# Patient Record
Sex: FEMALE | Race: WHITE | Employment: UNEMPLOYED | URBAN - METROPOLITAN AREA
[De-identification: names, ages, dates, MRNs, and addresses within clinical notes are randomized per-mention and may not be internally consistent; named-entity substitution may affect disease eponyms.]

---

## 2017-02-02 ENCOUNTER — OFFICE VISIT (OUTPATIENT)
Dept: INTERNAL MEDICINE CLINIC | Facility: CLINIC | Age: 52
End: 2017-02-02

## 2017-02-02 VITALS
BODY MASS INDEX: 27.32 KG/M2 | WEIGHT: 154.19 LBS | RESPIRATION RATE: 16 BRPM | HEART RATE: 84 BPM | DIASTOLIC BLOOD PRESSURE: 77 MMHG | SYSTOLIC BLOOD PRESSURE: 117 MMHG | HEIGHT: 63 IN

## 2017-02-02 DIAGNOSIS — R73.03 PREDIABETES: ICD-10-CM

## 2017-02-02 DIAGNOSIS — Z00.00 ROUTINE HEALTH MAINTENANCE: Primary | ICD-10-CM

## 2017-02-02 DIAGNOSIS — F41.9 ANXIETY: ICD-10-CM

## 2017-02-02 DIAGNOSIS — K21.9 GASTROESOPHAGEAL REFLUX DISEASE, ESOPHAGITIS PRESENCE NOT SPECIFIED: ICD-10-CM

## 2017-02-02 PROCEDURE — 99213 OFFICE O/P EST LOW 20 MIN: CPT | Performed by: INTERNAL MEDICINE

## 2017-02-02 PROCEDURE — 99396 PREV VISIT EST AGE 40-64: CPT | Performed by: INTERNAL MEDICINE

## 2017-02-02 RX ORDER — RANITIDINE 150 MG/1
150 CAPSULE ORAL 2 TIMES DAILY
Qty: 60 CAPSULE | Refills: 11 | Status: SHIPPED | OUTPATIENT
Start: 2017-02-02 | End: 2017-03-14

## 2017-02-02 RX ORDER — METFORMIN HYDROCHLORIDE 500 MG/1
TABLET, EXTENDED RELEASE ORAL
Qty: 90 TABLET | Refills: 1 | Status: SHIPPED | OUTPATIENT
Start: 2017-02-02 | End: 2017-08-10

## 2017-02-02 RX ORDER — PANTOPRAZOLE SODIUM 40 MG/1
40 TABLET, DELAYED RELEASE ORAL
Qty: 30 TABLET | Refills: 5 | Status: SHIPPED | OUTPATIENT
Start: 2017-02-02 | End: 2017-03-14

## 2017-02-02 RX ORDER — METFORMIN HYDROCHLORIDE 500 MG/1
TABLET, EXTENDED RELEASE ORAL
Qty: 270 TABLET | Refills: 1 | Status: SHIPPED | OUTPATIENT
Start: 2017-02-02 | End: 2017-03-14

## 2017-02-02 NOTE — PATIENT INSTRUCTIONS
Alternate pantoprazole 40 mg once a day with Ranitidine 150 mg twice a day. Non-fasting labs in April.

## 2017-02-02 NOTE — PROGRESS NOTES
HPI:    Patient ID: Marlen Acevedo is a 45year old female. HPI Comments: Pt is here for a physical and pap. Pantoprazole is cheaper than omeprazole.   Gastro-esophageal Reflux  She reports no abdominal pain, no chest pain, no coughing, no nausea, Known Allergies   PHYSICAL EXAM:   Physical Exam   Nursing note and vitals reviewed. Constitutional: She is oriented to person, place, and time. She appears well-developed and well-nourished. HENT:   Head: Normocephalic and atraumatic.    Right Ear: Tym management. - Hemoglobin A1C [E]; Future    3. Gastroesophageal reflux disease, esophagitis presence not specified  Will try weaning to ranitidine  - Pantoprazole Sodium 40 MG Oral Tab EC;  Take 1 tablet (40 mg total) by mouth every morning before breakfas

## 2017-02-04 LAB — HPV I/H RISK 1 DNA SPEC QL NAA+PROBE: NEGATIVE

## 2017-03-11 DIAGNOSIS — R73.03 PREDIABETES: ICD-10-CM

## 2017-03-11 DIAGNOSIS — K21.9 GASTROESOPHAGEAL REFLUX DISEASE, ESOPHAGITIS PRESENCE NOT SPECIFIED: ICD-10-CM

## 2017-03-11 DIAGNOSIS — F41.9 ANXIETY: ICD-10-CM

## 2017-03-14 ENCOUNTER — TELEPHONE (OUTPATIENT)
Dept: INTERNAL MEDICINE CLINIC | Facility: CLINIC | Age: 52
End: 2017-03-14

## 2017-03-14 DIAGNOSIS — K21.9 GASTROESOPHAGEAL REFLUX DISEASE, ESOPHAGITIS PRESENCE NOT SPECIFIED: Primary | ICD-10-CM

## 2017-03-14 RX ORDER — PANTOPRAZOLE SODIUM 40 MG/1
40 TABLET, DELAYED RELEASE ORAL
Qty: 90 TABLET | Refills: 1 | Status: SHIPPED | OUTPATIENT
Start: 2017-03-14 | End: 2017-03-25

## 2017-03-14 RX ORDER — RANITIDINE 150 MG/1
150 TABLET ORAL 2 TIMES DAILY
Qty: 180 TABLET | Refills: 1 | Status: SHIPPED | OUTPATIENT
Start: 2017-03-14 | End: 2017-03-17

## 2017-03-14 RX ORDER — METFORMIN HYDROCHLORIDE 500 MG/1
TABLET, EXTENDED RELEASE ORAL
Qty: 90 TABLET | Refills: 1 | Status: CANCELLED | OUTPATIENT
Start: 2017-03-14

## 2017-03-14 RX ORDER — METFORMIN HYDROCHLORIDE 500 MG/1
TABLET, EXTENDED RELEASE ORAL
Qty: 270 TABLET | Refills: 1 | Status: SHIPPED | OUTPATIENT
Start: 2017-03-14 | End: 2017-03-25

## 2017-03-14 RX ORDER — RANITIDINE 150 MG/1
150 CAPSULE ORAL 2 TIMES DAILY
Qty: 180 CAPSULE | Refills: 1 | Status: SHIPPED | OUTPATIENT
Start: 2017-03-14 | End: 2017-03-25

## 2017-03-14 NOTE — TELEPHONE ENCOUNTER
Diabetes Medications  Protocol Criteria:  · Appointment scheduled in the past 6 months or the next 3 months  · A1C < 7.5 in the past 6 months  · Creatinine in the past 12 months  · Creatinine result < 1.5   Recent Visits       Provider Department Primary D Future Appointments       Provider Department Appt Notes    In 4 months MD Christine Zimmer Visit   ordered follow up visit

## 2017-03-14 NOTE — TELEPHONE ENCOUNTER
[facility] pts [facility] time using this pharmacy. Current Outpatient Prescriptions:  Sertraline HCl 50 MG Oral Tab Take 1 tablet (50 mg total) by mouth daily. Take one half tab daily for 1 week, then one daily.  Disp:

## 2017-03-15 NOTE — TELEPHONE ENCOUNTER
Pharmacy left .  stts capsules are on back order, requesting to change to tablets.     RaNITidine HCl 150 MG Oral Cap

## 2017-03-15 NOTE — TELEPHONE ENCOUNTER
Sent to incorrect pharmacy.  Should be sent to [facility] instead of [place] [id number]

## 2017-03-17 RX ORDER — RANITIDINE 150 MG/1
150 TABLET ORAL 2 TIMES DAILY
Qty: 180 TABLET | Refills: 1 | Status: SHIPPED | OUTPATIENT
Start: 2017-03-17 | End: 2017-08-03

## 2017-03-21 DIAGNOSIS — K21.9 GASTROESOPHAGEAL REFLUX DISEASE, ESOPHAGITIS PRESENCE NOT SPECIFIED: ICD-10-CM

## 2017-03-21 DIAGNOSIS — R73.03 PREDIABETES: Primary | ICD-10-CM

## 2017-03-21 DIAGNOSIS — F41.9 ANXIETY: ICD-10-CM

## 2017-03-21 NOTE — TELEPHONE ENCOUNTER
Pt would like a refill on her metformin, pantoprazole, ranitidine, sertraline medication. Pt would like these to go to the [facility] [employer] Mail order at Phone: [phone].        Current Outpatient Prescriptions:  RaNITidine HCl 150 MG Oral Tab Take 1 tablet

## 2017-03-27 RX ORDER — PANTOPRAZOLE SODIUM 40 MG/1
40 TABLET, DELAYED RELEASE ORAL
Qty: 90 TABLET | Refills: 1 | Status: SHIPPED | OUTPATIENT
Start: 2017-03-27 | End: 2017-11-08

## 2017-03-27 RX ORDER — RANITIDINE 150 MG/1
150 CAPSULE ORAL 2 TIMES DAILY
Qty: 180 CAPSULE | Refills: 1 | Status: SHIPPED | OUTPATIENT
Start: 2017-03-27 | End: 2017-09-13

## 2017-03-27 RX ORDER — METFORMIN HYDROCHLORIDE 500 MG/1
TABLET, EXTENDED RELEASE ORAL
Qty: 270 TABLET | Refills: 1 | Status: SHIPPED | OUTPATIENT
Start: 2017-03-27 | End: 2017-08-03

## 2017-05-02 ENCOUNTER — APPOINTMENT (OUTPATIENT)
Dept: LAB | Facility: HOSPITAL | Age: 52
End: 2017-05-02
Attending: INTERNAL MEDICINE
Payer: COMMERCIAL

## 2017-05-02 DIAGNOSIS — R73.03 PREDIABETES: ICD-10-CM

## 2017-05-02 PROCEDURE — 36415 COLL VENOUS BLD VENIPUNCTURE: CPT

## 2017-05-02 PROCEDURE — 83036 HEMOGLOBIN GLYCOSYLATED A1C: CPT

## 2017-07-07 ENCOUNTER — TELEPHONE (OUTPATIENT)
Dept: GASTROENTEROLOGY | Facility: CLINIC | Age: 52
End: 2017-07-07

## 2017-08-03 ENCOUNTER — TELEPHONE (OUTPATIENT)
Dept: GASTROENTEROLOGY | Facility: CLINIC | Age: 52
End: 2017-08-03

## 2017-08-03 RX ORDER — DOXEPIN HYDROCHLORIDE 50 MG/1
1 CAPSULE ORAL DAILY
COMMUNITY

## 2017-08-08 ENCOUNTER — LAB ENCOUNTER (OUTPATIENT)
Dept: LAB | Facility: HOSPITAL | Age: 52
End: 2017-08-08
Attending: INTERNAL MEDICINE
Payer: COMMERCIAL

## 2017-08-08 DIAGNOSIS — R53.83 FATIGUE, UNSPECIFIED TYPE: ICD-10-CM

## 2017-08-08 LAB
BASOPHILS # BLD: 0 K/UL (ref 0–0.2)
BASOPHILS NFR BLD: 0 %
EOSINOPHIL # BLD: 0.2 K/UL (ref 0–0.7)
EOSINOPHIL NFR BLD: 3 %
ERYTHROCYTE [DISTWIDTH] IN BLOOD BY AUTOMATED COUNT: 13.1 % (ref 11–15)
HBA1C MFR BLD: 6.5 % (ref 4–6)
HCT VFR BLD AUTO: 36.8 % (ref 35–48)
HGB BLD-MCNC: 12 G/DL (ref 12–16)
LYMPHOCYTES # BLD: 3 K/UL (ref 1–4)
LYMPHOCYTES NFR BLD: 37 %
MCH RBC QN AUTO: 29.1 PG (ref 27–32)
MCHC RBC AUTO-ENTMCNC: 32.6 G/DL (ref 32–37)
MCV RBC AUTO: 89.3 FL (ref 80–100)
MONOCYTES # BLD: 0.6 K/UL (ref 0–1)
MONOCYTES NFR BLD: 7 %
NEUTROPHILS # BLD AUTO: 4.2 K/UL (ref 1.8–7.7)
NEUTROPHILS NFR BLD: 53 %
PLATELET # BLD AUTO: 231 K/UL (ref 140–400)
PMV BLD AUTO: 9.6 FL (ref 7.4–10.3)
RBC # BLD AUTO: 4.12 M/UL (ref 3.7–5.4)
TSH SERPL-ACNC: 5.19 UIU/ML (ref 0.45–5.33)
VIT B12 SERPL-MCNC: 437 PG/ML (ref 181–914)
WBC # BLD AUTO: 8 K/UL (ref 4–11)

## 2017-08-08 PROCEDURE — 84443 ASSAY THYROID STIM HORMONE: CPT

## 2017-08-08 PROCEDURE — 83036 HEMOGLOBIN GLYCOSYLATED A1C: CPT

## 2017-08-08 PROCEDURE — 36415 COLL VENOUS BLD VENIPUNCTURE: CPT

## 2017-08-08 PROCEDURE — 82607 VITAMIN B-12: CPT

## 2017-08-08 PROCEDURE — 85025 COMPLETE CBC W/AUTO DIFF WBC: CPT

## 2017-08-10 ENCOUNTER — OFFICE VISIT (OUTPATIENT)
Dept: INTERNAL MEDICINE CLINIC | Facility: CLINIC | Age: 52
End: 2017-08-10

## 2017-08-10 ENCOUNTER — TELEPHONE (OUTPATIENT)
Dept: INTERNAL MEDICINE CLINIC | Facility: CLINIC | Age: 52
End: 2017-08-10

## 2017-08-10 VITALS
DIASTOLIC BLOOD PRESSURE: 85 MMHG | WEIGHT: 179.19 LBS | RESPIRATION RATE: 18 BRPM | BODY MASS INDEX: 31.75 KG/M2 | HEIGHT: 63 IN | SYSTOLIC BLOOD PRESSURE: 135 MMHG | HEART RATE: 80 BPM

## 2017-08-10 DIAGNOSIS — R73.03 PREDIABETES: ICD-10-CM

## 2017-08-10 DIAGNOSIS — M54.2 NECK PAIN: ICD-10-CM

## 2017-08-10 DIAGNOSIS — E66.9 OBESITY (BMI 30-39.9): Primary | ICD-10-CM

## 2017-08-10 PROCEDURE — 99212 OFFICE O/P EST SF 10 MIN: CPT | Performed by: INTERNAL MEDICINE

## 2017-08-10 PROCEDURE — 99214 OFFICE O/P EST MOD 30 MIN: CPT | Performed by: INTERNAL MEDICINE

## 2017-08-10 RX ORDER — METFORMIN HYDROCHLORIDE 500 MG/1
TABLET, EXTENDED RELEASE ORAL
Qty: 90 TABLET | Refills: 1 | Status: SHIPPED | OUTPATIENT
Start: 2017-08-10 | End: 2017-08-10

## 2017-08-10 RX ORDER — METFORMIN HYDROCHLORIDE 500 MG/1
TABLET, EXTENDED RELEASE ORAL
Qty: 360 TABLET | Refills: 1 | Status: SHIPPED | OUTPATIENT
Start: 2017-08-10 | End: 2018-02-14

## 2017-08-10 NOTE — TELEPHONE ENCOUNTER
Pt requesting to have Metformin 500 mg four tablets daily sent to mail order pharmacy, noted prescription sent today with [employer]. Med pended for review.

## 2017-08-10 NOTE — PATIENT INSTRUCTIONS
Shoulder Shrug Exercise  To start, sit in a chair with your feet flat on the floor. Shift your weight slightly forward to avoid rounding your back. Relax.  Keep your ears, shoulders, and hips aligned:  · Raise both of your shoulders as high as you can, as

## 2017-08-10 NOTE — TELEPHONE ENCOUNTER
METFORMIN 500 MG ---- DISPENSED TODAY - TAKE 4 TIMES DAILY   NOT FULL SCRIPT ( SHE IS NOT GETTING THAT ONE AT [facility] )     PLEASE DENT TO MAIL ORDER FOR FULL 90 DAY SUPPLY  # [phone]      [facility] [address], [address] [facility]

## 2017-08-10 NOTE — PROGRESS NOTES
HPI:    Patient ID: Delon Collet is a 45year old female. She gained 28 pounds since she went off Qsymia. She has an increase in her appetite. She is eating healthy. She has pain in the back of her neck and lower head.   She has a lot of stress a Allergies:No Known Allergies   PHYSICAL EXAM:   Physical Exam   Nursing note and vitals reviewed. Constitutional: She is oriented to person, place, and time and obese. She appears well-developed. HENT:   Head: Normocephalic and atraumatic.    Eyes:

## 2017-08-11 ENCOUNTER — TELEPHONE (OUTPATIENT)
Dept: GASTROENTEROLOGY | Facility: CLINIC | Age: 52
End: 2017-08-11

## 2017-08-11 NOTE — TELEPHONE ENCOUNTER
Dr. Brandon High--    This patient called back after scheduling her colonoscopy and requested to add an EGD since she is having a burning sensation in the back of her throat and is not sure if this has to do with reflux she was diagnosis or not but this do

## 2017-08-14 NOTE — TELEPHONE ENCOUNTER
I called this patient back and L/M to inform this patient that the EGD has been added to her procedure. There is no need for her to return my call, it was just an FYI since we did discuss all prep and any question she had at the time of scheduling.     I no

## 2017-09-13 ENCOUNTER — PATIENT MESSAGE (OUTPATIENT)
Dept: INTERNAL MEDICINE CLINIC | Facility: CLINIC | Age: 52
End: 2017-09-13

## 2017-09-13 DIAGNOSIS — E66.9 OBESITY (BMI 30-39.9): ICD-10-CM

## 2017-09-13 DIAGNOSIS — K21.9 GASTROESOPHAGEAL REFLUX DISEASE, ESOPHAGITIS PRESENCE NOT SPECIFIED: ICD-10-CM

## 2017-09-13 DIAGNOSIS — F41.9 ANXIETY: ICD-10-CM

## 2017-09-13 NOTE — TELEPHONE ENCOUNTER
Has appt. .Chart reviewed. Refills sent per Triage Dept protocol. Refill Protocol Appointment Criteria  · Appointment scheduled in the past 6 months or in the next 3 months  Recent Outpatient Visits            1 month ago Obesity (BMI 30-39. 9)    Maryjane

## 2017-09-13 NOTE — TELEPHONE ENCOUNTER
From: Sanjuana Gutiérrez  Sent: 9/13/2017 11:06 AM CDT  Subject: Medication Renewal Request    Fanny L. Babetta Paget would like a refill of the following medications:     Sertraline HCl 50 MG Oral Tab Milton Greenfield MD]   Patient Comment: Pls phone in 90-day

## 2017-09-13 NOTE — TELEPHONE ENCOUNTER
From: Sonal Fisher  To: Fredy Cruz MD  Sent: 9/13/2017 11:15 AM CDT  Subject: Prescription Question    Hello - fyi, I just submitted two Rx refill requests . .. one to refill my Sertraline which I ran out of and would like to request a refill.  The

## 2017-09-15 RX ORDER — RANITIDINE 150 MG/1
CAPSULE ORAL
Qty: 180 CAPSULE | Refills: 0 | Status: SHIPPED | OUTPATIENT
Start: 2017-09-15 | End: 2017-11-08

## 2017-09-15 NOTE — TELEPHONE ENCOUNTER
Duplicate therapy warning, please advise that you want pt on rantidine and pantoprazole  Refill Protocol Appointment Criteria  · Appointment scheduled in the past 12 months or in the next 3 months  Recent Outpatient Visits            1 month ago Obesity (B

## 2017-09-19 ENCOUNTER — TELEPHONE (OUTPATIENT)
Dept: OTHER | Age: 52
End: 2017-09-19

## 2017-09-19 NOTE — TELEPHONE ENCOUNTER
Heidi   Patient Medication Renewal Request Pool 6 days ago         Annabelle.  Tadeo Petey would like a refill of the following medications:         Phentermine-Topiramate (QSYMIA) 7.5-46 MG Oral Capsule SR 24 Hr Katia Eddy MD]       Patient Comment: R

## 2017-09-20 ENCOUNTER — PATIENT MESSAGE (OUTPATIENT)
Dept: OTHER | Age: 52
End: 2017-09-20

## 2017-09-20 NOTE — TELEPHONE ENCOUNTER
From: Glenys Villegas LPN  To: Ita Persaud  Sent: 9/20/2017 9:33 AM CDT  Subject: Medication approval    Marlys Preciado 7.5-46 mg capsule has been approved by your insurance company effective 8/21/2017-12/19/2017.  You may now have the

## 2017-09-20 NOTE — TELEPHONE ENCOUNTER
PA for Qsymia 7.5-46 mg cap completed with [facility] via [address] [facility] [address]. Medication approved effective 8/21/2017-12/19/2017. Patient notified via [address]

## 2017-10-19 DIAGNOSIS — E66.9 OBESITY (BMI 30-39.9): ICD-10-CM

## 2017-10-20 NOTE — TELEPHONE ENCOUNTER
Please advise on refill request.     Recent Outpatient Visits            2 months ago Obesity (BMI 30-39. 9)    Ayesha Mcmahon MD    Office Visit    8 months ago Routine health maintenance    [address], [address],

## 2017-10-20 NOTE — TELEPHONE ENCOUNTER
Jose Suero was called into the [facility] pharmacy in [place] as ordered on 10/20/17

## 2017-10-20 NOTE — TELEPHONE ENCOUNTER
From: Fazal Ricketts  Sent: 10/19/2017 7:53 PM CDT  Subject: Medication Renewal Request    Paula LHiram Tanrolan would like a refill of the following medications:     Phentermine-Topiramate (QSYMIA) 7.5-46 MG Oral Capsule SR 24 Hr Yessi Amezquita MD]   Dnih Muñoz

## 2017-10-31 ENCOUNTER — HOSPITAL ENCOUNTER (OUTPATIENT)
Facility: HOSPITAL | Age: 52
Setting detail: HOSPITAL OUTPATIENT SURGERY
Discharge: HOME | End: 2017-10-31
Attending: INTERNAL MEDICINE | Admitting: INTERNAL MEDICINE
Payer: COMMERCIAL

## 2017-10-31 ENCOUNTER — SURGERY (OUTPATIENT)
Age: 52
End: 2017-10-31

## 2017-10-31 DIAGNOSIS — Z86.010 HX OF COLONIC POLYPS: ICD-10-CM

## 2017-10-31 DIAGNOSIS — Z12.11 SPECIAL SCREENING FOR MALIGNANT NEOPLASM OF COLON: ICD-10-CM

## 2017-10-31 DIAGNOSIS — K21.9 ACID REFLUX DISEASE: ICD-10-CM

## 2017-10-31 PROCEDURE — 45385 COLONOSCOPY W/LESION REMOVAL: CPT | Performed by: INTERNAL MEDICINE

## 2017-10-31 PROCEDURE — 0DBL8ZX EXCISION OF TRANSVERSE COLON, VIA NATURAL OR ARTIFICIAL OPENING ENDOSCOPIC, DIAGNOSTIC: ICD-10-PCS | Performed by: INTERNAL MEDICINE

## 2017-10-31 PROCEDURE — 0DB68ZX EXCISION OF STOMACH, VIA NATURAL OR ARTIFICIAL OPENING ENDOSCOPIC, DIAGNOSTIC: ICD-10-PCS | Performed by: INTERNAL MEDICINE

## 2017-10-31 PROCEDURE — 0DB48ZX EXCISION OF ESOPHAGOGASTRIC JUNCTION, VIA NATURAL OR ARTIFICIAL OPENING ENDOSCOPIC, DIAGNOSTIC: ICD-10-PCS | Performed by: INTERNAL MEDICINE

## 2017-10-31 PROCEDURE — 99153 MOD SED SAME PHYS/QHP EA: CPT | Performed by: INTERNAL MEDICINE

## 2017-10-31 PROCEDURE — 43239 EGD BIOPSY SINGLE/MULTIPLE: CPT | Performed by: INTERNAL MEDICINE

## 2017-10-31 PROCEDURE — 99152 MOD SED SAME PHYS/QHP 5/>YRS: CPT | Performed by: INTERNAL MEDICINE

## 2017-10-31 RX ORDER — SODIUM CHLORIDE 0.9 % (FLUSH) 0.9 %
10 SYRINGE (ML) INJECTION AS NEEDED
Status: DISCONTINUED | OUTPATIENT
Start: 2017-10-31 | End: 2017-10-31

## 2017-10-31 RX ORDER — MIDAZOLAM HYDROCHLORIDE 1 MG/ML
INJECTION INTRAMUSCULAR; INTRAVENOUS
Status: DISCONTINUED | OUTPATIENT
Start: 2017-10-31 | End: 2017-10-31

## 2017-10-31 RX ORDER — SODIUM CHLORIDE, SODIUM LACTATE, POTASSIUM CHLORIDE, CALCIUM CHLORIDE 600; 310; 30; 20 MG/100ML; MG/100ML; MG/100ML; MG/100ML
INJECTION, SOLUTION INTRAVENOUS CONTINUOUS
Status: DISCONTINUED | OUTPATIENT
Start: 2017-10-31 | End: 2017-10-31

## 2017-10-31 RX ORDER — MIDAZOLAM HYDROCHLORIDE 1 MG/ML
1 INJECTION INTRAMUSCULAR; INTRAVENOUS EVERY 5 MIN PRN
Status: DISCONTINUED | OUTPATIENT
Start: 2017-10-31 | End: 2017-10-31

## 2017-10-31 NOTE — OPERATIVE REPORT
[facility] Endoscopy Report      Date of Procedure:  10/31/17      Preoperative Diagnosis:  1. Personal history of adenomatous colon polyps  2. Colorectal cancer screening  3. Refractory pyrosis      Postoperative Diagnosis:  1.   Roman Garcia The endoscope was withdrawn to the stomach where retroflexion of the angulus, body, cardia and fundus was performed. The instrument was straightened, insufflated air and fluid were suctioned and the endoscope was withdrawn.   The procedures were well agusto Hiatal hernia  6. Rule out short segment Carrasco's esophagus      Recommendations:  1. Follow-up biopsy results. 2.  Probable follow-up colonoscopy in 5 years. 3.  High-fiber diet. 4.  Further recommendations pending biopsy and clinical course.

## 2017-10-31 NOTE — H&P
History & Physical Examination    Patient Name: Lottie Roman  MRN: [id number]  [facility]: [id number]  YOB: 1965    Diagnosis: History of adenomatous colon polyps, colorectal cancer screening and refractory pyrosis        Prescriptions Prior to Comment: (2drinks) weekly       SYSTEM Check if Review is Normal Check if Physical Exam is Normal If not normal, please explain:   HEENT Alcaeus.Flynn ] [ Nevin Edwards  Alcaeus.Pruett ] [ X]    HEART Alcaeus.Pruett ] [ Derinda Catching Alcaeus.Flynn ] [ Kathi Fire Alcaeus.Flynn ] [ Nona Sin Alcaeus.Flynn ] [ Oneal Carbo

## 2017-11-01 VITALS
WEIGHT: 170 LBS | RESPIRATION RATE: 13 BRPM | OXYGEN SATURATION: 96 % | SYSTOLIC BLOOD PRESSURE: 114 MMHG | HEART RATE: 67 BPM | DIASTOLIC BLOOD PRESSURE: 70 MMHG | HEIGHT: 63 IN | BODY MASS INDEX: 30.12 KG/M2

## 2017-11-03 ENCOUNTER — TELEPHONE (OUTPATIENT)
Dept: OTHER | Age: 52
End: 2017-11-03

## 2017-11-03 NOTE — TELEPHONE ENCOUNTER
Pt calling (Name and  verified) and states that she has blood work due to be drawn and is asking if she needs to be fasting? Pt informed that the only pending lab order is for TSH and that does not require fasting.     Pt verbalized understanding and

## 2017-11-04 ENCOUNTER — APPOINTMENT (OUTPATIENT)
Dept: LAB | Facility: HOSPITAL | Age: 52
End: 2017-11-04
Attending: INTERNAL MEDICINE
Payer: COMMERCIAL

## 2017-11-04 DIAGNOSIS — R63.5 WEIGHT GAIN: ICD-10-CM

## 2017-11-04 PROCEDURE — 84443 ASSAY THYROID STIM HORMONE: CPT

## 2017-11-04 PROCEDURE — 36415 COLL VENOUS BLD VENIPUNCTURE: CPT

## 2017-11-06 ENCOUNTER — OFFICE VISIT (OUTPATIENT)
Dept: INTERNAL MEDICINE CLINIC | Facility: CLINIC | Age: 52
End: 2017-11-06

## 2017-11-06 VITALS
SYSTOLIC BLOOD PRESSURE: 103 MMHG | HEIGHT: 63 IN | BODY MASS INDEX: 31.01 KG/M2 | HEART RATE: 84 BPM | WEIGHT: 175 LBS | RESPIRATION RATE: 16 BRPM | DIASTOLIC BLOOD PRESSURE: 67 MMHG

## 2017-11-06 DIAGNOSIS — Z00.00 ROUTINE HEALTH MAINTENANCE: ICD-10-CM

## 2017-11-06 DIAGNOSIS — Z12.31 VISIT FOR SCREENING MAMMOGRAM: ICD-10-CM

## 2017-11-06 DIAGNOSIS — E66.9 OBESITY (BMI 30-39.9): ICD-10-CM

## 2017-11-06 DIAGNOSIS — M54.2 NECK PAIN: ICD-10-CM

## 2017-11-06 DIAGNOSIS — N76.0 ACUTE VAGINITIS: ICD-10-CM

## 2017-11-06 DIAGNOSIS — R51.9 OCCIPITAL PAIN: Primary | ICD-10-CM

## 2017-11-06 DIAGNOSIS — R92.2 DENSE BREAST: ICD-10-CM

## 2017-11-06 DIAGNOSIS — R39.9 URINARY SYMPTOM OR SIGN: ICD-10-CM

## 2017-11-06 DIAGNOSIS — R73.03 PREDIABETES: ICD-10-CM

## 2017-11-06 PROBLEM — R92.30 DENSE BREAST: Status: ACTIVE | Noted: 2017-11-06

## 2017-11-06 PROCEDURE — 81002 URINALYSIS NONAUTO W/O SCOPE: CPT | Performed by: INTERNAL MEDICINE

## 2017-11-06 PROCEDURE — 99214 OFFICE O/P EST MOD 30 MIN: CPT | Performed by: INTERNAL MEDICINE

## 2017-11-06 RX ORDER — FLUCONAZOLE 150 MG/1
150 TABLET ORAL ONCE
Qty: 1 TABLET | Refills: 0 | Status: SHIPPED | OUTPATIENT
Start: 2017-11-06 | End: 2017-11-06

## 2017-11-06 NOTE — ASSESSMENT & PLAN NOTE
Pt feels well on on Qsymia without side effects. She has noticed a decreased appetite and has lost weight with diet and exercise. She understands possible CV risks. Pt requests to continue Qsymia.

## 2017-11-06 NOTE — PATIENT INSTRUCTIONS
Do fasting labs soon. Fast for 12 hours. Water is okay. Walk in. Use over-the-counter 3 day Monistat, gyne-Lotrimin, or Femstat. Generic is fine.

## 2017-11-06 NOTE — ASSESSMENT & PLAN NOTE
Urine dip is positive only for leuks and pt describes more of a vaginitis type of irritation. Will Rx for vaginitis.

## 2017-11-06 NOTE — PROGRESS NOTES
When she urinates it burns a little. She also urinates frequently. This has been going on for 2 weeks. Bly is very painful. She gets pain in the back of her head and it is getting worse. It is constant.   She is afraid to take anything because and in no distress. No distress. HENT:   Head: Normocephalic and atraumatic. Right Ear: External ear normal.   Left Ear: External ear normal.   Eyes: Conjunctivae are normal. Pupils are equal, round, and reactive to light. No scleral icterus.    Neck: N LOLIS (CPT=77067/09754)    Routine health maintenance        Relevant Orders    HEMOGLOBIN A1C    LIPID PANEL    COMP METABOLIC PANEL (14)

## 2017-11-08 DIAGNOSIS — K21.9 GASTROESOPHAGEAL REFLUX DISEASE, ESOPHAGITIS PRESENCE NOT SPECIFIED: ICD-10-CM

## 2017-11-08 RX ORDER — RANITIDINE 150 MG/1
150 CAPSULE ORAL NIGHTLY
Qty: 90 CAPSULE | Refills: 3 | Status: SHIPPED | OUTPATIENT
Start: 2017-11-08 | End: 2018-10-16

## 2017-11-08 RX ORDER — PANTOPRAZOLE SODIUM 40 MG/1
40 TABLET, DELAYED RELEASE ORAL
Qty: 180 TABLET | Refills: 3 | Status: SHIPPED | OUTPATIENT
Start: 2017-11-08 | End: 2018-04-16

## 2017-11-09 ENCOUNTER — TELEPHONE (OUTPATIENT)
Dept: GASTROENTEROLOGY | Facility: CLINIC | Age: 52
End: 2017-11-09

## 2017-11-09 NOTE — TELEPHONE ENCOUNTER
----- Message from Sam Herman MD sent at 11/8/2017  8:42 PM CST -----  I spoke to Neo. She had a solitary small tubular adenoma removed. She has had adenomatous polyps in the past.  I have discussed the significance.   Uncomplicated diverticulo

## 2017-12-03 ENCOUNTER — APPOINTMENT (OUTPATIENT)
Dept: LAB | Facility: HOSPITAL | Age: 52
End: 2017-12-03
Attending: INTERNAL MEDICINE
Payer: COMMERCIAL

## 2017-12-03 DIAGNOSIS — Z00.00 ROUTINE HEALTH MAINTENANCE: ICD-10-CM

## 2017-12-03 PROCEDURE — 80053 COMPREHEN METABOLIC PANEL: CPT

## 2017-12-03 PROCEDURE — 83036 HEMOGLOBIN GLYCOSYLATED A1C: CPT

## 2017-12-03 PROCEDURE — 80061 LIPID PANEL: CPT

## 2017-12-03 PROCEDURE — 36415 COLL VENOUS BLD VENIPUNCTURE: CPT

## 2017-12-30 ENCOUNTER — HOSPITAL ENCOUNTER (OUTPATIENT)
Dept: MAMMOGRAPHY | Facility: HOSPITAL | Age: 52
Discharge: HOME | End: 2017-12-30
Attending: INTERNAL MEDICINE
Payer: COMMERCIAL

## 2017-12-30 DIAGNOSIS — Z12.31 VISIT FOR SCREENING MAMMOGRAM: ICD-10-CM

## 2017-12-30 DIAGNOSIS — R92.2 DENSE BREAST: ICD-10-CM

## 2017-12-30 PROCEDURE — 77063 BREAST TOMOSYNTHESIS BI: CPT | Performed by: INTERNAL MEDICINE

## 2017-12-30 PROCEDURE — 77067 SCR MAMMO BI INCL CAD: CPT | Performed by: INTERNAL MEDICINE

## 2018-01-03 ENCOUNTER — TELEPHONE (OUTPATIENT)
Dept: INTERNAL MEDICINE CLINIC | Facility: CLINIC | Age: 53
End: 2018-01-03

## 2018-01-03 DIAGNOSIS — E66.9 OBESITY (BMI 30-39.9): ICD-10-CM

## 2018-01-06 NOTE — TELEPHONE ENCOUNTER
Please advise   Last refilled on 11/6/17  #30  1 refill.     Refill Protocol Appointment Criteria  · Appointment scheduled in the past 6 months or in the next 3 months  Recent Outpatient Visits            2 months ago Occipital pain    [facility]

## 2018-01-06 NOTE — TELEPHONE ENCOUNTER
From: Jaspreet Saunders  Sent: 1/3/2018 3:28 PM CST  Subject: Medication Renewal Request    Paula Juarez would like a refill of the following medications:     Phentermine-Topiramate (QSYMIA) 7.5-46 MG Oral Capsule SR 24 Hr Peri Almaguer MD]   Karin

## 2018-01-08 NOTE — TELEPHONE ENCOUNTER
Pharmacy calling stating     Phentermine-Topiramate (QSYMIA) 7.5-46 MG Oral Capsule SR 24 Hr Take 1 capsule by mouth once daily.  Disp: 30 capsule Rfl: 1      Needs prior authorization

## 2018-01-08 NOTE — TELEPHONE ENCOUNTER
Pharmacy calling stating      Phentermine-Topiramate (QSYMIA) 7.5-46 MG Oral Capsule SR 24 Hr Take 1 capsule by mouth once daily.  Disp: 30 capsule Rfl: 1       Needs prior authorization      Please call [phone] to request authorization

## 2018-01-08 NOTE — TELEPHONE ENCOUNTER
PA for Qsymia 7.5-46 mg cap  completed with [facility] via CMM response time 24-72 hours KEY [id number].

## 2018-01-12 ENCOUNTER — PATIENT MESSAGE (OUTPATIENT)
Dept: INTERNAL MEDICINE CLINIC | Facility: CLINIC | Age: 53
End: 2018-01-12

## 2018-01-13 NOTE — TELEPHONE ENCOUNTER
Re router to Dr Sharmila Jones,     Do you want PA for this? From: Torrie Other  To: Hakan Alan MD  Sent: 1/12/2018  4:06 PM CST  Subject: Other    Dear Dr. Sharmila Jones, thank you for calling [facility] to authorize my Qsymia refill.   According to [employer], they ar

## 2018-01-15 ENCOUNTER — OFFICE VISIT (OUTPATIENT)
Dept: INTERNAL MEDICINE CLINIC | Facility: CLINIC | Age: 53
End: 2018-01-15

## 2018-01-15 VITALS
HEIGHT: 63 IN | DIASTOLIC BLOOD PRESSURE: 71 MMHG | TEMPERATURE: 99 F | BODY MASS INDEX: 30.83 KG/M2 | WEIGHT: 174 LBS | SYSTOLIC BLOOD PRESSURE: 121 MMHG | HEART RATE: 89 BPM | RESPIRATION RATE: 16 BRPM

## 2018-01-15 DIAGNOSIS — R73.03 PREDIABETES: ICD-10-CM

## 2018-01-15 DIAGNOSIS — E66.9 OBESITY (BMI 30-39.9): ICD-10-CM

## 2018-01-15 DIAGNOSIS — M54.2 NECK PAIN: Primary | ICD-10-CM

## 2018-01-15 DIAGNOSIS — M25.512 ACUTE PAIN OF LEFT SHOULDER: ICD-10-CM

## 2018-01-15 PROCEDURE — 99214 OFFICE O/P EST MOD 30 MIN: CPT | Performed by: INTERNAL MEDICINE

## 2018-01-15 PROCEDURE — 99212 OFFICE O/P EST SF 10 MIN: CPT | Performed by: INTERNAL MEDICINE

## 2018-01-15 NOTE — ASSESSMENT & PLAN NOTE
This is somewhat better with reduced stress at work, however she still has the pain.   Will refer for PT.

## 2018-01-15 NOTE — PROGRESS NOTES
HPI:    Patient ID: Kiley Suarez is a 45year old female. She hasn't been on the Qsymia because her insurance didn't authorize it, even though it had said it will. She just got a letter saying they will authorize it.   She has not been exercising m capsule Rfl: 1   Pantoprazole Sodium 40 MG Oral Tab EC Take 1 tablet (40 mg total) by mouth 2 (two) times daily before meals. Disp: 180 tablet Rfl: 3   RaNITidine HCl 150 MG Oral Cap Take 1 capsule (150 mg total) by mouth nightly.  Disp: 90 capsule Rfl: 3 Primary     This is somewhat better with reduced stress at work, however she still has the pain. Will refer for PT. Relevant Orders    PHYSICAL THERAPY - INTERNAL    Obesity (BMI 30-39. 9)     She wants to resume the Qsymia.      Pt felt well on on

## 2018-01-15 NOTE — TELEPHONE ENCOUNTER
No prior auth needed. Pt received notification from her insurance that the PA was extended for another year.

## 2018-01-15 NOTE — ASSESSMENT & PLAN NOTE
She wants to resume the Qsymia. Pt felt well on on Qsymia without side effects. She has noticed a decreased appetite and had lost weight with diet and exercise. She understands possible CV risks. Qsymia refilled.

## 2018-01-16 ENCOUNTER — PATIENT MESSAGE (OUTPATIENT)
Dept: OTHER | Age: 53
End: 2018-01-16

## 2018-02-14 DIAGNOSIS — R73.03 PREDIABETES: ICD-10-CM

## 2018-02-14 RX ORDER — METFORMIN HYDROCHLORIDE 500 MG/1
TABLET, EXTENDED RELEASE ORAL
Qty: 360 TABLET | Refills: 1 | Status: SHIPPED | OUTPATIENT
Start: 2018-02-14 | End: 2018-07-26

## 2018-02-14 NOTE — TELEPHONE ENCOUNTER
Refilled per protocol.     Diabetes Medications  Protocol Criteria:  · Appointment scheduled in the past 6 months or the next 3 months  · A1C < 7.5 in the past 6 months  · Creatinine in the past 12 months  · Creatinine result < 1.5   Recent Outpatient Visit

## 2018-02-17 ENCOUNTER — OFFICE VISIT (OUTPATIENT)
Dept: INTERNAL MEDICINE CLINIC | Facility: CLINIC | Age: 53
End: 2018-02-17

## 2018-02-17 VITALS
HEIGHT: 63 IN | BODY MASS INDEX: 30.48 KG/M2 | SYSTOLIC BLOOD PRESSURE: 117 MMHG | HEART RATE: 76 BPM | WEIGHT: 172 LBS | TEMPERATURE: 98 F | DIASTOLIC BLOOD PRESSURE: 83 MMHG

## 2018-02-17 DIAGNOSIS — F41.9 ANXIETY: ICD-10-CM

## 2018-02-17 DIAGNOSIS — M54.2 NECK PAIN: Primary | ICD-10-CM

## 2018-02-17 DIAGNOSIS — E66.9 OBESITY (BMI 30-39.9): ICD-10-CM

## 2018-02-17 PROCEDURE — 99212 OFFICE O/P EST SF 10 MIN: CPT | Performed by: INTERNAL MEDICINE

## 2018-02-17 PROCEDURE — 99214 OFFICE O/P EST MOD 30 MIN: CPT | Performed by: INTERNAL MEDICINE

## 2018-02-17 NOTE — ASSESSMENT & PLAN NOTE
Pt has another month of Qsymia which she will finish, then she will start Contrave. S/E of Contrave discussed with pt. F/u 2 months.

## 2018-02-17 NOTE — PROGRESS NOTES
HPI:    Patient ID: Marika Perez is a 52year old female. The pain at the back of her head went away. She didn't go to therapy yet.    She fell in November up the elevator and hit her face against the wall of the elevator going to the train at the Phentermine-Topiramate (QSYMIA) 7.5-46 MG Oral Capsule SR 24 Hr Take 1 capsule by mouth once daily. Disp: 30 capsule Rfl: 1   Pantoprazole Sodium 40 MG Oral Tab EC Take 1 tablet (40 mg total) by mouth 2 (two) times daily before meals.  (Patient taking dif 30-39. 9)     Pt has another month of Qsymia which she will finish, then she will start Contrave. S/E of Contrave discussed with pt. F/u 2 months.          Relevant Medications    Naltrexone-Bupropion HCl ER (CONTRAVE) 8-90 MG Oral Tablet 12 Hr    Anxiety

## 2018-02-22 DIAGNOSIS — F41.9 ANXIETY: ICD-10-CM

## 2018-02-23 ENCOUNTER — TELEPHONE (OUTPATIENT)
Dept: INTERNAL MEDICINE CLINIC | Facility: CLINIC | Age: 53
End: 2018-02-23

## 2018-02-23 NOTE — TELEPHONE ENCOUNTER
Pharmacy calling because on 02/17/2018     Naltrexone-Bupropion HCl ER (CONTRAVE) 8-90 MG Oral Tablet 12 Hr Week 1: 1 tablet in AM      None in PMWeek 2: 1 tablet in AM      1 tablet in PMWeek 3: 2 tablets in AM    1 tablet in PMWeek 4: [place] 2 tablets in

## 2018-02-28 NOTE — TELEPHONE ENCOUNTER
PA for Contrave 8-90 mg ER tab completed with [facility] via [id number]. Medication approved effective 1/28/2018-6/27/2018; patient notified via [address]

## 2018-03-29 ENCOUNTER — MED REC SCAN ONLY (OUTPATIENT)
Dept: INTERNAL MEDICINE CLINIC | Facility: CLINIC | Age: 53
End: 2018-03-29

## 2018-03-30 DIAGNOSIS — E66.9 OBESITY (BMI 30-39.9): ICD-10-CM

## 2018-04-16 ENCOUNTER — OFFICE VISIT (OUTPATIENT)
Dept: INTERNAL MEDICINE CLINIC | Facility: CLINIC | Age: 53
End: 2018-04-16

## 2018-04-16 VITALS
HEIGHT: 63 IN | DIASTOLIC BLOOD PRESSURE: 87 MMHG | SYSTOLIC BLOOD PRESSURE: 137 MMHG | HEART RATE: 76 BPM | WEIGHT: 176 LBS | BODY MASS INDEX: 31.18 KG/M2

## 2018-04-16 DIAGNOSIS — E66.9 OBESITY (BMI 30-39.9): ICD-10-CM

## 2018-04-16 DIAGNOSIS — K21.9 GASTROESOPHAGEAL REFLUX DISEASE, ESOPHAGITIS PRESENCE NOT SPECIFIED: ICD-10-CM

## 2018-04-16 DIAGNOSIS — M54.2 NECK PAIN: Primary | ICD-10-CM

## 2018-04-16 DIAGNOSIS — R73.03 PREDIABETES: ICD-10-CM

## 2018-04-16 PROBLEM — N76.0 ACUTE VAGINITIS: Status: RESOLVED | Noted: 2017-11-06 | Resolved: 2018-04-16

## 2018-04-16 PROBLEM — R39.9 URINARY SYMPTOM OR SIGN: Status: RESOLVED | Noted: 2017-11-06 | Resolved: 2018-04-16

## 2018-04-16 PROCEDURE — 99212 OFFICE O/P EST SF 10 MIN: CPT | Performed by: INTERNAL MEDICINE

## 2018-04-16 PROCEDURE — 99214 OFFICE O/P EST MOD 30 MIN: CPT | Performed by: INTERNAL MEDICINE

## 2018-04-16 RX ORDER — DEXAMETHASONE 1 MG
TABLET ORAL
Qty: 1 TABLET | Refills: 0 | Status: SHIPPED | OUTPATIENT
Start: 2018-04-16 | End: 2018-05-31 | Stop reason: ALTCHOICE

## 2018-04-16 RX ORDER — PANTOPRAZOLE SODIUM 40 MG/1
40 TABLET, DELAYED RELEASE ORAL EVERY MORNING
Qty: 90 TABLET | Refills: 1 | Status: SHIPPED | OUTPATIENT
Start: 2018-04-16 | End: 2018-10-16

## 2018-04-17 NOTE — ASSESSMENT & PLAN NOTE
Patient has noticed the development of the supraclavicular fat pad. She also has multiple other symptoms that are consistent with Cushing syndrome but could be also due to obesity.   I will check a dexamethasone suppression test.  Discussed this with rosmery

## 2018-04-17 NOTE — PROGRESS NOTES
HPI:    Patient ID: Kiley Suarez is a 52year old female. The Contrave is not helping. She gained weight. She feels drowsy on the Contrave. She is going for PT for her neck and it is helping a little, but she still has pain.       Obesity   This Rfl: 0   dexamethasone 1 MG Oral Tab 1 tab at 11 pm.  Do cortisol blood test at 8 am. Disp: 1 tablet Rfl: 0   Sertraline HCl 50 MG Oral Tab Take 1 tablet (50 mg total) by mouth daily.  Disp: 90 tablet Rfl: 1   METFORMIN HCL  MG Oral Tablet 24 Hr TAKE EC    Obesity (BMI 30-39. 9)     Patient has noticed the development of the supraclavicular fat pad. She also has multiple other symptoms that are consistent with Cushing syndrome but could be also due to obesity.   I will check a dexamethasone suppression

## 2018-04-21 ENCOUNTER — APPOINTMENT (OUTPATIENT)
Dept: LAB | Age: 53
End: 2018-04-21
Attending: INTERNAL MEDICINE
Payer: COMMERCIAL

## 2018-04-21 DIAGNOSIS — E66.9 OBESITY (BMI 30-39.9): ICD-10-CM

## 2018-04-21 PROCEDURE — 82533 TOTAL CORTISOL: CPT

## 2018-04-21 PROCEDURE — 36415 COLL VENOUS BLD VENIPUNCTURE: CPT

## 2018-05-06 DIAGNOSIS — E66.9 OBESITY (BMI 30-39.9): ICD-10-CM

## 2018-05-06 RX ORDER — NALTREXONE HYDROCHLORIDE AND BUPROPION HYDROCHLORIDE 8; 90 MG/1; MG/1
2 TABLET, EXTENDED RELEASE ORAL 2 TIMES DAILY
Qty: 120 TABLET | Refills: 0 | OUTPATIENT
Start: 2018-05-06

## 2018-05-24 ENCOUNTER — TELEPHONE (OUTPATIENT)
Dept: INTERNAL MEDICINE CLINIC | Facility: CLINIC | Age: 53
End: 2018-05-24

## 2018-05-24 NOTE — TELEPHONE ENCOUNTER
Called pt and she is going out of town starting tomorrow and be back next wk Tuesday and would like to know if she can see Dr Kathy Arguelles either Wednesday or Thursday. Pls advise, Thank you.

## 2018-05-24 NOTE — TELEPHONE ENCOUNTER
Eula Winn From [place] [id number] wants to inform  pt isn't getting better.  Was getting better but has reach the pt she's not anymore    Pt is also going to contact the Dr to come back in

## 2018-05-31 ENCOUNTER — OFFICE VISIT (OUTPATIENT)
Dept: INTERNAL MEDICINE CLINIC | Facility: CLINIC | Age: 53
End: 2018-05-31

## 2018-05-31 VITALS
HEIGHT: 63 IN | BODY MASS INDEX: 29.21 KG/M2 | SYSTOLIC BLOOD PRESSURE: 112 MMHG | WEIGHT: 164.88 LBS | DIASTOLIC BLOOD PRESSURE: 75 MMHG | HEART RATE: 74 BPM

## 2018-05-31 DIAGNOSIS — M54.2 NECK PAIN: Primary | ICD-10-CM

## 2018-05-31 PROCEDURE — 99213 OFFICE O/P EST LOW 20 MIN: CPT | Performed by: INTERNAL MEDICINE

## 2018-05-31 PROCEDURE — 99212 OFFICE O/P EST SF 10 MIN: CPT | Performed by: INTERNAL MEDICINE

## 2018-05-31 NOTE — PROGRESS NOTES
HPI:    Patient ID: Gemma Moran is a 52year old female. Pt lost 12 pounds with Qsymia. The physical therapy helped some but then it stopped helping. She seems to have plateaued. She cannot turn her head to look back.   She has a tingling in her daily. Disp:  Rfl:    RaNITidine HCl 150 MG Oral Cap Take 1 capsule (150 mg total) by mouth nightly.  Disp: 90 capsule Rfl: 3       Allergies:No Known Allergies     Smoking status: Never Smoker                                                              Sm

## 2018-06-01 NOTE — ASSESSMENT & PLAN NOTE
Pt has had some improvement with PT, but she has persistent pain and tingling of her neck and shoulder. Will refer to ortho for further eval and treatment.

## 2018-06-26 ENCOUNTER — APPOINTMENT (OUTPATIENT)
Dept: LAB | Facility: HOSPITAL | Age: 53
End: 2018-06-26
Attending: INTERNAL MEDICINE
Payer: COMMERCIAL

## 2018-06-26 DIAGNOSIS — R73.03 PREDIABETES: ICD-10-CM

## 2018-06-26 PROCEDURE — 83036 HEMOGLOBIN GLYCOSYLATED A1C: CPT

## 2018-06-26 PROCEDURE — 36415 COLL VENOUS BLD VENIPUNCTURE: CPT

## 2018-07-01 ENCOUNTER — TELEPHONE (OUTPATIENT)
Dept: INTERNAL MEDICINE CLINIC | Facility: CLINIC | Age: 53
End: 2018-07-01

## 2018-07-01 DIAGNOSIS — E11.9 CONTROLLED TYPE 2 DIABETES MELLITUS WITHOUT COMPLICATION, WITHOUT LONG-TERM CURRENT USE OF INSULIN (HCC): Primary | ICD-10-CM

## 2018-07-01 NOTE — TELEPHONE ENCOUNTER
Spoke to pt:  Her A1C went up to 6.7. Pt has new onset diabetes. I will refer to the dietician and diabetic instructor. She saw Dr. Dee Buitrago about her neck and will have an MRI of her neck.

## 2018-07-26 DIAGNOSIS — R73.03 PREDIABETES: ICD-10-CM

## 2018-07-26 RX ORDER — METFORMIN HYDROCHLORIDE 500 MG/1
TABLET, EXTENDED RELEASE ORAL
Qty: 360 TABLET | Refills: 0 | Status: SHIPPED | OUTPATIENT
Start: 2018-07-26 | End: 2018-10-16

## 2018-07-26 NOTE — TELEPHONE ENCOUNTER
Diabetes Medication Protocol Passed7/26 3:33 AM   Creatinine in the past 12 months    Last A1C < 7.5 and within past 6 months    Last serum creatinine result < 1.5    Appointment in past 6 or next 3 months     Medication refilled for 90 days per protocol.

## 2018-07-29 ENCOUNTER — LAB ENCOUNTER (OUTPATIENT)
Dept: LAB | Facility: HOSPITAL | Age: 53
End: 2018-07-29
Attending: PAIN MEDICINE
Payer: COMMERCIAL

## 2018-07-29 DIAGNOSIS — R20.0 FACIAL NUMBNESS: Primary | ICD-10-CM

## 2018-07-29 LAB
BUN SERPL-MCNC: 15 MG/DL (ref 8–20)
CREAT SERPL-MCNC: 1 MG/DL (ref 0.5–1.5)

## 2018-07-29 PROCEDURE — 84520 ASSAY OF UREA NITROGEN: CPT

## 2018-07-29 PROCEDURE — 36415 COLL VENOUS BLD VENIPUNCTURE: CPT

## 2018-07-29 PROCEDURE — 82565 ASSAY OF CREATININE: CPT

## 2018-08-08 ENCOUNTER — OFFICE VISIT (OUTPATIENT)
Dept: INTERNAL MEDICINE CLINIC | Facility: CLINIC | Age: 53
End: 2018-08-08
Payer: COMMERCIAL

## 2018-08-08 ENCOUNTER — MED REC SCAN ONLY (OUTPATIENT)
Dept: INTERNAL MEDICINE CLINIC | Facility: CLINIC | Age: 53
End: 2018-08-08

## 2018-08-08 VITALS
BODY MASS INDEX: 30.21 KG/M2 | TEMPERATURE: 98 F | HEART RATE: 76 BPM | RESPIRATION RATE: 18 BRPM | SYSTOLIC BLOOD PRESSURE: 114 MMHG | HEIGHT: 63 IN | DIASTOLIC BLOOD PRESSURE: 77 MMHG | WEIGHT: 170.5 LBS

## 2018-08-08 DIAGNOSIS — Z00.00 ROUTINE HEALTH MAINTENANCE: Primary | ICD-10-CM

## 2018-08-08 DIAGNOSIS — R39.9 URINARY SYMPTOM OR SIGN: ICD-10-CM

## 2018-08-08 DIAGNOSIS — E11.9 CONTROLLED TYPE 2 DIABETES MELLITUS WITHOUT COMPLICATION, WITHOUT LONG-TERM CURRENT USE OF INSULIN (HCC): ICD-10-CM

## 2018-08-08 DIAGNOSIS — G47.10 HYPERSOMNIA: ICD-10-CM

## 2018-08-08 PROBLEM — R40.0 DAYTIME SOMNOLENCE: Status: ACTIVE | Noted: 2018-08-08

## 2018-08-08 PROBLEM — R92.2 DENSE BREAST: Status: RESOLVED | Noted: 2017-11-06 | Resolved: 2018-08-08

## 2018-08-08 PROBLEM — R92.30 DENSE BREAST: Status: RESOLVED | Noted: 2017-11-06 | Resolved: 2018-08-08

## 2018-08-08 PROBLEM — R51.9 OCCIPITAL PAIN: Status: RESOLVED | Noted: 2017-11-06 | Resolved: 2018-08-08

## 2018-08-08 PROBLEM — M25.512 ACUTE PAIN OF LEFT SHOULDER: Status: RESOLVED | Noted: 2018-01-15 | Resolved: 2018-08-08

## 2018-08-08 LAB
APPEARANCE: CLEAR
BILIRUBIN: NEGATIVE
GLUCOSE (URINE DIPSTICK): NEGATIVE MG/DL
KETONES (URINE DIPSTICK): NEGATIVE MG/DL
MULTISTIX LOT#: ABNORMAL NUMERIC
NITRITE, URINE: NEGATIVE
OCCULT BLOOD: NEGATIVE
PH, URINE: 5 (ref 4.5–8)
PROTEIN (URINE DIPSTICK): NEGATIVE MG/DL
SPECIFIC GRAVITY: 1 (ref 1–1.03)
URINE-COLOR: YELLOW
UROBILINOGEN,SEMI-QN: 0.2 MG/DL (ref 0–1.9)

## 2018-08-08 PROCEDURE — 81002 URINALYSIS NONAUTO W/O SCOPE: CPT | Performed by: INTERNAL MEDICINE

## 2018-08-08 PROCEDURE — 99214 OFFICE O/P EST MOD 30 MIN: CPT | Performed by: INTERNAL MEDICINE

## 2018-08-08 PROCEDURE — 99212 OFFICE O/P EST SF 10 MIN: CPT | Performed by: INTERNAL MEDICINE

## 2018-08-08 PROCEDURE — 99396 PREV VISIT EST AGE 40-64: CPT | Performed by: INTERNAL MEDICINE

## 2018-08-08 RX ORDER — GLIMEPIRIDE 2 MG/1
2 TABLET ORAL
Qty: 30 TABLET | Refills: 3 | Status: SHIPPED | OUTPATIENT
Start: 2018-08-08 | End: 2018-09-24

## 2018-08-08 RX ORDER — NITROFURANTOIN 25; 75 MG/1; MG/1
100 CAPSULE ORAL 2 TIMES DAILY
Qty: 14 CAPSULE | Refills: 0 | Status: SHIPPED | OUTPATIENT
Start: 2018-08-08 | End: 2018-08-15

## 2018-08-08 NOTE — PROGRESS NOTES
HPI:    Patient ID: Westley Green is a 52year old female. Pt is here for a physical.    Pt went for an injection in her neck which helped some. She has a little mild residual pain.   The orthopedist ordered an MRI of the brain which she had done to shortness of breath and wheezing. Cardiovascular: Negative for chest pain and palpitations. Gastrointestinal: Positive for diarrhea. Negative for nausea, vomiting, abdominal pain and blood in stool.    Endocrine: Negative for cold intolerance and heat SR 24 Hr Take 1 capsule by mouth once daily.  Disp: 90 capsule Rfl: 0       Allergies:No Known Allergies     Smoking status: Never Smoker                                                              Smokeless tobacco: Never Used                      Alcohol time. No cranial nerve deficit. Gait normal.   Skin: Skin is warm and dry. No rash noted. Psychiatric: She has a normal mood and affect.  Thought content normal.              ASSESSMENT/PLAN:     Problem List Items Addressed This Visit        High    Rout Prescriptions Disp Refills    glimepiride 2 MG Oral Tab 30 tablet 3      Sig: Take 1 tablet (2 mg total) by mouth daily with breakfast.      Nitrofurantoin Monohyd Macro (MACROBID) 100 MG Oral Cap 14 capsule 0      Sig: Take 1 capsule (100 mg total) by nhung

## 2018-08-09 NOTE — ASSESSMENT & PLAN NOTE
The patient has new onset diabetes. Her A1c was 6.7, however she cannot tolerate the metformin due to diarrhea. We will decrease it from 2000 mg daily to 1000 mg daily and add glimepiride. I urged her to schedule an appointment with the dietitian.

## 2018-08-09 NOTE — ASSESSMENT & PLAN NOTE
Patient has been having urinary symptoms. Her urine dipstick is abnormal, so I will treat empirically for urinary tract infection and send the urine for culture.

## 2018-08-09 NOTE — ASSESSMENT & PLAN NOTE
Pt c/o severe daytime drowsiness and has an Epson Sleep score of 13. I discussed with the patient that she may have sleep apnea and that it increases the risk of stroke.   I recommended that she do a home sleep study and discussed that if it is  Positive,

## 2018-08-12 ENCOUNTER — LAB ENCOUNTER (OUTPATIENT)
Dept: LAB | Facility: HOSPITAL | Age: 53
End: 2018-08-12
Attending: INTERNAL MEDICINE
Payer: COMMERCIAL

## 2018-08-12 DIAGNOSIS — Z00.00 ROUTINE HEALTH MAINTENANCE: ICD-10-CM

## 2018-08-12 LAB
ALBUMIN SERPL BCP-MCNC: 4.2 G/DL (ref 3.5–4.8)
ALBUMIN/GLOB SERPL: 1.3 {RATIO} (ref 1–2)
ALP SERPL-CCNC: 96 U/L (ref 32–100)
ALT SERPL-CCNC: 21 U/L (ref 14–54)
ANION GAP SERPL CALC-SCNC: 7 MMOL/L (ref 0–18)
AST SERPL-CCNC: 19 U/L (ref 15–41)
BASOPHILS # BLD: 0 K/UL (ref 0–0.2)
BASOPHILS NFR BLD: 0 %
BILIRUB SERPL-MCNC: 0.7 MG/DL (ref 0.3–1.2)
BUN SERPL-MCNC: 9 MG/DL (ref 8–20)
BUN/CREAT SERPL: 9.3 (ref 10–20)
CALCIUM SERPL-MCNC: 9.8 MG/DL (ref 8.5–10.5)
CHLORIDE SERPL-SCNC: 105 MMOL/L (ref 95–110)
CHOLEST SERPL-MCNC: 208 MG/DL (ref 110–200)
CO2 SERPL-SCNC: 30 MMOL/L (ref 22–32)
CREAT SERPL-MCNC: 0.97 MG/DL (ref 0.5–1.5)
EOSINOPHIL # BLD: 0.1 K/UL (ref 0–0.7)
EOSINOPHIL NFR BLD: 2 %
ERYTHROCYTE [DISTWIDTH] IN BLOOD BY AUTOMATED COUNT: 13.6 % (ref 11–15)
GLOBULIN PLAS-MCNC: 3.2 G/DL (ref 2.5–3.7)
GLUCOSE SERPL-MCNC: 94 MG/DL (ref 70–99)
HCT VFR BLD AUTO: 40.7 % (ref 35–48)
HDLC SERPL-MCNC: 62 MG/DL
HGB BLD-MCNC: 13 G/DL (ref 12–16)
LDLC SERPL CALC-MCNC: 126 MG/DL (ref 0–99)
LYMPHOCYTES # BLD: 3.3 K/UL (ref 1–4)
LYMPHOCYTES NFR BLD: 39 %
MCH RBC QN AUTO: 28.7 PG (ref 27–32)
MCHC RBC AUTO-ENTMCNC: 32 G/DL (ref 32–37)
MCV RBC AUTO: 89.7 FL (ref 80–100)
MONOCYTES # BLD: 0.6 K/UL (ref 0–1)
MONOCYTES NFR BLD: 7 %
NEUTROPHILS # BLD AUTO: 4.4 K/UL (ref 1.8–7.7)
NEUTROPHILS NFR BLD: 52 %
NONHDLC SERPL-MCNC: 146 MG/DL
OSMOLALITY UR CALC.SUM OF ELEC: 292 MOSM/KG (ref 275–295)
PATIENT FASTING: YES
PLATELET # BLD AUTO: 248 K/UL (ref 140–400)
PMV BLD AUTO: 9 FL (ref 7.4–10.3)
POTASSIUM SERPL-SCNC: 5.4 MMOL/L (ref 3.3–5.1)
PROT SERPL-MCNC: 7.4 G/DL (ref 5.9–8.4)
RBC # BLD AUTO: 4.54 M/UL (ref 3.7–5.4)
SODIUM SERPL-SCNC: 142 MMOL/L (ref 136–144)
TRIGL SERPL-MCNC: 99 MG/DL (ref 1–149)
TSH SERPL-ACNC: 2.61 UIU/ML (ref 0.45–5.33)
WBC # BLD AUTO: 8.4 K/UL (ref 4–11)

## 2018-08-12 PROCEDURE — 85025 COMPLETE CBC W/AUTO DIFF WBC: CPT

## 2018-08-12 PROCEDURE — 80053 COMPREHEN METABOLIC PANEL: CPT

## 2018-08-12 PROCEDURE — 36415 COLL VENOUS BLD VENIPUNCTURE: CPT

## 2018-08-12 PROCEDURE — 83036 HEMOGLOBIN GLYCOSYLATED A1C: CPT

## 2018-08-12 PROCEDURE — 84443 ASSAY THYROID STIM HORMONE: CPT

## 2018-08-12 PROCEDURE — 80061 LIPID PANEL: CPT

## 2018-08-13 LAB — HBA1C MFR BLD: 6.6 % (ref 4–6)

## 2018-09-24 DIAGNOSIS — E11.9 CONTROLLED TYPE 2 DIABETES MELLITUS WITHOUT COMPLICATION, WITHOUT LONG-TERM CURRENT USE OF INSULIN (HCC): ICD-10-CM

## 2018-09-24 RX ORDER — GLIMEPIRIDE 2 MG/1
2 TABLET ORAL
Qty: 30 TABLET | Refills: 2 | Status: SHIPPED | OUTPATIENT
Start: 2018-09-24 | End: 2018-09-28

## 2018-09-25 ENCOUNTER — OFFICE VISIT (OUTPATIENT)
Dept: SLEEP CENTER | Age: 53
End: 2018-09-25
Attending: INTERNAL MEDICINE
Payer: COMMERCIAL

## 2018-09-25 DIAGNOSIS — G47.10 HYPERSOMNIA: ICD-10-CM

## 2018-09-25 PROCEDURE — 95806 SLEEP STUDY UNATT&RESP EFFT: CPT

## 2018-09-25 NOTE — TELEPHONE ENCOUNTER
Diabetes Medications  Protocol Criteria:  · Appointment scheduled in the past 6 months or the next 3 months  · A1C < 7.5 in the past 6 months  · Creatinine in the past 12 months  · Creatinine result < 1.5   Recent Outpatient Visits            1 month ago R

## 2018-09-28 ENCOUNTER — PATIENT MESSAGE (OUTPATIENT)
Dept: INTERNAL MEDICINE CLINIC | Facility: CLINIC | Age: 53
End: 2018-09-28

## 2018-09-28 DIAGNOSIS — E11.9 CONTROLLED TYPE 2 DIABETES MELLITUS WITHOUT COMPLICATION, WITHOUT LONG-TERM CURRENT USE OF INSULIN (HCC): ICD-10-CM

## 2018-09-28 NOTE — TELEPHONE ENCOUNTER
Message sent to the patient for pharmacy clarification. Please see also refill request 9/28/18. Once patient responded regarding the pharmacy,please close this encounter as there is already refill request pended.     From: Rocio Hernandez  To: Elda Canales

## 2018-09-28 NOTE — TELEPHONE ENCOUNTER
Please see [employer] message 9/28/18 before refilling this medication,(pharmacy needs to update) still waiting for patient's response regarding pharmacy as this nurse cannot find the pharmacy in our data list when putting the phone numbers that she provided.

## 2018-09-29 RX ORDER — GLIMEPIRIDE 2 MG/1
2 TABLET ORAL
Qty: 90 TABLET | Refills: 0 | Status: SHIPPED | OUTPATIENT
Start: 2018-09-29 | End: 2018-10-16

## 2018-09-29 NOTE — PROCEDURES
[facility]  Accredited by the [facility] ([facility])    PATIENT'S NAME: Omar Shelton PHYSICIAN: Mireya Yanez MD   REFERRING PHYSICIAN: Mireya Yanez MD   PATIENT ACCOUNT #: [de-identified] LOCATION: breathing but has very significant clinical syndrome with pathological daytime sleepiness. 2.   Weight loss. 3.   Avoid alcohol. 4.   Avoid sedating drug. 5.   The patient should not drive if at all sleepy.     Please do not hesitate to contact me if

## 2018-10-01 ENCOUNTER — TELEPHONE (OUTPATIENT)
Dept: INTERNAL MEDICINE CLINIC | Facility: CLINIC | Age: 53
End: 2018-10-01

## 2018-10-01 NOTE — TELEPHONE ENCOUNTER
Per MAR 90 day supply sent 9/29/18. Spoke with [facility] who state they do have the order escribed 9/29/18 and it is in process.

## 2018-10-01 NOTE — TELEPHONE ENCOUNTER
GLIMEPIRIDE 2MG      PER PHARMACY:  ALTERNATIVE REQUESTED : NEED NEW RX PLEASE FOR 90 DAYS AS INS WILL COVER IF RX IS FOR 90      [facility]   [address]   [phone]

## 2018-10-16 ENCOUNTER — OFFICE VISIT (OUTPATIENT)
Dept: INTERNAL MEDICINE CLINIC | Facility: CLINIC | Age: 53
End: 2018-10-16
Payer: COMMERCIAL

## 2018-10-16 VITALS
HEART RATE: 70 BPM | BODY MASS INDEX: 33.08 KG/M2 | WEIGHT: 186.69 LBS | DIASTOLIC BLOOD PRESSURE: 75 MMHG | HEIGHT: 63 IN | SYSTOLIC BLOOD PRESSURE: 113 MMHG

## 2018-10-16 DIAGNOSIS — R40.0 DAYTIME SOMNOLENCE: ICD-10-CM

## 2018-10-16 DIAGNOSIS — E11.9 CONTROLLED TYPE 2 DIABETES MELLITUS WITHOUT COMPLICATION, WITHOUT LONG-TERM CURRENT USE OF INSULIN (HCC): Primary | ICD-10-CM

## 2018-10-16 DIAGNOSIS — K21.9 GASTROESOPHAGEAL REFLUX DISEASE, ESOPHAGITIS PRESENCE NOT SPECIFIED: ICD-10-CM

## 2018-10-16 PROBLEM — R39.9 URINARY SYMPTOM OR SIGN: Status: RESOLVED | Noted: 2017-11-06 | Resolved: 2018-10-16

## 2018-10-16 PROCEDURE — 99214 OFFICE O/P EST MOD 30 MIN: CPT | Performed by: INTERNAL MEDICINE

## 2018-10-16 PROCEDURE — 99212 OFFICE O/P EST SF 10 MIN: CPT | Performed by: INTERNAL MEDICINE

## 2018-10-16 RX ORDER — RANITIDINE 150 MG/1
150 CAPSULE ORAL NIGHTLY
Qty: 90 CAPSULE | Refills: 3 | Status: SHIPPED | OUTPATIENT
Start: 2018-10-16 | End: 2020-01-28 | Stop reason: ALTCHOICE

## 2018-10-16 RX ORDER — PANTOPRAZOLE SODIUM 40 MG/1
40 TABLET, DELAYED RELEASE ORAL EVERY MORNING
Qty: 90 TABLET | Refills: 1 | Status: SHIPPED | OUTPATIENT
Start: 2018-10-16 | End: 2019-04-10

## 2018-10-16 RX ORDER — METFORMIN HYDROCHLORIDE 500 MG/1
TABLET, EXTENDED RELEASE ORAL
Qty: 180 TABLET | Refills: 1 | Status: SHIPPED | OUTPATIENT
Start: 2018-10-16 | End: 2019-04-04

## 2018-10-16 NOTE — ASSESSMENT & PLAN NOTE
We decreased her metformin because of her diarrhea and added glimepiride, but she cannot tolerate the glimepiride due to side effects. I have advised her to stop the glimepiride but keep the metformin at the lower dose. We will check her A1c in 1 month.

## 2018-10-16 NOTE — PROGRESS NOTES
HPI:    Patient ID: Xavier Mosquera is a 52year old female. Pt did the sleep study and it showed very mild sleep apnea. Pt sleeps 6 hours at night. She doesn't have trouble falling asleep and she could sleep late if she didn't have to go to work. HISTORY      per NG: excision lession, local   • TONSILLECTOMY              Current Outpatient Medications:  MetFORMIN HCl  MG Oral Tablet 24 Hr TAKE 2 TABLETS DAILY WITH  BREAKFAST Disp: 180 tablet Rfl: 1   Pantoprazole Sodium 40 MG Oral Tab EC Take List Items Addressed This Visit        Medium    Controlled type 2 diabetes mellitus without complication, without long-term current use of insulin ([facility] [address] - Primary     We decreased her metformin because of her diarrhea and added glimepiride, but she cannot

## 2018-10-16 NOTE — PATIENT INSTRUCTIONS
Please call the [facility] at [phone], Option 2 to schedule your appointment. Non-fasting labs next month.

## 2018-10-16 NOTE — ASSESSMENT & PLAN NOTE
I reviewed the patient's sleep study results with her. She has only mild sleep apnea.   We discussed her sleep problem and it appears that she actually sleeps quite well but she only gets 6 hours of sleep at night because she goes to bed late and gets up e

## 2018-11-21 ENCOUNTER — APPOINTMENT (OUTPATIENT)
Dept: LAB | Facility: HOSPITAL | Age: 53
End: 2018-11-21
Attending: INTERNAL MEDICINE
Payer: COMMERCIAL

## 2018-11-21 DIAGNOSIS — E11.9 CONTROLLED TYPE 2 DIABETES MELLITUS WITHOUT COMPLICATION, WITHOUT LONG-TERM CURRENT USE OF INSULIN (HCC): ICD-10-CM

## 2018-11-21 PROCEDURE — 36415 COLL VENOUS BLD VENIPUNCTURE: CPT

## 2018-11-21 PROCEDURE — 83036 HEMOGLOBIN GLYCOSYLATED A1C: CPT

## 2018-12-05 ENCOUNTER — OFFICE VISIT (OUTPATIENT)
Dept: INTERNAL MEDICINE CLINIC | Facility: CLINIC | Age: 53
End: 2018-12-05
Payer: COMMERCIAL

## 2018-12-05 VITALS
SYSTOLIC BLOOD PRESSURE: 127 MMHG | HEIGHT: 63 IN | BODY MASS INDEX: 33.4 KG/M2 | WEIGHT: 188.5 LBS | HEART RATE: 81 BPM | DIASTOLIC BLOOD PRESSURE: 80 MMHG

## 2018-12-05 DIAGNOSIS — E66.9 OBESITY (BMI 30-39.9): ICD-10-CM

## 2018-12-05 DIAGNOSIS — Z23 NEED FOR VACCINATION: ICD-10-CM

## 2018-12-05 DIAGNOSIS — R05.9 COUGH: ICD-10-CM

## 2018-12-05 DIAGNOSIS — E11.9 CONTROLLED TYPE 2 DIABETES MELLITUS WITHOUT COMPLICATION, WITHOUT LONG-TERM CURRENT USE OF INSULIN (HCC): Primary | ICD-10-CM

## 2018-12-05 DIAGNOSIS — E78.00 HYPERCHOLESTEROLEMIA: ICD-10-CM

## 2018-12-05 PROCEDURE — 90471 IMMUNIZATION ADMIN: CPT | Performed by: INTERNAL MEDICINE

## 2018-12-05 PROCEDURE — 90732 PPSV23 VACC 2 YRS+ SUBQ/IM: CPT | Performed by: INTERNAL MEDICINE

## 2018-12-05 PROCEDURE — 99212 OFFICE O/P EST SF 10 MIN: CPT | Performed by: INTERNAL MEDICINE

## 2018-12-05 PROCEDURE — 99214 OFFICE O/P EST MOD 30 MIN: CPT | Performed by: INTERNAL MEDICINE

## 2018-12-05 RX ORDER — ATORVASTATIN CALCIUM 10 MG/1
10 TABLET, FILM COATED ORAL DAILY
Qty: 90 TABLET | Refills: 1 | Status: SHIPPED | OUTPATIENT
Start: 2018-12-05 | End: 2019-05-23

## 2018-12-05 NOTE — PROGRESS NOTES
HPI:    Patient ID: tIa Persaud is a 52year old female. Her dizzy spells went away when she resumed the sertraline. Her depression is about the same. Her job is being eliminated. She might take time off and exercise. She has not lost weight. MetFORMIN HCl  MG Oral Tablet 24 Hr TAKE 2 TABLETS DAILY WITH  BREAKFAST Disp: 180 tablet Rfl: 1   Pantoprazole Sodium 40 MG Oral Tab EC Take 1 tablet (40 mg total) by mouth every morning.  Disp: 90 tablet Rfl: 1   RaNITidine HCl 150 MG Oral [place] will continue with his current dose of metformin. Unprioritized    Obesity (BMI 30-39. 9)     Pt used Qsymia in the past and would like to resume it. She had noticed a decreased appetite and has lost weight with diet and exercise.   She [place]

## 2018-12-05 NOTE — ASSESSMENT & PLAN NOTE
Patient stop the glimepiride due to side effects. She decrease the metformin from 4 to 2 tablets daily due to diarrhea. Her symptoms are now tolerable. Her A1c was 6.9. We will continue with his current dose of metformin.

## 2018-12-05 NOTE — ASSESSMENT & PLAN NOTE
Pt used Qsymia in the past and would like to resume it. She had noticed a decreased appetite and has lost weight with diet and exercise. She understands possible CV risks. Will resume Qsymia.

## 2018-12-05 NOTE — ASSESSMENT & PLAN NOTE
Patient has an intermittent mild cough. She will try to notice if it is different on the days she takes the pantoprazole compared with the day she takes the ranitidine. It may be secondary to GERD. Follow-up in 3 months.

## 2018-12-05 NOTE — PATIENT INSTRUCTIONS
Please call the [facility] for Diabetes at [phone], Option 2 to schedule your appointment. Do fasting labs 1 week or so before your next appointment. Fast for 12 hours. Water and meds are okay. Walk in.

## 2018-12-05 NOTE — ASSESSMENT & PLAN NOTE
Counseled pt regarding the benefits of treating hypercholesterolemia with a statin in patients with diabetes. Plan no improvement in mortality. Discussed possible side effects. Patient agreed to a trial of a statin.   We will recheck fasting lipids and l

## 2018-12-19 ENCOUNTER — HOSPITAL ENCOUNTER (OUTPATIENT)
Dept: MAMMOGRAPHY | Facility: HOSPITAL | Age: 53
Discharge: HOME | End: 2018-12-19
Attending: INTERNAL MEDICINE
Payer: COMMERCIAL

## 2018-12-19 DIAGNOSIS — Z00.00 ROUTINE HEALTH MAINTENANCE: ICD-10-CM

## 2018-12-19 PROCEDURE — 77067 SCR MAMMO BI INCL CAD: CPT | Performed by: INTERNAL MEDICINE

## 2018-12-19 PROCEDURE — 77063 BREAST TOMOSYNTHESIS BI: CPT | Performed by: INTERNAL MEDICINE

## 2019-01-14 DIAGNOSIS — F41.9 ANXIETY: ICD-10-CM

## 2019-04-04 DIAGNOSIS — E11.9 CONTROLLED TYPE 2 DIABETES MELLITUS WITHOUT COMPLICATION, WITHOUT LONG-TERM CURRENT USE OF INSULIN (HCC): ICD-10-CM

## 2019-04-04 RX ORDER — METFORMIN HYDROCHLORIDE 500 MG/1
TABLET, EXTENDED RELEASE ORAL
Qty: 180 TABLET | Refills: 0 | Status: SHIPPED | OUTPATIENT
Start: 2019-04-04 | End: 2019-06-24

## 2019-04-05 NOTE — TELEPHONE ENCOUNTER
Refill passed per [facility], [facility] protocol.     Diabetes Medications  Protocol Criteria:  · Appointment scheduled in the past 6 months or the next 3 months  · A1C < 7.5 in the past 6 months  · Creatinine in the past 12 months  · Creatinine result < 1.5   Re

## 2019-04-06 DIAGNOSIS — F41.9 ANXIETY: ICD-10-CM

## 2019-04-06 NOTE — TELEPHONE ENCOUNTER
[facility] refill request for:    •  Sertraline HCl 50 MG Oral Tab, Take 1 tablet (50 mg total) by mouth daily. , Disp: 90 tablet, Rfl: 0

## 2019-04-07 NOTE — TELEPHONE ENCOUNTER
Refilled per protocol. Requested Prescriptions   Pending Prescriptions Disp Refills   • Sertraline HCl 50 MG Oral Tab 90 tablet 0     Sig: Take 1 tablet (50 mg total) by mouth daily.        Psychiatric Non-Scheduled (Anti-Anxiety) Passed - 4/6/2019  1:18 P

## 2019-04-10 ENCOUNTER — OFFICE VISIT (OUTPATIENT)
Dept: INTERNAL MEDICINE CLINIC | Facility: CLINIC | Age: 54
End: 2019-04-10
Payer: COMMERCIAL

## 2019-04-10 VITALS
DIASTOLIC BLOOD PRESSURE: 77 MMHG | HEIGHT: 63 IN | BODY MASS INDEX: 33.22 KG/M2 | HEART RATE: 84 BPM | WEIGHT: 187.5 LBS | SYSTOLIC BLOOD PRESSURE: 115 MMHG

## 2019-04-10 DIAGNOSIS — F41.9 ANXIETY AND DEPRESSION: ICD-10-CM

## 2019-04-10 DIAGNOSIS — K21.9 GASTROESOPHAGEAL REFLUX DISEASE, ESOPHAGITIS PRESENCE NOT SPECIFIED: ICD-10-CM

## 2019-04-10 DIAGNOSIS — E11.9 CONTROLLED TYPE 2 DIABETES MELLITUS WITHOUT COMPLICATION, WITHOUT LONG-TERM CURRENT USE OF INSULIN (HCC): Primary | ICD-10-CM

## 2019-04-10 DIAGNOSIS — F32.A ANXIETY AND DEPRESSION: ICD-10-CM

## 2019-04-10 DIAGNOSIS — E66.9 OBESITY (BMI 30-39.9): ICD-10-CM

## 2019-04-10 PROCEDURE — 99212 OFFICE O/P EST SF 10 MIN: CPT | Performed by: INTERNAL MEDICINE

## 2019-04-10 PROCEDURE — 99214 OFFICE O/P EST MOD 30 MIN: CPT | Performed by: INTERNAL MEDICINE

## 2019-04-10 RX ORDER — PANTOPRAZOLE SODIUM 40 MG/1
40 TABLET, DELAYED RELEASE ORAL EVERY MORNING
Qty: 90 TABLET | Refills: 1 | Status: SHIPPED | OUTPATIENT
Start: 2019-04-10 | End: 2020-07-09

## 2019-04-10 NOTE — PROGRESS NOTES
HPI:    Patient ID: Gemma Moran is a 62year old female. Pt had a forced MCC and this is the first week! She will take a few months off. She tried the Qsymia for 2 months and it didn't help so she stopped it.   Sometimes she is a little de every morning. Disp: 90 tablet Rfl: 1   Sertraline HCl 50 MG Oral Tab Take 1 tablet (50 mg total) by mouth daily.  Disp: 90 tablet Rfl: 0   METFORMIN HCL  MG Oral Tablet 24 Hr TAKE 2 TABLETS DAILY WITH  BREAKFAST Disp: 180 tablet Rfl: 0   atorvastatin nephropathy or retinopathy. F/u in 4 months. Relevant Orders    MICROALB/CREAT RATIO, RANDOM URINE       Unprioritized    Gastroesophageal reflux disease     Controlled. Continue present management.          Relevant Medications    Pantoprazole So

## 2019-04-10 NOTE — ASSESSMENT & PLAN NOTE
Pt did not do the blood tests. She will schedule them. She is due for the eye appointment next month. The patient does not have neuropathy, nephropathy or retinopathy. F/u in 4 months.

## 2019-04-12 ENCOUNTER — APPOINTMENT (OUTPATIENT)
Dept: LAB | Age: 54
End: 2019-04-12
Attending: INTERNAL MEDICINE
Payer: COMMERCIAL

## 2019-04-12 DIAGNOSIS — E78.00 HYPERCHOLESTEROLEMIA: ICD-10-CM

## 2019-04-12 DIAGNOSIS — E11.9 CONTROLLED TYPE 2 DIABETES MELLITUS WITHOUT COMPLICATION, WITHOUT LONG-TERM CURRENT USE OF INSULIN (HCC): ICD-10-CM

## 2019-04-12 PROCEDURE — 82570 ASSAY OF URINE CREATININE: CPT

## 2019-04-12 PROCEDURE — 36415 COLL VENOUS BLD VENIPUNCTURE: CPT

## 2019-04-12 PROCEDURE — 83036 HEMOGLOBIN GLYCOSYLATED A1C: CPT

## 2019-04-12 PROCEDURE — 82043 UR ALBUMIN QUANTITATIVE: CPT

## 2019-04-12 PROCEDURE — 80061 LIPID PANEL: CPT

## 2019-04-12 PROCEDURE — 80053 COMPREHEN METABOLIC PANEL: CPT

## 2019-05-15 ENCOUNTER — MED REC SCAN ONLY (OUTPATIENT)
Dept: INTERNAL MEDICINE CLINIC | Facility: CLINIC | Age: 54
End: 2019-05-15

## 2019-05-23 DIAGNOSIS — E78.00 HYPERCHOLESTEROLEMIA: ICD-10-CM

## 2019-05-23 RX ORDER — ATORVASTATIN CALCIUM 10 MG/1
TABLET, FILM COATED ORAL
Qty: 90 TABLET | Refills: 1 | Status: SHIPPED | OUTPATIENT
Start: 2019-05-23 | End: 2019-11-19

## 2019-06-24 DIAGNOSIS — K21.9 GASTROESOPHAGEAL REFLUX DISEASE, ESOPHAGITIS PRESENCE NOT SPECIFIED: ICD-10-CM

## 2019-06-24 DIAGNOSIS — F41.9 ANXIETY: ICD-10-CM

## 2019-06-24 DIAGNOSIS — E11.9 CONTROLLED TYPE 2 DIABETES MELLITUS WITHOUT COMPLICATION, WITHOUT LONG-TERM CURRENT USE OF INSULIN (HCC): ICD-10-CM

## 2019-06-24 NOTE — TELEPHONE ENCOUNTER
Fax received requesting additional refills. Current Outpatient Medications:  Sertraline HCl 50 MG Oral Tab Take 1 tablet (50 mg total) by mouth daily.  Disp: 90 tablet Rfl: 0

## 2019-06-25 RX ORDER — METFORMIN HYDROCHLORIDE 500 MG/1
TABLET, EXTENDED RELEASE ORAL
Qty: 180 TABLET | Refills: 1 | Status: SHIPPED | OUTPATIENT
Start: 2019-06-25 | End: 2019-08-27

## 2019-06-25 RX ORDER — PANTOPRAZOLE SODIUM 40 MG/1
TABLET, DELAYED RELEASE ORAL
Qty: 90 TABLET | Refills: 1 | Status: SHIPPED | OUTPATIENT
Start: 2019-06-25 | End: 2019-08-27

## 2019-06-25 NOTE — TELEPHONE ENCOUNTER
Per Melissa ARRIAGA They did not receive the medication sent on 4/10/19. Last filled on script from 10/16/18. I cannot sent due to the patient needs an override because she is also on Ranitidine.

## 2019-06-25 NOTE — TELEPHONE ENCOUNTER
Please check with pharm. Pt should still have 1 90day refill left on her pantoprazole. Refill passed per [facility], [facility] protocol.   Diabetes Medications  Protocol Criteria:  · Appointment scheduled in the past 6 months or the next 3 months  · A1C < 7.5 i [facility])    Reny Cleaning MD    Office Visit    8 months ago Controlled type 2 diabetes mellitus without complication, without long-term current use of insulin [facility])    [facility], [facility], [address], [place], [place]

## 2019-08-24 ENCOUNTER — LAB ENCOUNTER (OUTPATIENT)
Dept: LAB | Age: 54
End: 2019-08-24
Attending: INTERNAL MEDICINE
Payer: COMMERCIAL

## 2019-08-24 DIAGNOSIS — E11.9 CONTROLLED TYPE 2 DIABETES MELLITUS WITHOUT COMPLICATION, WITHOUT LONG-TERM CURRENT USE OF INSULIN (HCC): ICD-10-CM

## 2019-08-24 LAB
EST. AVERAGE GLUCOSE BLD GHB EST-MCNC: 160 MG/DL (ref 68–126)
HBA1C MFR BLD HPLC: 7.2 % (ref ?–5.7)

## 2019-08-24 PROCEDURE — 36415 COLL VENOUS BLD VENIPUNCTURE: CPT

## 2019-08-24 PROCEDURE — 83036 HEMOGLOBIN GLYCOSYLATED A1C: CPT

## 2019-08-27 ENCOUNTER — OFFICE VISIT (OUTPATIENT)
Dept: INTERNAL MEDICINE CLINIC | Facility: CLINIC | Age: 54
End: 2019-08-27
Payer: COMMERCIAL

## 2019-08-27 VITALS
TEMPERATURE: 98 F | BODY MASS INDEX: 34.31 KG/M2 | SYSTOLIC BLOOD PRESSURE: 126 MMHG | HEIGHT: 63 IN | HEART RATE: 72 BPM | RESPIRATION RATE: 16 BRPM | WEIGHT: 193.63 LBS | DIASTOLIC BLOOD PRESSURE: 82 MMHG

## 2019-08-27 DIAGNOSIS — E78.00 HYPERCHOLESTEROLEMIA: ICD-10-CM

## 2019-08-27 DIAGNOSIS — E11.65 UNCONTROLLED TYPE 2 DIABETES MELLITUS WITH HYPERGLYCEMIA, WITHOUT LONG-TERM CURRENT USE OF INSULIN (HCC): ICD-10-CM

## 2019-08-27 DIAGNOSIS — Z12.39 BREAST CANCER SCREENING: ICD-10-CM

## 2019-08-27 DIAGNOSIS — F41.9 ANXIETY: ICD-10-CM

## 2019-08-27 DIAGNOSIS — Z00.00 ROUTINE HEALTH MAINTENANCE: Primary | ICD-10-CM

## 2019-08-27 PROCEDURE — 99396 PREV VISIT EST AGE 40-64: CPT | Performed by: INTERNAL MEDICINE

## 2019-08-27 PROCEDURE — 99214 OFFICE O/P EST MOD 30 MIN: CPT | Performed by: INTERNAL MEDICINE

## 2019-08-27 RX ORDER — METFORMIN HYDROCHLORIDE 500 MG/1
500 TABLET, EXTENDED RELEASE ORAL
Qty: 90 TABLET | Refills: 1 | Status: SHIPPED | OUTPATIENT
Start: 2019-08-27 | End: 2019-09-11

## 2019-08-27 NOTE — PROGRESS NOTES
HPI:    Patient ID: Estelita Farias is a 62year old female. Pt is here for a physical.    Pt c/o liquid diarrhea in the morning within a half hour after eating. She has had it since starting the metformin. She used to be constipated.   She would [employer] intolerance and heat intolerance. Genitourinary: Negative for dysuria and hematuria. Musculoskeletal: Negative for joint pain. Skin: Negative for rash. Allergic/Immunologic: Negative for environmental allergies and food allergies.    Neurological: N Hypertension Father    • Gastro-Intestinal Disorder Other         family h/o diverticular disease       . /82 (BP Location: Right arm, Patient Position: Sitting, Cuff Size: large)   Pulse 72   Temp 98.3 °F (36.8 °C) (Oral)   Resp 16   Ht 5' 3\" (1.6 m Monofilament Exam: 5 sites tested. 5 sites sensed. Skin Integrity: Negative for ulcer, blister, skin breakdown or erythema. Left Foot:   Monofilament Exam: 5 sites tested. 5 sites sensed.    Skin Integrity: Negative for ulcer, blister, skin breakdown with the plan.     Meds This Visit:  Requested Prescriptions     Signed Prescriptions Disp Refills   • metFORMIN HCl  MG Oral Tablet 24 Hr 90 tablet 1     Sig: Take 1 tablet (500 mg total) by mouth daily with breakfast.   • SITagliptin Phosphate (JANU

## 2019-08-27 NOTE — ASSESSMENT & PLAN NOTE
Pt feels better and would like to wean off of her sertraline. Advised to decrease to 25 mg daily for 2 weeks and then stop.

## 2019-08-27 NOTE — ASSESSMENT & PLAN NOTE
The pt is not tolerating metformin 1000 mg daily. Will decrease to 500 mg daily and add Januvia. Recheck labs in 3 months. The patient does not have neuropathy, nephropathy or retinopathy. Foot exam normal today.

## 2019-09-10 ENCOUNTER — PATIENT MESSAGE (OUTPATIENT)
Dept: INTERNAL MEDICINE CLINIC | Facility: CLINIC | Age: 54
End: 2019-09-10

## 2019-09-10 DIAGNOSIS — E11.65 UNCONTROLLED TYPE 2 DIABETES MELLITUS WITH HYPERGLYCEMIA, WITHOUT LONG-TERM CURRENT USE OF INSULIN (HCC): ICD-10-CM

## 2019-09-11 RX ORDER — METFORMIN HYDROCHLORIDE 500 MG/1
500 TABLET, EXTENDED RELEASE ORAL
Qty: 90 TABLET | Refills: 1 | Status: SHIPPED | OUTPATIENT
Start: 2019-09-11 | End: 2019-12-30

## 2019-09-11 NOTE — TELEPHONE ENCOUNTER
From: Shiva Johnson  To: Dave Currie MD  Sent: 9/10/2019 9:55 PM CDT  Subject: Prescription Question    Dear Dr. Jessi Gardner,    Could you please resend my prescription for SITagliptin Phosphate to the [address] in [place], [place] (Ph: [phone]

## 2019-11-01 ENCOUNTER — TELEPHONE (OUTPATIENT)
Dept: OTHER | Age: 54
End: 2019-11-01

## 2019-11-01 NOTE — TELEPHONE ENCOUNTER
----- Message from Alia Leung. Jaquelinejuana Rape sent at 11/1/2019 10:39 AM CDT -----  Regarding: Prescription Question  Contact: [phone]  Amina Flores,    During my last visit I had asked to stop my Sertraline Shaq, which I did.  After being off for about 6

## 2019-11-02 NOTE — TELEPHONE ENCOUNTER
She can probably start with a whole tablet. It usually doesn't have side effects. I just start with a half tablet for patients who have never tried it.

## 2019-11-02 NOTE — TELEPHONE ENCOUNTER
Pt calling back and states she wanted to inform Dr Luiz Floyd she is going back on the Sertraline. Pt has meds available and does not need refill    Pt states she tapered off, but is know feeling emotional, crying more, increased irritability.     Pt denies s

## 2019-11-02 NOTE — TELEPHONE ENCOUNTER
Spoke with pt and informed of Dr Bryson Gather message below. States she still have the medication and will start taking the whole tab . No refill needed at this time.

## 2019-11-06 ENCOUNTER — PATIENT MESSAGE (OUTPATIENT)
Dept: INTERNAL MEDICINE CLINIC | Facility: CLINIC | Age: 54
End: 2019-11-06

## 2019-11-07 ENCOUNTER — TELEPHONE (OUTPATIENT)
Dept: INTERNAL MEDICINE CLINIC | Facility: CLINIC | Age: 54
End: 2019-11-07

## 2019-11-07 RX ORDER — RANITIDINE 150 MG/1
150 TABLET ORAL 2 TIMES DAILY
Qty: 28 TABLET | Refills: 0 | Status: CANCELLED | OUTPATIENT
Start: 2019-11-07

## 2019-11-07 NOTE — TELEPHONE ENCOUNTER
See medication question TE 11/7/19.       From: Marlen Acevedo  To: Fatimah Anthony MD  Sent: 11/6/2019  6:35 PM CST  Subject: Prescription Question    Good evening Dr. Luiz Floyd,    I received notification today from [facility] that there was a recall

## 2019-11-07 NOTE — TELEPHONE ENCOUNTER
Dr Patel=see message below, pharmacy updated, medication pended for approval,. Will need short supply to the local pharmacy and then mail in for the maintenance. With FU OV on 11/26/19. Please forward back to the triage.       RN=please make sure the medic

## 2019-11-08 RX ORDER — FAMOTIDINE 20 MG/1
20 TABLET ORAL 2 TIMES DAILY
Qty: 28 TABLET | Refills: 0 | Status: SHIPPED | OUTPATIENT
Start: 2019-11-08 | End: 2019-11-26

## 2019-11-08 RX ORDER — FAMOTIDINE 20 MG/1
20 TABLET ORAL 2 TIMES DAILY
Qty: 180 TABLET | Refills: 1 | Status: SHIPPED | OUTPATIENT
Start: 2019-11-08 | End: 2020-04-21

## 2019-11-08 NOTE — TELEPHONE ENCOUNTER
Rxs sent. FYI, both Ranitidine and the equivalent Pepcid are available over-the-counter, so you can tell patients if they need them urgently, they can buy them over-the-counter. I only prescribe them for their convenience, not out of necessity.     Thom

## 2019-11-19 DIAGNOSIS — E78.00 HYPERCHOLESTEROLEMIA: ICD-10-CM

## 2019-11-19 RX ORDER — ATORVASTATIN CALCIUM 10 MG/1
TABLET, FILM COATED ORAL
Qty: 90 TABLET | Refills: 1 | Status: SHIPPED | OUTPATIENT
Start: 2019-11-19 | End: 2020-05-07

## 2019-11-19 NOTE — TELEPHONE ENCOUNTER
Refill passed per [facility], [facility] protocol.   Cholesterol Medications  Protocol Criteria:  · Appointment scheduled in the past 12 months or in the next 3 months  · ALT & LDL on file in the past 12 months  · ALT result < 80  · LDL result <130   Recent Outpat

## 2019-11-21 ENCOUNTER — LAB ENCOUNTER (OUTPATIENT)
Dept: LAB | Age: 54
End: 2019-11-21
Attending: INTERNAL MEDICINE
Payer: COMMERCIAL

## 2019-11-21 DIAGNOSIS — E11.65 UNCONTROLLED TYPE 2 DIABETES MELLITUS WITH HYPERGLYCEMIA, WITHOUT LONG-TERM CURRENT USE OF INSULIN (HCC): ICD-10-CM

## 2019-11-21 DIAGNOSIS — Z00.00 ROUTINE HEALTH MAINTENANCE: ICD-10-CM

## 2019-11-21 PROCEDURE — 82607 VITAMIN B-12: CPT

## 2019-11-21 PROCEDURE — 85025 COMPLETE CBC W/AUTO DIFF WBC: CPT

## 2019-11-21 PROCEDURE — 36415 COLL VENOUS BLD VENIPUNCTURE: CPT

## 2019-11-21 PROCEDURE — 84439 ASSAY OF FREE THYROXINE: CPT

## 2019-11-21 PROCEDURE — 83036 HEMOGLOBIN GLYCOSYLATED A1C: CPT

## 2019-11-26 ENCOUNTER — OFFICE VISIT (OUTPATIENT)
Dept: INTERNAL MEDICINE CLINIC | Facility: CLINIC | Age: 54
End: 2019-11-26
Payer: COMMERCIAL

## 2019-11-26 ENCOUNTER — APPOINTMENT (OUTPATIENT)
Dept: LAB | Facility: HOSPITAL | Age: 54
End: 2019-11-26
Attending: INTERNAL MEDICINE
Payer: COMMERCIAL

## 2019-11-26 VITALS
SYSTOLIC BLOOD PRESSURE: 127 MMHG | DIASTOLIC BLOOD PRESSURE: 81 MMHG | BODY MASS INDEX: 33.17 KG/M2 | HEIGHT: 63 IN | WEIGHT: 187.19 LBS | HEART RATE: 74 BPM

## 2019-11-26 DIAGNOSIS — E11.9 CONTROLLED TYPE 2 DIABETES MELLITUS WITHOUT COMPLICATION, WITHOUT LONG-TERM CURRENT USE OF INSULIN (HCC): ICD-10-CM

## 2019-11-26 DIAGNOSIS — Z00.00 ROUTINE HEALTH MAINTENANCE: ICD-10-CM

## 2019-11-26 DIAGNOSIS — D64.9 ANEMIA, UNSPECIFIED TYPE: ICD-10-CM

## 2019-11-26 DIAGNOSIS — Z12.11 SCREEN FOR COLON CANCER: ICD-10-CM

## 2019-11-26 DIAGNOSIS — G56.03 BILATERAL CARPAL TUNNEL SYNDROME: Primary | ICD-10-CM

## 2019-11-26 PROBLEM — R05.9 COUGH: Status: RESOLVED | Noted: 2018-12-05 | Resolved: 2019-11-26

## 2019-11-26 PROBLEM — E11.65 UNCONTROLLED TYPE 2 DIABETES MELLITUS WITH HYPERGLYCEMIA, WITHOUT LONG-TERM CURRENT USE OF INSULIN (HCC): Status: RESOLVED | Noted: 2019-08-27 | Resolved: 2019-11-26

## 2019-11-26 PROCEDURE — 83540 ASSAY OF IRON: CPT

## 2019-11-26 PROCEDURE — 84466 ASSAY OF TRANSFERRIN: CPT

## 2019-11-26 PROCEDURE — 99214 OFFICE O/P EST MOD 30 MIN: CPT | Performed by: INTERNAL MEDICINE

## 2019-11-26 PROCEDURE — 86803 HEPATITIS C AB TEST: CPT

## 2019-11-26 PROCEDURE — 82746 ASSAY OF FOLIC ACID SERUM: CPT

## 2019-11-26 PROCEDURE — 82728 ASSAY OF FERRITIN: CPT

## 2019-11-26 PROCEDURE — 86765 RUBEOLA ANTIBODY: CPT

## 2019-11-26 PROCEDURE — 36415 COLL VENOUS BLD VENIPUNCTURE: CPT

## 2019-11-26 NOTE — ASSESSMENT & PLAN NOTE
Advised patient to wear the wrist splint every night for a month. If no improvement she will see rheumatology.

## 2019-11-26 NOTE — ASSESSMENT & PLAN NOTE
Patient has a slight anemia. I will check iron test today. She did have a severe bloody nose 2 months ago, but I would think that anemia due to that would have resolved by now. She has had no other bleeding.   We will check a stool test and follow-up in

## 2019-11-26 NOTE — PROGRESS NOTES
HPI:    Patient ID: Brandy Garza is a 62year old female. Pt c/o numbness in fingers 1-4 bilat when she wakes up for the past month. It lasts a few minutes. She also constant pain in the base of the thumbs.   She fell onto her face 2 months ago an SITagliptin Phosphate (JANUVIA) 100 MG Oral Tab Take 1 tablet (100 mg total) by mouth daily.  90 tablet 1   • metFORMIN HCl  MG Oral Tablet 24 Hr Take 1 tablet (500 mg total) by mouth daily with breakfast. 90 tablet 1   • Pantoprazole Sodium 40 MG Ora month.  If no improvement she will see rheumatology.          Relevant Orders    RHEUMATOLOGY - INTERNAL       Unprioritized    Routine health maintenance    Relevant Orders    RUBEOLA(MEASLES)ANTIBODIES, IGG-IMMUNITY    HCV ANTIBODY    Controlled type 2 di

## 2019-11-26 NOTE — PATIENT INSTRUCTIONS
Carpal Tunnel Syndrome Prevention Tips  Carpal tunnel syndrome is a painful condition in the hand and wrist. It occurs when there is too much pressure on the median nerve at the wrist. The median nerve runs from your forearm to the palm of your hand.  It Slow down when you do a forceful, repetitive motion. This gives your wrist time to recover from the effort. Use power tools to help reduce the force. Strengthen the muscles  Weak muscles may lead to a poor wrist or arm position.  Exercises will make your h

## 2019-11-27 ENCOUNTER — APPOINTMENT (OUTPATIENT)
Dept: LAB | Facility: HOSPITAL | Age: 54
End: 2019-11-27
Attending: INTERNAL MEDICINE
Payer: COMMERCIAL

## 2019-11-27 DIAGNOSIS — Z12.11 SCREEN FOR COLON CANCER: ICD-10-CM

## 2019-11-27 PROCEDURE — 82274 ASSAY TEST FOR BLOOD FECAL: CPT

## 2019-12-03 ENCOUNTER — TELEPHONE (OUTPATIENT)
Dept: GASTROENTEROLOGY | Facility: CLINIC | Age: 54
End: 2019-12-03

## 2019-12-03 NOTE — TELEPHONE ENCOUNTER
We had a cancellation with Dr Erika Broussard for [facility] Dec 9, arrival 1:30pm at [facility]. Left complete message with all details on pt's voicemail, instructing to call back asap to confirm. IF ACCEPTED, CANCEL DEC 31 WITH HOANG MICHELE.         Ana Lilia Mclaughlin MD  [facility] Dr. Marisel Fisher.  Thank you for the message regarding the results from my stool sample test. The soonest I can get an appt with the physician's assistant at Dr. Yakov Bustillos' office is on Dec 31st.  [place] and hoped they could g

## 2019-12-04 NOTE — TELEPHONE ENCOUNTER
Left another complete message on both voicemails to call back asap re earlier appt. See all below. Pt informed I will not keep her in  the appt if she does not call back .  If pt calls and accepts Dec 9, please cancel Dec 31 with Amalia Dean and forward to HEMAL MORTON

## 2019-12-05 NOTE — TELEPHONE ENCOUNTER
As per 12/2 Lizzyt under Dr Job Kincaid, it does appear that pt is aware of Mon Dec 9 appt. As she is out of the country. I will keep her on for Monday. I will try again to reach her on Monday to confirm, then will cancel 12/31 with [facility].

## 2019-12-05 NOTE — TELEPHONE ENCOUNTER
Pt has never returned my calls. I left another complete voicemail on both phones, explaining that I will be taking pt out of the Dec 9 spot if I have not heard back from her by 5pm today.  She is already scheduled with Nancy MICHELE on Dec 31, and I will keep

## 2019-12-09 ENCOUNTER — OFFICE VISIT (OUTPATIENT)
Dept: GASTROENTEROLOGY | Facility: CLINIC | Age: 54
End: 2019-12-09
Payer: COMMERCIAL

## 2019-12-09 ENCOUNTER — TELEPHONE (OUTPATIENT)
Dept: GASTROENTEROLOGY | Facility: CLINIC | Age: 54
End: 2019-12-09

## 2019-12-09 VITALS
BODY MASS INDEX: 33.84 KG/M2 | SYSTOLIC BLOOD PRESSURE: 121 MMHG | WEIGHT: 191 LBS | HEART RATE: 78 BPM | DIASTOLIC BLOOD PRESSURE: 85 MMHG | HEIGHT: 63 IN

## 2019-12-09 DIAGNOSIS — K22.70 SHORT-SEGMENT BARRETT'S ESOPHAGUS: ICD-10-CM

## 2019-12-09 DIAGNOSIS — R19.5 POSITIVE FIT (FECAL IMMUNOCHEMICAL TEST): ICD-10-CM

## 2019-12-09 DIAGNOSIS — K21.9 GASTROESOPHAGEAL REFLUX DISEASE WITHOUT ESOPHAGITIS: ICD-10-CM

## 2019-12-09 DIAGNOSIS — D50.9 IRON DEFICIENCY ANEMIA, UNSPECIFIED IRON DEFICIENCY ANEMIA TYPE: Primary | ICD-10-CM

## 2019-12-09 DIAGNOSIS — D50.9 IRON DEFICIENCY ANEMIA, UNSPECIFIED IRON DEFICIENCY ANEMIA TYPE: ICD-10-CM

## 2019-12-09 DIAGNOSIS — Z86.010 HISTORY OF COLON POLYPS: ICD-10-CM

## 2019-12-09 DIAGNOSIS — Z86.010 HISTORY OF COLON POLYPS: Primary | ICD-10-CM

## 2019-12-09 PROCEDURE — 99214 OFFICE O/P EST MOD 30 MIN: CPT | Performed by: INTERNAL MEDICINE

## 2019-12-09 RX ORDER — POLYETHYLENE GLYCOL 3350, SODIUM CHLORIDE, SODIUM BICARBONATE, POTASSIUM CHLORIDE 420; 11.2; 5.72; 1.48 G/4L; G/4L; G/4L; G/4L
4 POWDER, FOR SOLUTION ORAL ONCE
Qty: 4000 ML | Refills: 0 | Status: SHIPPED | OUTPATIENT
Start: 2019-12-09 | End: 2019-12-09

## 2019-12-09 NOTE — TELEPHONE ENCOUNTER
Scheduled for:  Colonoscopy 91861 7 EGD Stan  Provider Name:   Date:  12/28/19  Location:  [facility]  Sedation:  IV  Time:  915am pt told to arrive at 815am  Prep:trilyte  Meds/Allergies Reconciled?:  Physician reviewed  Diagnosis with codes: ALLISON D50.9; HX

## 2019-12-09 NOTE — TELEPHONE ENCOUNTER
Pt informed to hold diabetic medications the evening before and the morning of the procedure. Patient verbalized message was understood and had no further questions.

## 2019-12-09 NOTE — TELEPHONE ENCOUNTER
Pt contacted this morning. She will keep appt with Dr Noel Benton today, arrival 1:30pm, given directions to [facility]. Removed appt 12/31/ with Whitney MICHELE.

## 2019-12-09 NOTE — PATIENT INSTRUCTIONS
Schedule colonoscopy and upper endoscopy for an iron deficiency anemia, positive FIT test, history of polyps and possible short segment Carrasco's esophagus following a split dose TriLyte preparation neither IV sedation or monitored anesthesia care per sche

## 2019-12-09 NOTE — TELEPHONE ENCOUNTER
,    [facility] [place] on [address]. It is in pt chart. Also pt is on [place] and metformin, shall she hold both of these day before and day of procedure?

## 2019-12-09 NOTE — TELEPHONE ENCOUNTER
Please contact [facility] and cancel the prescription. The TriLyte was sent to the patient's local pharmacy. As we discussed hold the diabetic medications the evening before and the morning of the procedure.

## 2019-12-10 NOTE — PROGRESS NOTES
HPI:    Patient ID: Nora Warren is a 62year old female. HPI  The patient returns in follow-up today at the request of Dr. Tayla Arevalo. She was last seen at endoscopy in October 2017.     As per previous notes the patient has a history of multiple nae tablet 1   • SITagliptin Phosphate (JANUVIA) 100 MG Oral Tab Take 1 tablet (100 mg total) by mouth daily.  90 tablet 1   • metFORMIN HCl  MG Oral Tablet 24 Hr Take 1 tablet (500 mg total) by mouth daily with breakfast. 90 tablet 1   • Pantoprazole Sod MCV      80.0 - 100.0 fL   88.7   MCH      26.0 - 34.0 pg   27.8   MCHC      31.0 - 37.0 g/dL   31.4   RDW-SD      35.1 - 46.3 fL   47.6 (H)   RDW      11.0 - 15.0 %   14.7   Platelet Count      298.5 - 450.0 10(3)uL   262.0   Prelim Neutrophil Abs 18.0 - 340.0 ng/mL  10.4 (L)    FOLATE (FOLIC ACID), SERUM      >=8.7 ng/mL  >20.0    OCCULT BLOOD RESULT      Negative Positive (A)       Component      Latest Ref Rng & Units 8/24/2019 4/12/2019 8/12/2018 8/8/2017   WBC      4.0 - 11.0 x10(3) uL   8.4 TOTAL PROTEIN      6.4 - 8.2 g/dL  7.8     Albumin      3.4 - 5.0 g/dL  4.0     Globulin      2.8 - 4.4 g/dL  3.8     A/G Ratio      1.0 - 2.0  1.1     MEAN PLATELET VOLUME      7.4 - 10.3 fL   9.0 9.6   Iron, Serum      50 - 170 ug/dL       Transferrin Referrals:  None       #[id number]

## 2019-12-18 DIAGNOSIS — E11.65 UNCONTROLLED TYPE 2 DIABETES MELLITUS WITH HYPERGLYCEMIA, WITHOUT LONG-TERM CURRENT USE OF INSULIN (HCC): ICD-10-CM

## 2019-12-28 ENCOUNTER — HOSPITAL ENCOUNTER (OUTPATIENT)
Facility: HOSPITAL | Age: 54
Setting detail: HOSPITAL OUTPATIENT SURGERY
Discharge: HOME | End: 2019-12-28
Attending: INTERNAL MEDICINE | Admitting: INTERNAL MEDICINE
Payer: COMMERCIAL

## 2019-12-28 VITALS
DIASTOLIC BLOOD PRESSURE: 71 MMHG | OXYGEN SATURATION: 98 % | RESPIRATION RATE: 15 BRPM | BODY MASS INDEX: 31.89 KG/M2 | HEIGHT: 63 IN | WEIGHT: 180 LBS | HEART RATE: 69 BPM | SYSTOLIC BLOOD PRESSURE: 110 MMHG

## 2019-12-28 DIAGNOSIS — K22.70 SHORT-SEGMENT BARRETT'S ESOPHAGUS: ICD-10-CM

## 2019-12-28 DIAGNOSIS — Z86.010 HISTORY OF COLON POLYPS: ICD-10-CM

## 2019-12-28 DIAGNOSIS — R19.5 POSITIVE FIT (FECAL IMMUNOCHEMICAL TEST): ICD-10-CM

## 2019-12-28 DIAGNOSIS — D50.9 IRON DEFICIENCY ANEMIA, UNSPECIFIED IRON DEFICIENCY ANEMIA TYPE: ICD-10-CM

## 2019-12-28 LAB — GLUCOSE BLDC GLUCOMTR-MCNC: 116 MG/DL (ref 70–99)

## 2019-12-28 PROCEDURE — 0DB78ZX EXCISION OF STOMACH, PYLORUS, VIA NATURAL OR ARTIFICIAL OPENING ENDOSCOPIC, DIAGNOSTIC: ICD-10-PCS | Performed by: INTERNAL MEDICINE

## 2019-12-28 PROCEDURE — 0DJD8ZZ INSPECTION OF LOWER INTESTINAL TRACT, VIA NATURAL OR ARTIFICIAL OPENING ENDOSCOPIC: ICD-10-PCS | Performed by: INTERNAL MEDICINE

## 2019-12-28 PROCEDURE — 45378 DIAGNOSTIC COLONOSCOPY: CPT | Performed by: INTERNAL MEDICINE

## 2019-12-28 PROCEDURE — 0DB68ZX EXCISION OF STOMACH, VIA NATURAL OR ARTIFICIAL OPENING ENDOSCOPIC, DIAGNOSTIC: ICD-10-PCS | Performed by: INTERNAL MEDICINE

## 2019-12-28 PROCEDURE — 0DB58ZX EXCISION OF ESOPHAGUS, VIA NATURAL OR ARTIFICIAL OPENING ENDOSCOPIC, DIAGNOSTIC: ICD-10-PCS | Performed by: INTERNAL MEDICINE

## 2019-12-28 PROCEDURE — 43239 EGD BIOPSY SINGLE/MULTIPLE: CPT | Performed by: INTERNAL MEDICINE

## 2019-12-28 PROCEDURE — 0DB98ZX EXCISION OF DUODENUM, VIA NATURAL OR ARTIFICIAL OPENING ENDOSCOPIC, DIAGNOSTIC: ICD-10-PCS | Performed by: INTERNAL MEDICINE

## 2019-12-28 PROCEDURE — G0500 MOD SEDAT ENDO SERVICE >5YRS: HCPCS | Performed by: INTERNAL MEDICINE

## 2019-12-28 RX ORDER — SODIUM CHLORIDE 0.9 % (FLUSH) 0.9 %
10 SYRINGE (ML) INJECTION AS NEEDED
Status: DISCONTINUED | OUTPATIENT
Start: 2019-12-28 | End: 2019-12-28

## 2019-12-28 RX ORDER — SODIUM CHLORIDE, SODIUM LACTATE, POTASSIUM CHLORIDE, CALCIUM CHLORIDE 600; 310; 30; 20 MG/100ML; MG/100ML; MG/100ML; MG/100ML
INJECTION, SOLUTION INTRAVENOUS CONTINUOUS
Status: DISCONTINUED | OUTPATIENT
Start: 2019-12-28 | End: 2019-12-28

## 2019-12-28 RX ORDER — MIDAZOLAM HYDROCHLORIDE 1 MG/ML
INJECTION INTRAMUSCULAR; INTRAVENOUS
Status: DISCONTINUED | OUTPATIENT
Start: 2019-12-28 | End: 2019-12-28

## 2019-12-28 RX ORDER — MIDAZOLAM HYDROCHLORIDE 1 MG/ML
1 INJECTION INTRAMUSCULAR; INTRAVENOUS EVERY 5 MIN PRN
Status: DISCONTINUED | OUTPATIENT
Start: 2019-12-28 | End: 2019-12-28

## 2019-12-28 NOTE — H&P
History & Physical Examination    Patient Name: Patricia Villarreal  MRN: [id number]  CSN: [id number]  YOB: 1965    Diagnosis: Iron deficiency anemia, history of adenomatous colon polyps, possible short segment Carrasco's esophagus      Lambert Muta SITE RIGHT (NVP=[id number])      samira 2013   • OTHER SURGICAL HISTORY      per NG: excision lession, local   • TONSILLECTOMY       Family History   Problem Relation Age of Onset   • Cancer Father         bladder   • Hypertension Father    • Cleo Monday

## 2019-12-28 NOTE — OPERATIVE REPORT
[facility] Endoscopy Report      Date of Procedure:  12/28/19      Preoperative Diagnosis:  1. Iron deficiency anemia  2. Positive FIT test  3. Personal history of adenomatous colon polyps  4.   Possible short segment Carrasco's esophagus left lateral recumbent position. Following colonoscopy, an additional 3 mg of midazolam were given and an Olympus adult HD gastroscope was inserted into the hypopharynx and advanced under direct vision into the esophagus, stomach and duodenum.   The endo duodenal bulb and post bulbar regions were normal with visible villi. #6 biopsies from the bulb and second portion were obtained. Impression:  1. Colonic diverticulosis as described above  2. Otherwise normal colonoscopy to the terminal ileum  3.

## 2019-12-30 DIAGNOSIS — E11.65 UNCONTROLLED TYPE 2 DIABETES MELLITUS WITH HYPERGLYCEMIA, WITHOUT LONG-TERM CURRENT USE OF INSULIN (HCC): ICD-10-CM

## 2019-12-30 RX ORDER — METFORMIN HYDROCHLORIDE 500 MG/1
TABLET, EXTENDED RELEASE ORAL
Qty: 180 TABLET | Refills: 1 | Status: SHIPPED | OUTPATIENT
Start: 2019-12-30 | End: 2020-01-03

## 2019-12-31 ENCOUNTER — HOSPITAL ENCOUNTER (OUTPATIENT)
Dept: MAMMOGRAPHY | Facility: HOSPITAL | Age: 54
Discharge: HOME | End: 2019-12-31
Attending: INTERNAL MEDICINE
Payer: COMMERCIAL

## 2019-12-31 DIAGNOSIS — Z12.39 BREAST CANCER SCREENING: ICD-10-CM

## 2019-12-31 PROCEDURE — 77067 SCR MAMMO BI INCL CAD: CPT | Performed by: INTERNAL MEDICINE

## 2019-12-31 PROCEDURE — 77063 BREAST TOMOSYNTHESIS BI: CPT | Performed by: INTERNAL MEDICINE

## 2019-12-31 NOTE — TELEPHONE ENCOUNTER
Refill passed per [facility], [facility] protocol.   Diabetes Medications  Protocol Criteria:  · Appointment scheduled in the past 6 months or the next 3 months  · A1C < 7.5 in the past 6 months  · Creatinine in the past 12 months  · Creatinine result < 1.5   Rece Jelly Chauhan MD    Office Visit    8 months ago Controlled type 2 diabetes mellitus without complication, without long-term current use of insulin [facility])    [facility], [address], Jana Torres MD    Office

## 2020-01-01 PROBLEM — K22.70 BARRETT'S ESOPHAGUS WITHOUT DYSPLASIA: Status: ACTIVE | Noted: 2020-01-01

## 2020-01-02 ENCOUNTER — TELEPHONE (OUTPATIENT)
Dept: GASTROENTEROLOGY | Facility: CLINIC | Age: 55
End: 2020-01-02

## 2020-01-02 DIAGNOSIS — K92.2 GASTROINTESTINAL HEMORRHAGE, UNSPECIFIED GASTROINTESTINAL HEMORRHAGE TYPE: Primary | ICD-10-CM

## 2020-01-02 DIAGNOSIS — D50.9 IRON DEFICIENCY ANEMIA, UNSPECIFIED IRON DEFICIENCY ANEMIA TYPE: ICD-10-CM

## 2020-01-02 NOTE — TELEPHONE ENCOUNTER
----- Message from Mony Teague MD sent at 12/31/2019  4:36 PM CST -----  I spoke to the patient. She is feeling well. Her duodenal biopsy was negative for sprue. Gastric biopsies were negative for H. pylori.   The polyps were fundic gland in leigh

## 2020-01-02 NOTE — TELEPHONE ENCOUNTER
Patient was informed hold the diabetic medications the evening before and the morning of the procedure. Patient verbalized message was understood and had no further questions.

## 2020-01-02 NOTE — TELEPHONE ENCOUNTER
Scheduled for:  Video Capsule [id number]  Provider Name: Dr. Kinza Islas  Date:  1/9/20  Location:  [facility]  Sedation:  None  Time:   0730 (pt is aware to arrive at 0630)   Prep:  Clear Liquids the evening before    Drink 1/2 bottle of Mag Citrate at 2000 the evening before

## 2020-01-02 NOTE — TELEPHONE ENCOUNTER
Dr. Elijah Quarles--    This patient is taking Metformin and Januvia, please advise on DM orders, thank you

## 2020-01-02 NOTE — TELEPHONE ENCOUNTER
GI RN staff: Enter EGD and colonoscopy recall is for 3 and 5 years respectively.       Both recalls entered and updated in

## 2020-01-03 ENCOUNTER — TELEPHONE (OUTPATIENT)
Dept: INTERNAL MEDICINE CLINIC | Facility: CLINIC | Age: 55
End: 2020-01-03

## 2020-01-03 DIAGNOSIS — E11.65 UNCONTROLLED TYPE 2 DIABETES MELLITUS WITH HYPERGLYCEMIA, WITHOUT LONG-TERM CURRENT USE OF INSULIN (HCC): ICD-10-CM

## 2020-01-03 RX ORDER — METFORMIN HYDROCHLORIDE 500 MG/1
500 TABLET, EXTENDED RELEASE ORAL
Qty: 90 TABLET | Refills: 1 | Status: SHIPPED | OUTPATIENT
Start: 2020-01-03 | End: 2020-04-28

## 2020-01-03 NOTE — TELEPHONE ENCOUNTER
Meeta/LAVERNE Perez Him [id number] pt reference [id number]. Rojelio Rachel states that they received two request for the metformin medication. They would like to know which is the correct dosage.

## 2020-01-03 NOTE — TELEPHONE ENCOUNTER
Per [facility] mail order, they have two prescriptions for Metformin. One written in September for 500 mg daily and one just sent on 12/30/19 for 1000 mg daily. Last office note states to continue present management.  Please clarify if patient should be taking 500

## 2020-01-07 ENCOUNTER — TELEPHONE (OUTPATIENT)
Dept: GASTROENTEROLOGY | Facility: CLINIC | Age: 55
End: 2020-01-07

## 2020-01-07 NOTE — TELEPHONE ENCOUNTER
Patient spoke with insurance and was told procedure needed a pre-determination form completed prior to 1/9/2020 capsule endoscopy. Please call. Thank you.

## 2020-01-08 NOTE — TELEPHONE ENCOUNTER
Pt Surgery/Procedure: video capsule [id number]  Pt insurance/number to contact:[phone]  Insurance ID# and [facility]:[id number]  Dx:K92.2,D50.9  JXY:[id number]  Surgeon:Dr. Bower  Procedure scheduled where:[facility]  Procedure scheduled inpt/outpt:  Date o

## 2020-01-09 ENCOUNTER — HOSPITAL ENCOUNTER (OUTPATIENT)
Facility: HOSPITAL | Age: 55
Setting detail: HOSPITAL OUTPATIENT SURGERY
Discharge: HOME | End: 2020-01-09
Attending: INTERNAL MEDICINE | Admitting: INTERNAL MEDICINE
Payer: COMMERCIAL

## 2020-01-09 VITALS
OXYGEN SATURATION: 96 % | WEIGHT: 180 LBS | HEART RATE: 73 BPM | DIASTOLIC BLOOD PRESSURE: 90 MMHG | BODY MASS INDEX: 31.89 KG/M2 | RESPIRATION RATE: 15 BRPM | HEIGHT: 63 IN | SYSTOLIC BLOOD PRESSURE: 129 MMHG

## 2020-01-09 DIAGNOSIS — D50.9 IRON DEFICIENCY ANEMIA, UNSPECIFIED IRON DEFICIENCY ANEMIA TYPE: ICD-10-CM

## 2020-01-09 DIAGNOSIS — K92.2 GASTROINTESTINAL HEMORRHAGE, UNSPECIFIED GASTROINTESTINAL HEMORRHAGE TYPE: ICD-10-CM

## 2020-01-09 LAB — GLUCOSE BLDC GLUCOMTR-MCNC: 109 MG/DL (ref 70–99)

## 2020-01-09 PROCEDURE — 91110 GI TRC IMG INTRAL ESOPH-ILE: CPT | Performed by: INTERNAL MEDICINE

## 2020-01-09 PROCEDURE — 0DJ07ZZ INSPECTION OF UPPER INTESTINAL TRACT, VIA NATURAL OR ARTIFICIAL OPENING: ICD-10-PCS | Performed by: INTERNAL MEDICINE

## 2020-01-10 NOTE — H&P
History & Physical Examination    Patient Name: Jaspreet Saunders  MRN: [id number]  CSN: [id number]  YOB: 1965    Diagnosis: Iron deficiency anemia and negative upper and lower endoscopy      No medications prior to admission.     No current f risks and benefits and alternatives with the patient/family. They understand and agree to proceed with plan of care. [ x ] I have reviewed the History and Physical done within the last 30 days. Any changes noted above.     Renay Kent MD  1/10/2

## 2020-01-16 ENCOUNTER — TELEPHONE (OUTPATIENT)
Dept: GASTROENTEROLOGY | Facility: CLINIC | Age: 55
End: 2020-01-16

## 2020-01-16 DIAGNOSIS — D50.0 IRON DEFICIENCY ANEMIA DUE TO CHRONIC BLOOD LOSS: Primary | ICD-10-CM

## 2020-01-16 NOTE — TELEPHONE ENCOUNTER
I spoke to the patient. 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 I have discussed the significance. I have recommended that she begin ferrous sulfate 1 tablet every other day.   She was cautioned regarding black

## 2020-01-16 NOTE — OPERATIVE REPORT
[facility] Endoscopy Report      Date of Procedure:  01/9/20        Preoperative Diagnosis:  Iron deficiency anemia and negative upper and lower endoscopy      Postoperative Diagnosis:  Nonbleeding ileal vascular malformations      Procedur

## 2020-01-28 ENCOUNTER — OFFICE VISIT (OUTPATIENT)
Dept: INTERNAL MEDICINE CLINIC | Facility: CLINIC | Age: 55
End: 2020-01-28
Payer: COMMERCIAL

## 2020-01-28 VITALS
WEIGHT: 191.38 LBS | DIASTOLIC BLOOD PRESSURE: 84 MMHG | BODY MASS INDEX: 33.91 KG/M2 | SYSTOLIC BLOOD PRESSURE: 131 MMHG | HEIGHT: 63 IN | HEART RATE: 71 BPM

## 2020-01-28 DIAGNOSIS — D64.9 ANEMIA, UNSPECIFIED TYPE: ICD-10-CM

## 2020-01-28 DIAGNOSIS — Z00.00 ROUTINE HEALTH MAINTENANCE: ICD-10-CM

## 2020-01-28 DIAGNOSIS — G56.03 BILATERAL CARPAL TUNNEL SYNDROME: ICD-10-CM

## 2020-01-28 DIAGNOSIS — E11.9 CONTROLLED TYPE 2 DIABETES MELLITUS WITHOUT COMPLICATION, WITHOUT LONG-TERM CURRENT USE OF INSULIN (HCC): Primary | ICD-10-CM

## 2020-01-28 PROCEDURE — 99214 OFFICE O/P EST MOD 30 MIN: CPT | Performed by: INTERNAL MEDICINE

## 2020-01-28 RX ORDER — FERROUS SULFATE 325(65) MG
TABLET ORAL
COMMUNITY
Start: 2020-01-18 | End: 2021-04-28 | Stop reason: ALTCHOICE

## 2020-01-28 NOTE — PATIENT INSTRUCTIONS
Please schedule your next appointment in late April. Do fasting labs 2-4 days before that appointment. Fast for 12 hours. Water and meds are okay. Walk in.

## 2020-01-28 NOTE — PROGRESS NOTES
HPI:    Patient ID: Shiva Johnson is a 60year old female. Pt saw Dr. Elizabeth Son and had EGD, colon and video endoscopy. 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 She was started on iron for 6 weeks.   She has been wearing the wrist splints ever Dispense Refill   • Ferrous Sulfate 325 (65 Fe) MG Oral Tab      • SITagliptin Phosphate (JANUVIA) 100 MG Oral Tab Take 1 tablet (100 mg total) by mouth daily.  90 tablet 1   • metFORMIN HCl  MG Oral Tablet 24 Hr Take 1 tablet (500 mg total) by mouth Items Addressed This Visit        High    Controlled type 2 diabetes mellitus without complication, without long-term current use of insulin ([facility] [address] - Primary     The patient's diabetes is well controlled. Her last A1c was 6.9.   She has started an exercise

## 2020-01-28 NOTE — ASSESSMENT & PLAN NOTE
The patient's diabetes is well controlled. Her last A1c was 6.9. She has started an exercise program.  She has an appointment with her eye doctor for May. She will do her complete fasting blood test in late April.

## 2020-01-28 NOTE — ASSESSMENT & PLAN NOTE
Patient had a panendoscopy which revealed 2 small AVMs. She is taking iron and will follow-up with Dr. Maxime Yip.

## 2020-03-02 ENCOUNTER — TELEPHONE (OUTPATIENT)
Dept: GASTROENTEROLOGY | Facility: CLINIC | Age: 55
End: 2020-03-02

## 2020-03-02 ENCOUNTER — LAB ENCOUNTER (OUTPATIENT)
Dept: LAB | Age: 55
End: 2020-03-02
Attending: INTERNAL MEDICINE
Payer: COMMERCIAL

## 2020-03-02 DIAGNOSIS — D50.0 IRON DEFICIENCY ANEMIA DUE TO CHRONIC BLOOD LOSS: ICD-10-CM

## 2020-03-02 LAB
BASOPHILS # BLD AUTO: 0.03 X10(3) UL (ref 0–0.2)
BASOPHILS NFR BLD AUTO: 0.3 %
DEPRECATED HBV CORE AB SER IA-ACNC: 23.4 NG/ML (ref 18–340)
DEPRECATED RDW RBC AUTO: 46 FL (ref 35.1–46.3)
EOSINOPHIL # BLD AUTO: 0.27 X10(3) UL (ref 0–0.7)
EOSINOPHIL NFR BLD AUTO: 2.7 %
ERYTHROCYTE [DISTWIDTH] IN BLOOD BY AUTOMATED COUNT: 14.3 % (ref 11–15)
HCT VFR BLD AUTO: 35.6 % (ref 35–48)
HGB BLD-MCNC: 11.3 G/DL (ref 12–16)
IMM GRANULOCYTES # BLD AUTO: 0.04 X10(3) UL (ref 0–1)
IMM GRANULOCYTES NFR BLD: 0.4 %
IRON SATURATION: 11 % (ref 15–50)
IRON SERPL-MCNC: 46 UG/DL (ref 50–170)
LYMPHOCYTES # BLD AUTO: 3.22 X10(3) UL (ref 1–4)
LYMPHOCYTES NFR BLD AUTO: 32.7 %
MCH RBC QN AUTO: 27.9 PG (ref 26–34)
MCHC RBC AUTO-ENTMCNC: 31.7 G/DL (ref 31–37)
MCV RBC AUTO: 87.9 FL (ref 80–100)
MONOCYTES # BLD AUTO: 0.72 X10(3) UL (ref 0.1–1)
MONOCYTES NFR BLD AUTO: 7.3 %
NEUTROPHILS # BLD AUTO: 5.57 X10 (3) UL (ref 1.5–7.7)
NEUTROPHILS # BLD AUTO: 5.57 X10(3) UL (ref 1.5–7.7)
NEUTROPHILS NFR BLD AUTO: 56.6 %
PLATELET # BLD AUTO: 264 10(3)UL (ref 150–450)
RBC # BLD AUTO: 4.05 X10(6)UL (ref 3.8–5.3)
TOTAL IRON BINDING CAPACITY: 423 UG/DL (ref 240–450)
TRANSFERRIN SERPL-MCNC: 284 MG/DL (ref 200–360)
WBC # BLD AUTO: 9.9 X10(3) UL (ref 4–11)

## 2020-03-02 PROCEDURE — 84466 ASSAY OF TRANSFERRIN: CPT

## 2020-03-02 PROCEDURE — 83540 ASSAY OF IRON: CPT

## 2020-03-02 PROCEDURE — 85025 COMPLETE CBC W/AUTO DIFF WBC: CPT

## 2020-03-02 PROCEDURE — 82728 ASSAY OF FERRITIN: CPT

## 2020-03-02 PROCEDURE — 36415 COLL VENOUS BLD VENIPUNCTURE: CPT

## 2020-03-03 DIAGNOSIS — D50.0 IRON DEFICIENCY ANEMIA DUE TO CHRONIC BLOOD LOSS: Primary | ICD-10-CM

## 2020-04-21 RX ORDER — FAMOTIDINE 20 MG/1
TABLET ORAL
Qty: 180 TABLET | Refills: 1 | Status: SHIPPED | OUTPATIENT
Start: 2020-04-21 | End: 2020-10-31

## 2020-04-23 ENCOUNTER — LAB ENCOUNTER (OUTPATIENT)
Dept: LAB | Facility: HOSPITAL | Age: 55
End: 2020-04-23
Attending: INTERNAL MEDICINE
Payer: COMMERCIAL

## 2020-04-23 DIAGNOSIS — D50.0 IRON DEFICIENCY ANEMIA DUE TO CHRONIC BLOOD LOSS: ICD-10-CM

## 2020-04-23 DIAGNOSIS — E61.1 IRON DEFICIENCY: Primary | ICD-10-CM

## 2020-04-23 DIAGNOSIS — Z00.00 ROUTINE HEALTH MAINTENANCE: ICD-10-CM

## 2020-04-23 DIAGNOSIS — E11.9 CONTROLLED TYPE 2 DIABETES MELLITUS WITHOUT COMPLICATION, WITHOUT LONG-TERM CURRENT USE OF INSULIN (HCC): ICD-10-CM

## 2020-04-23 PROCEDURE — 82728 ASSAY OF FERRITIN: CPT

## 2020-04-23 PROCEDURE — 80053 COMPREHEN METABOLIC PANEL: CPT

## 2020-04-23 PROCEDURE — 80061 LIPID PANEL: CPT

## 2020-04-23 PROCEDURE — 85025 COMPLETE CBC W/AUTO DIFF WBC: CPT

## 2020-04-23 PROCEDURE — 84466 ASSAY OF TRANSFERRIN: CPT

## 2020-04-23 PROCEDURE — 82043 UR ALBUMIN QUANTITATIVE: CPT

## 2020-04-23 PROCEDURE — 83036 HEMOGLOBIN GLYCOSYLATED A1C: CPT

## 2020-04-23 PROCEDURE — 83540 ASSAY OF IRON: CPT

## 2020-04-23 PROCEDURE — 82570 ASSAY OF URINE CREATININE: CPT

## 2020-04-23 PROCEDURE — 84443 ASSAY THYROID STIM HORMONE: CPT

## 2020-04-23 PROCEDURE — 36415 COLL VENOUS BLD VENIPUNCTURE: CPT

## 2020-04-28 ENCOUNTER — TELEMEDICINE (OUTPATIENT)
Dept: INTERNAL MEDICINE CLINIC | Facility: CLINIC | Age: 55
End: 2020-04-28

## 2020-04-28 VITALS — HEIGHT: 63 IN | BODY MASS INDEX: 32.78 KG/M2 | WEIGHT: 185 LBS

## 2020-04-28 DIAGNOSIS — E78.00 HYPERCHOLESTEROLEMIA: ICD-10-CM

## 2020-04-28 DIAGNOSIS — E11.9 CONTROLLED TYPE 2 DIABETES MELLITUS WITHOUT COMPLICATION, WITHOUT LONG-TERM CURRENT USE OF INSULIN (HCC): Primary | ICD-10-CM

## 2020-04-28 DIAGNOSIS — K22.70 BARRETT'S ESOPHAGUS WITHOUT DYSPLASIA: ICD-10-CM

## 2020-04-28 DIAGNOSIS — D64.9 ANEMIA, UNSPECIFIED TYPE: ICD-10-CM

## 2020-04-28 PROCEDURE — 99214 OFFICE O/P EST MOD 30 MIN: CPT | Performed by: INTERNAL MEDICINE

## 2020-04-28 RX ORDER — METFORMIN HYDROCHLORIDE 500 MG/1
500 TABLET, EXTENDED RELEASE ORAL
Qty: 90 TABLET | Refills: 1 | Status: SHIPPED | OUTPATIENT
Start: 2020-04-28 | End: 2020-07-11

## 2020-04-28 NOTE — ASSESSMENT & PLAN NOTE
Patient's A1c was 6.8. She has an appointment with the eye doctor.   Continue current medications, diet, and exercise program.  She has a mild decrease in her GFR just this last blood test.  We will recheck it with the next blood test.  She does not take N

## 2020-04-28 NOTE — ASSESSMENT & PLAN NOTE
Patient symptoms are controlled with pantoprazole once a day and famotidine 20 mg twice a day.   She will follow-up with the gastroenterologist.

## 2020-04-28 NOTE — PROGRESS NOTES
Video Progress Note      HPI:    Patient ID: Reyes Ishikawa is a 60year old female. Pt is doing okay. She had her blood drawn and her A1C was 6.8. She has been exercising. She is not anxious despite the stay at home order and the coronavirus.   Sh Medications   Medication Sig Dispense Refill   • metFORMIN HCl  MG Oral Tablet 24 Hr Take 1 tablet (500 mg total) by mouth daily with breakfast. 90 tablet 1   • SITagliptin Phosphate (JANUVIA) 100 MG Oral Tab Take 1 tablet (100 mg total) by mouth jose has a normal mood and affect.  Thought content normal.              ASSESSMENT/PLAN:     Problem List Items Addressed This Visit        High    Controlled type 2 diabetes mellitus without complication, without long-term current use of insulin ([facility] [address] - Primar restrictions of visitation. There are limitations of this visit as no physical exam could be performed. Every conscious effort was taken to allow for sufficient and adequate time.   This billing was spent on reviewing labs, medications, radiology tests an

## 2020-04-28 NOTE — ASSESSMENT & PLAN NOTE
NOTIFICATION RETURN TO WORK / SCHOOL 
 
8/21/2019 5:36 PM 
 
Ms. Kana Jiang [address] [address] To Whom It May Concern: 
 
Kana Jiang is currently under the care of [address] [facility]. She will return to work/school on: 8/22/19 If there are questions or concerns please have the patient contact our office. Sincerely, Carol Bell MD 
 
                                
 
 Results reviewed with pt. Cholesterol is good.   CPM.

## 2020-04-28 NOTE — ASSESSMENT & PLAN NOTE
Patient's anemia has resolved.   She has continued to take iron twice a day per the gastroenterologist.

## 2020-04-28 NOTE — PATIENT INSTRUCTIONS
Do non-fasting labs 2-3 days prior to the next appointment. Please schedule that appointment for a physical for after August 27th.

## 2020-05-07 DIAGNOSIS — E78.00 HYPERCHOLESTEROLEMIA: ICD-10-CM

## 2020-05-07 RX ORDER — ATORVASTATIN CALCIUM 10 MG/1
TABLET, FILM COATED ORAL
Qty: 90 TABLET | Refills: 1 | Status: SHIPPED | OUTPATIENT
Start: 2020-05-07 | End: 2020-10-31

## 2020-05-19 ENCOUNTER — E-VISIT (OUTPATIENT)
Dept: FAMILY MEDICINE CLINIC | Facility: CLINIC | Age: 55
End: 2020-05-19

## 2020-05-19 DIAGNOSIS — Z20.822 SUSPECTED COVID-19 VIRUS INFECTION: Primary | ICD-10-CM

## 2020-05-19 NOTE — PROGRESS NOTES
Rocio Hernandez is a 60year old female. HPI:   See answers to questions above.      Current Outpatient Medications   Medication Sig Dispense Refill   • ATORVASTATIN 10 MG Oral Tab TAKE 1 TABLET DAILY 90 tablet 1   • metFORMIN HCl  MG Oral Tablet Tobacco comment: quit 40 years ago     Alcohol use:  Yes      Alcohol/week: 0.0 standard drinks      Frequency: 2-4 times a month      Drinks per session: 1 or 2      Comment: (2drinks) weekly/ social    Drug use: No        ASSESSMENT AND PLAN:     Emilia Roth

## 2020-05-19 NOTE — PATIENT INSTRUCTIONS
Coronavirus Disease 2019 (COVID-19)     [facility] is committed to the safety and well-being of our patients, members, employees, and communities.  As concerns arise about the new strain of coronavirus that causes COVID-19, [facility] 9. Avoid sharing personal items with other people in your household, like dishes, towels, and bedding   10. Clean all surfaces that are touched often, like counters, tabletops, and doorknobs.  Use household cleaning sprays or wipes according to the label in If you have a fever with cough or shortness of breath but have not been exposed to someone with COVID-19 and have not tested positive for COVID-19, you should also stay home and away from others for a total of 10 days after your symptoms started, OR until

## 2020-05-20 ENCOUNTER — LAB ENCOUNTER (OUTPATIENT)
Dept: LAB | Facility: HOSPITAL | Age: 55
End: 2020-05-20
Attending: NURSE PRACTITIONER
Payer: COMMERCIAL

## 2020-05-20 DIAGNOSIS — Z20.822 SUSPECTED COVID-19 VIRUS INFECTION: ICD-10-CM

## 2020-07-09 DIAGNOSIS — K21.9 GASTROESOPHAGEAL REFLUX DISEASE, ESOPHAGITIS PRESENCE NOT SPECIFIED: ICD-10-CM

## 2020-07-09 RX ORDER — PANTOPRAZOLE SODIUM 40 MG/1
TABLET, DELAYED RELEASE ORAL
Qty: 90 TABLET | Refills: 0 | Status: SHIPPED | OUTPATIENT
Start: 2020-07-09 | End: 2020-10-07

## 2020-07-11 ENCOUNTER — TELEPHONE (OUTPATIENT)
Dept: INTERNAL MEDICINE CLINIC | Facility: CLINIC | Age: 55
End: 2020-07-11

## 2020-07-11 RX ORDER — METFORMIN HYDROCHLORIDE 500 MG/1
500 TABLET, EXTENDED RELEASE ORAL
Qty: 90 TABLET | Refills: 0 | Status: SHIPPED | OUTPATIENT
Start: 2020-07-11 | End: 2021-04-30

## 2020-07-11 NOTE — TELEPHONE ENCOUNTER
Please call pt. Some of the metformin lots have been recalled due to a small amount of contaminant with a probable carcinogen. This contaminant is also found in water and foods, including meats, dairy products and vegetables.   It was only found in 7 lots

## 2020-07-13 NOTE — TELEPHONE ENCOUNTER
Spoke with patient ( verified) and relayed Dr. Андрей Worley message below--patient verbalizes understanding and agreement. No further questions/concerns at this time.

## 2020-08-10 ENCOUNTER — PATIENT MESSAGE (OUTPATIENT)
Dept: GASTROENTEROLOGY | Facility: CLINIC | Age: 55
End: 2020-08-10

## 2020-08-10 NOTE — TELEPHONE ENCOUNTER
From: Kiley Suarez  To: Villa Vazquez MD  Sent: 8/10/2020 3:05 PM CDT  Subject: Visit Follow-up Question    Good afternoon Dr. Jony Garcia . ...      Based on our last phone conversation, I believe you wanted me to have follow up blood work done at th

## 2020-08-20 ENCOUNTER — LAB ENCOUNTER (OUTPATIENT)
Dept: LAB | Age: 55
End: 2020-08-20
Attending: INTERNAL MEDICINE
Payer: COMMERCIAL

## 2020-08-20 DIAGNOSIS — E11.9 CONTROLLED TYPE 2 DIABETES MELLITUS WITHOUT COMPLICATION, WITHOUT LONG-TERM CURRENT USE OF INSULIN (HCC): ICD-10-CM

## 2020-08-20 LAB
ANION GAP SERPL CALC-SCNC: 5 MMOL/L (ref 0–18)
BUN BLD-MCNC: 12 MG/DL (ref 7–18)
BUN/CREAT SERPL: 9.4 (ref 10–20)
CALCIUM BLD-MCNC: 9.6 MG/DL (ref 8.5–10.1)
CHLORIDE SERPL-SCNC: 108 MMOL/L (ref 98–112)
CO2 SERPL-SCNC: 28 MMOL/L (ref 21–32)
CREAT BLD-MCNC: 1.27 MG/DL (ref 0.55–1.02)
EST. AVERAGE GLUCOSE BLD GHB EST-MCNC: 140 MG/DL (ref 68–126)
GLUCOSE BLD-MCNC: 123 MG/DL (ref 70–99)
HBA1C MFR BLD HPLC: 6.5 % (ref ?–5.7)
OSMOLALITY SERPL CALC.SUM OF ELEC: 293 MOSM/KG (ref 275–295)
PATIENT FASTING Y/N/NP: YES
POTASSIUM SERPL-SCNC: 4.1 MMOL/L (ref 3.5–5.1)
SODIUM SERPL-SCNC: 141 MMOL/L (ref 136–145)

## 2020-08-20 PROCEDURE — 80048 BASIC METABOLIC PNL TOTAL CA: CPT

## 2020-08-20 PROCEDURE — 36415 COLL VENOUS BLD VENIPUNCTURE: CPT

## 2020-08-20 PROCEDURE — 83036 HEMOGLOBIN GLYCOSYLATED A1C: CPT

## 2020-08-31 ENCOUNTER — OFFICE VISIT (OUTPATIENT)
Dept: INTERNAL MEDICINE CLINIC | Facility: CLINIC | Age: 55
End: 2020-08-31
Payer: COMMERCIAL

## 2020-08-31 VITALS
HEIGHT: 63 IN | WEIGHT: 190 LBS | BODY MASS INDEX: 33.66 KG/M2 | SYSTOLIC BLOOD PRESSURE: 131 MMHG | DIASTOLIC BLOOD PRESSURE: 80 MMHG | HEART RATE: 81 BPM

## 2020-08-31 DIAGNOSIS — E78.00 HYPERCHOLESTEROLEMIA: ICD-10-CM

## 2020-08-31 DIAGNOSIS — Z00.00 ROUTINE HEALTH MAINTENANCE: Primary | ICD-10-CM

## 2020-08-31 DIAGNOSIS — N18.30 CONTROLLED TYPE 2 DIABETES MELLITUS WITH STAGE 3 CHRONIC KIDNEY DISEASE, WITHOUT LONG-TERM CURRENT USE OF INSULIN (HCC): ICD-10-CM

## 2020-08-31 DIAGNOSIS — H91.90 HEARING LOSS, UNSPECIFIED HEARING LOSS TYPE, UNSPECIFIED LATERALITY: ICD-10-CM

## 2020-08-31 DIAGNOSIS — N64.4 BREAST PAIN, RIGHT: ICD-10-CM

## 2020-08-31 DIAGNOSIS — M72.2 PLANTAR FASCIITIS: ICD-10-CM

## 2020-08-31 DIAGNOSIS — R07.81 RIB PAIN ON RIGHT SIDE: ICD-10-CM

## 2020-08-31 DIAGNOSIS — E11.22 CONTROLLED TYPE 2 DIABETES MELLITUS WITH STAGE 3 CHRONIC KIDNEY DISEASE, WITHOUT LONG-TERM CURRENT USE OF INSULIN (HCC): ICD-10-CM

## 2020-08-31 PROCEDURE — 99214 OFFICE O/P EST MOD 30 MIN: CPT | Performed by: INTERNAL MEDICINE

## 2020-08-31 PROCEDURE — 3008F BODY MASS INDEX DOCD: CPT | Performed by: INTERNAL MEDICINE

## 2020-08-31 PROCEDURE — 3075F SYST BP GE 130 - 139MM HG: CPT | Performed by: INTERNAL MEDICINE

## 2020-08-31 PROCEDURE — 3079F DIAST BP 80-89 MM HG: CPT | Performed by: INTERNAL MEDICINE

## 2020-08-31 PROCEDURE — 99396 PREV VISIT EST AGE 40-64: CPT | Performed by: INTERNAL MEDICINE

## 2020-08-31 NOTE — PATIENT INSTRUCTIONS
Dr. Analy Hoffman (dermatologist):  [phone]    Dr. Concepcion Burgos, the dermatologist  [phone]. Please schedule x-rays and blood tests. You do not need to fast.   Lab and x-ray no longer accepts walk-ins.   Please call central registration at 76 orthopedist.  · Premade or custom-made night splints keep the heel stretched out while you sleep. They may prevent morning pain.   · Limit activities that stress the feet: jogging, prolonged standing or walking, contact sports, etc.  · First thing in the mo

## 2020-08-31 NOTE — PROGRESS NOTES
HPI:    Patient ID: Robert Ariza is a 60year old female. Pt is here for a physical and to discuss some problems. She has pain under her right breast on her ribs. It hurts when she lays on it for the past 6 months. No change. Moderate pain.  Arun Del Angel Nimesh Osorio MD at [address] [place] [address] ENDOSCOPY   • ESOPHAGOGASTRODUODENOSCOPY (EGD) N/A 10/31/2017    Performed by Nimesh Osorio MD at [address] [place] [address] ENDOSCOPY   • KEVIN BIOPSY STEREO NODULE 1 SITE RIGHT ([id number])      samira 2013   • OTHER SURGICAL HISTORY Cardiovascular: Negative for chest pain and palpitations. Gastrointestinal: Negative for nausea, vomiting, abdominal pain and blood in stool. Endocrine: Negative for cold intolerance and heat intolerance.    Genitourinary: Negative for dysuria and hem left labia. Uterus is not tender. Cervix exhibits no motion tenderness. Right adnexum displays no tenderness. Left adnexum displays no tenderness. No signs of injury in the vagina. Lymphadenopathy:     She has no cervical adenopathy.    Neurological: patient expressed understanding and agreed with the plan.     Meds This Visit:  Requested Prescriptions      No prescriptions requested or ordered in this encounter

## 2020-09-01 NOTE — ASSESSMENT & PLAN NOTE
Patient has rib pain. We will check x-rays. I advised her that if this does not improve she can go for physical therapy. I do not recommend NSAIDs because of her chronic kidney disease.

## 2020-09-01 NOTE — ASSESSMENT & PLAN NOTE
Advised patient to put heel pads into her shoes. Rafia written information given to pt. Advised stretching.

## 2020-09-01 NOTE — ASSESSMENT & PLAN NOTE
Patient's A1c was 6.5 which is excellent. She is up-to-date on her eye exam.  She does not have any neuropathy or retinopathy. She does have nephropathy. Follow-up in 3 months.

## 2020-09-01 NOTE — ASSESSMENT & PLAN NOTE
Patient's GFR has decreased a bit. This may be due to her diabetes. I advised the patient to drink more water.

## 2020-09-04 ENCOUNTER — HOSPITAL ENCOUNTER (OUTPATIENT)
Dept: GENERAL RADIOLOGY | Facility: HOSPITAL | Age: 55
Discharge: HOME | End: 2020-09-04
Attending: INTERNAL MEDICINE
Payer: COMMERCIAL

## 2020-09-04 ENCOUNTER — OFFICE VISIT (OUTPATIENT)
Dept: AUDIOLOGY | Facility: CLINIC | Age: 55
End: 2020-09-04
Payer: COMMERCIAL

## 2020-09-04 ENCOUNTER — OFFICE VISIT (OUTPATIENT)
Dept: OTOLARYNGOLOGY | Facility: CLINIC | Age: 55
End: 2020-09-04
Payer: COMMERCIAL

## 2020-09-04 VITALS
HEIGHT: 62 IN | WEIGHT: 190 LBS | DIASTOLIC BLOOD PRESSURE: 80 MMHG | BODY MASS INDEX: 34.96 KG/M2 | HEART RATE: 71 BPM | SYSTOLIC BLOOD PRESSURE: 119 MMHG

## 2020-09-04 DIAGNOSIS — H69.83 DYSFUNCTION OF BOTH EUSTACHIAN TUBES: Primary | ICD-10-CM

## 2020-09-04 DIAGNOSIS — R07.81 RIB PAIN ON RIGHT SIDE: ICD-10-CM

## 2020-09-04 DIAGNOSIS — H90.3 SENSORINEURAL HEARING LOSS, BILATERAL: Primary | ICD-10-CM

## 2020-09-04 PROCEDURE — 99243 OFF/OP CNSLTJ NEW/EST LOW 30: CPT | Performed by: OTOLARYNGOLOGY

## 2020-09-04 PROCEDURE — 71101 X-RAY EXAM UNILAT RIBS/CHEST: CPT | Performed by: INTERNAL MEDICINE

## 2020-09-04 PROCEDURE — 92557 COMPREHENSIVE HEARING TEST: CPT | Performed by: AUDIOLOGIST

## 2020-09-04 PROCEDURE — 3008F BODY MASS INDEX DOCD: CPT | Performed by: OTOLARYNGOLOGY

## 2020-09-04 PROCEDURE — 3079F DIAST BP 80-89 MM HG: CPT | Performed by: OTOLARYNGOLOGY

## 2020-09-04 PROCEDURE — 92567 TYMPANOMETRY: CPT | Performed by: AUDIOLOGIST

## 2020-09-04 PROCEDURE — 3074F SYST BP LT 130 MM HG: CPT | Performed by: OTOLARYNGOLOGY

## 2020-09-04 RX ORDER — FLUTICASONE PROPIONATE 50 MCG
2 SPRAY, SUSPENSION (ML) NASAL DAILY
Qty: 1 BOTTLE | Refills: 3 | Status: SHIPPED | OUTPATIENT
Start: 2020-09-04 | End: 2021-07-20

## 2020-09-04 RX ORDER — FLUTICASONE PROPIONATE 50 MCG
2 SPRAY, SUSPENSION (ML) NASAL DAILY
Qty: 1 BOTTLE | Refills: 3 | Status: SHIPPED | OUTPATIENT
Start: 2020-09-04 | End: 2020-12-03

## 2020-09-04 NOTE — PROGRESS NOTES
Patricia Villarreal is a 60year old female. Patient presents with:  Hearing Loss: per pt noticing hearing loss ,   Ear Wax    HPI:   She has been experiencing problems with fullness and blockage in her ears.   There is been no pain she feels that her hearing Head/Face Normal Facial features - Normal. Eyebrows - Normal. Skull - Normal.   Oral/Oropharynx Normal Lips - Normal, Tonsils - Normal, Tongue - Normal    Nasal Normal External nose - Normal. Nasal septum - Normal, Turbinates - Normal   Neurological Norm

## 2020-09-08 NOTE — PROGRESS NOTES
AUDIOLOGY REPORT      Westley Green is a 60year old female     Referring Provider: Jn    YOB: 1965  Medical Record: [id number]      Patient Hearing History:  Patient reported a question of hearing loss.        Otoscopic Inspect

## 2020-09-16 ENCOUNTER — HOSPITAL ENCOUNTER (OUTPATIENT)
Dept: GENERAL RADIOLOGY | Facility: HOSPITAL | Age: 55
Discharge: HOME | End: 2020-09-16
Attending: INTERNAL MEDICINE
Payer: COMMERCIAL

## 2020-09-16 ENCOUNTER — HOSPITAL ENCOUNTER (OUTPATIENT)
Dept: ULTRASOUND IMAGING | Facility: HOSPITAL | Age: 55
Discharge: HOME | End: 2020-09-16
Attending: INTERNAL MEDICINE
Payer: COMMERCIAL

## 2020-09-16 ENCOUNTER — HOSPITAL ENCOUNTER (OUTPATIENT)
Dept: MAMMOGRAPHY | Facility: HOSPITAL | Age: 55
Discharge: HOME | End: 2020-09-16
Attending: INTERNAL MEDICINE
Payer: COMMERCIAL

## 2020-09-16 DIAGNOSIS — N64.4 BREAST PAIN, RIGHT: ICD-10-CM

## 2020-09-16 DIAGNOSIS — R93.89 ABNORMAL X-RAY: ICD-10-CM

## 2020-09-16 PROCEDURE — 74018 RADEX ABDOMEN 1 VIEW: CPT | Performed by: INTERNAL MEDICINE

## 2020-09-16 PROCEDURE — 76642 ULTRASOUND BREAST LIMITED: CPT | Performed by: INTERNAL MEDICINE

## 2020-09-16 PROCEDURE — 77066 DX MAMMO INCL CAD BI: CPT | Performed by: INTERNAL MEDICINE

## 2020-09-16 PROCEDURE — 77062 BREAST TOMOSYNTHESIS BI: CPT | Performed by: INTERNAL MEDICINE

## 2020-10-07 DIAGNOSIS — K21.9 GASTROESOPHAGEAL REFLUX DISEASE: ICD-10-CM

## 2020-10-07 RX ORDER — PANTOPRAZOLE SODIUM 40 MG/1
40 TABLET, DELAYED RELEASE ORAL EVERY MORNING
Qty: 90 TABLET | Refills: 1 | Status: SHIPPED | OUTPATIENT
Start: 2020-10-07 | End: 2020-12-03

## 2020-10-20 ENCOUNTER — LAB ENCOUNTER (OUTPATIENT)
Dept: LAB | Age: 55
End: 2020-10-20
Attending: INTERNAL MEDICINE
Payer: COMMERCIAL

## 2020-10-20 DIAGNOSIS — E61.1 IRON DEFICIENCY: ICD-10-CM

## 2020-10-20 DIAGNOSIS — E11.22 CONTROLLED TYPE 2 DIABETES MELLITUS WITH STAGE 3 CHRONIC KIDNEY DISEASE, WITHOUT LONG-TERM CURRENT USE OF INSULIN (HCC): ICD-10-CM

## 2020-10-20 DIAGNOSIS — E11.9 CONTROLLED TYPE 2 DIABETES MELLITUS WITHOUT COMPLICATION, WITHOUT LONG-TERM CURRENT USE OF INSULIN (HCC): ICD-10-CM

## 2020-10-20 DIAGNOSIS — N18.30 CONTROLLED TYPE 2 DIABETES MELLITUS WITH STAGE 3 CHRONIC KIDNEY DISEASE, WITHOUT LONG-TERM CURRENT USE OF INSULIN (HCC): ICD-10-CM

## 2020-10-20 PROCEDURE — 80048 BASIC METABOLIC PNL TOTAL CA: CPT

## 2020-10-20 PROCEDURE — 36415 COLL VENOUS BLD VENIPUNCTURE: CPT

## 2020-10-20 PROCEDURE — 83540 ASSAY OF IRON: CPT

## 2020-10-20 PROCEDURE — 83036 HEMOGLOBIN GLYCOSYLATED A1C: CPT

## 2020-10-20 PROCEDURE — 85025 COMPLETE CBC W/AUTO DIFF WBC: CPT

## 2020-10-20 PROCEDURE — 84466 ASSAY OF TRANSFERRIN: CPT

## 2020-10-20 PROCEDURE — 82728 ASSAY OF FERRITIN: CPT

## 2020-10-28 ENCOUNTER — PATIENT MESSAGE (OUTPATIENT)
Dept: INTERNAL MEDICINE CLINIC | Facility: CLINIC | Age: 55
End: 2020-10-28

## 2020-10-29 ENCOUNTER — TELEMEDICINE (OUTPATIENT)
Dept: INTERNAL MEDICINE CLINIC | Facility: CLINIC | Age: 55
End: 2020-10-29
Payer: COMMERCIAL

## 2020-10-29 ENCOUNTER — TELEPHONE (OUTPATIENT)
Dept: INTERNAL MEDICINE CLINIC | Facility: CLINIC | Age: 55
End: 2020-10-29

## 2020-10-29 DIAGNOSIS — Z20.822 EXPOSURE TO COVID-19 VIRUS: Primary | ICD-10-CM

## 2020-10-29 PROCEDURE — 99213 OFFICE O/P EST LOW 20 MIN: CPT | Performed by: INTERNAL MEDICINE

## 2020-10-29 NOTE — TELEPHONE ENCOUNTER
From: Angelito Bledsoe  To: Viola Matute MD  Sent: 10/28/2020 4:37 PM CDT  Subject: Other    Good afternoon Dr. Meaghan Flores. I am reaching out to ask if you could please order a COVID-19 test for me.  I was with my family on Sunday and came in contact with rosalba

## 2020-10-29 NOTE — TELEPHONE ENCOUNTER
Noted.  Thank you.
Please schedule Doximity today.
Talked to patient appointment made.
Triage, please contact patient.   ----- Message from Alia Lindsay Leung. Sruthi Mondragon sent at 10/28/2020  4:37 PM CDT -----  Regarding: Other  Contact: [phone]  Good afternoon Dr. Matthias Dubois. I am reaching out to ask if you could please order a COVID-19 test for me.
Full range of motion of upper and lower extremities, no joint tenderness/swelling.

## 2020-10-29 NOTE — PROGRESS NOTES
Video Progress Note  Telehealth Verbal Consent   I conducted a telehealth visit with Nora Warren today, 10/29/20, which was completed using two-way, real-time interactive audio and video communication.  This has been done in good mario to provide cont • ADENOIDECTOMY     • CAPSULE ENDOSCOPY N/A 1/9/2020    Performed by Alexys Meneses MD at [address] [place] [address] ENDOSCOPY   • COLONOSCOPY N/A 12/28/2019    Performed by Alexys Meneses MD at [address] [place] [address] ENDOSCOPY   • COLONOSCOPY N/A 10/31/2017    Performed by Ivy Oconnell 2-4 times a month      Drinks per session: 1 or 2      Comment: (2drinks) weekly/ social    Drug use: No    Family History   Problem Relation Age of Onset   • Cancer Father         bladder   • Hypertension Father    • Gastro-Intestinal Disorder Other

## 2020-10-30 ENCOUNTER — APPOINTMENT (OUTPATIENT)
Dept: LAB | Age: 55
End: 2020-10-30
Attending: INTERNAL MEDICINE
Payer: COMMERCIAL

## 2020-10-30 DIAGNOSIS — Z20.822 EXPOSURE TO COVID-19 VIRUS: ICD-10-CM

## 2020-10-31 DIAGNOSIS — E78.00 HYPERCHOLESTEROLEMIA: ICD-10-CM

## 2020-10-31 RX ORDER — FAMOTIDINE 20 MG/1
TABLET ORAL
Qty: 180 TABLET | Refills: 1 | Status: SHIPPED | OUTPATIENT
Start: 2020-10-31 | End: 2021-06-21

## 2020-10-31 RX ORDER — ATORVASTATIN CALCIUM 10 MG/1
TABLET, FILM COATED ORAL
Qty: 90 TABLET | Refills: 1 | Status: SHIPPED | OUTPATIENT
Start: 2020-10-31 | End: 2021-04-30

## 2020-11-30 DIAGNOSIS — E11.9 CONTROLLED TYPE 2 DIABETES MELLITUS WITHOUT COMPLICATION, WITHOUT LONG-TERM CURRENT USE OF INSULIN (HCC): ICD-10-CM

## 2020-11-30 RX ORDER — SITAGLIPTIN 100 MG/1
TABLET, FILM COATED ORAL
Qty: 90 TABLET | Refills: 1 | Status: SHIPPED | OUTPATIENT
Start: 2020-11-30 | End: 2021-05-18

## 2020-12-03 ENCOUNTER — TELEMEDICINE (OUTPATIENT)
Dept: INTERNAL MEDICINE CLINIC | Facility: CLINIC | Age: 55
End: 2020-12-03

## 2020-12-03 DIAGNOSIS — M76.32 ILIOTIBIAL BAND SYNDROME OF LEFT SIDE: Primary | ICD-10-CM

## 2020-12-03 DIAGNOSIS — F32.A ANXIETY AND DEPRESSION: ICD-10-CM

## 2020-12-03 DIAGNOSIS — M72.2 PLANTAR FASCIITIS OF LEFT FOOT: ICD-10-CM

## 2020-12-03 DIAGNOSIS — N18.30 CONTROLLED TYPE 2 DIABETES MELLITUS WITH STAGE 3 CHRONIC KIDNEY DISEASE, WITHOUT LONG-TERM CURRENT USE OF INSULIN (HCC): ICD-10-CM

## 2020-12-03 DIAGNOSIS — F41.9 ANXIETY AND DEPRESSION: ICD-10-CM

## 2020-12-03 DIAGNOSIS — E11.22 CONTROLLED TYPE 2 DIABETES MELLITUS WITH STAGE 3 CHRONIC KIDNEY DISEASE, WITHOUT LONG-TERM CURRENT USE OF INSULIN (HCC): ICD-10-CM

## 2020-12-03 PROCEDURE — 99214 OFFICE O/P EST MOD 30 MIN: CPT | Performed by: INTERNAL MEDICINE

## 2020-12-03 RX ORDER — LOSARTAN POTASSIUM 25 MG/1
25 TABLET ORAL DAILY
Qty: 90 TABLET | Refills: 3 | Status: SHIPPED | OUTPATIENT
Start: 2020-12-03 | End: 2021-11-15

## 2020-12-03 NOTE — PROGRESS NOTES
Video Progress Note  Telehealth Verbal Consent   I conducted a telehealth visit with Shiva Johnson today, 12/03/20, which was completed using two-way, real-time interactive audio and video communication.  This has been done in good mario to provide cont has been waxing and waning. The quality of the pain is described as aching. The pain is moderate. Pertinent negatives include no fever. Exacerbated by: inactivity. She has tried movement for the symptoms. The treatment provided moderate relief.    Diabetes • JANUVIA 100 MG Oral Tab TAKE 1 TABLET DAILY 90 tablet 1   • SERTRALINE HCL 50 MG Oral Tab TAKE 1 TABLET DAILY 90 tablet 1   • FAMOTIDINE 20 MG Oral Tab TAKE 1 TABLET TWICE A  tablet 1   • ATORVASTATIN 10 MG Oral Tab TAKE 1 TABLET DAILY 90 tablet and oriented to person, place, and time. Psychiatric: Thought content normal. Her mood appears depressed.               ASSESSMENT/PLAN:     Problem List Items Addressed This Visit        High    Controlled type 2 diabetes mellitus with stage 3 chronic ki

## 2020-12-03 NOTE — ASSESSMENT & PLAN NOTE
Patient's A1c was 7.1. She has mild nephropathy. We will start losartan. She is up-to-date on her eye exam.  Continue current medications.

## 2020-12-03 NOTE — PATIENT INSTRUCTIONS
Iliotibial Band Stretch (Flexibility)     1. Stand next to a chair. Hold onto the chair with your right hand for support. Cross your right leg behind your left leg. 2. Lean your right hip toward the right. Feel the stretch at the outside of your hip.   3 injection into the foot, or surgery, may be needed. Home care  · If you are overweight, lose weight to help healing. · Choose supportive shoes with good arch support and shock absorbency. Replace athletic shoes when they become worn out.  Don’t walk or ru 5/1/2018  © 1645-7275 [employer] [address]. [address] [facility], [facility] [place]. All rights reserved. This information is not intended as a substitute for professional medical care. Always follow your healthcare professional's instructions.

## 2021-04-03 DIAGNOSIS — K21.9 GASTROESOPHAGEAL REFLUX DISEASE: ICD-10-CM

## 2021-04-04 RX ORDER — PANTOPRAZOLE SODIUM 40 MG/1
40 TABLET, DELAYED RELEASE ORAL EVERY MORNING
Qty: 90 TABLET | Refills: 0 | Status: SHIPPED | OUTPATIENT
Start: 2021-04-04 | End: 2021-06-21

## 2021-04-29 ENCOUNTER — LAB ENCOUNTER (OUTPATIENT)
Dept: LAB | Facility: HOSPITAL | Age: 56
End: 2021-04-29
Attending: INTERNAL MEDICINE
Payer: COMMERCIAL

## 2021-04-29 ENCOUNTER — OFFICE VISIT (OUTPATIENT)
Dept: INTERNAL MEDICINE CLINIC | Facility: CLINIC | Age: 56
End: 2021-04-29
Payer: COMMERCIAL

## 2021-04-29 VITALS
SYSTOLIC BLOOD PRESSURE: 123 MMHG | DIASTOLIC BLOOD PRESSURE: 81 MMHG | WEIGHT: 193.5 LBS | BODY MASS INDEX: 34.29 KG/M2 | HEIGHT: 63 IN | HEART RATE: 76 BPM

## 2021-04-29 DIAGNOSIS — Z00.00 ROUTINE HEALTH MAINTENANCE: ICD-10-CM

## 2021-04-29 DIAGNOSIS — E11.22 CONTROLLED TYPE 2 DIABETES MELLITUS WITH STAGE 3 CHRONIC KIDNEY DISEASE, WITHOUT LONG-TERM CURRENT USE OF INSULIN (HCC): Primary | ICD-10-CM

## 2021-04-29 DIAGNOSIS — E66.9 OBESITY (BMI 30-39.9): ICD-10-CM

## 2021-04-29 DIAGNOSIS — E11.22 CONTROLLED TYPE 2 DIABETES MELLITUS WITH STAGE 3 CHRONIC KIDNEY DISEASE, WITHOUT LONG-TERM CURRENT USE OF INSULIN (HCC): ICD-10-CM

## 2021-04-29 DIAGNOSIS — N18.30 CONTROLLED TYPE 2 DIABETES MELLITUS WITH STAGE 3 CHRONIC KIDNEY DISEASE, WITHOUT LONG-TERM CURRENT USE OF INSULIN (HCC): Primary | ICD-10-CM

## 2021-04-29 DIAGNOSIS — N18.30 CONTROLLED TYPE 2 DIABETES MELLITUS WITH STAGE 3 CHRONIC KIDNEY DISEASE, WITHOUT LONG-TERM CURRENT USE OF INSULIN (HCC): ICD-10-CM

## 2021-04-29 DIAGNOSIS — M72.2 PLANTAR FASCIITIS OF LEFT FOOT: ICD-10-CM

## 2021-04-29 PROBLEM — N64.4 BREAST PAIN, RIGHT: Status: RESOLVED | Noted: 2020-08-31 | Resolved: 2021-04-29

## 2021-04-29 PROBLEM — D64.9 ANEMIA: Status: RESOLVED | Noted: 2019-11-26 | Resolved: 2021-04-29

## 2021-04-29 PROBLEM — R07.81 RIB PAIN ON RIGHT SIDE: Status: RESOLVED | Noted: 2020-08-31 | Resolved: 2021-04-29

## 2021-04-29 PROCEDURE — 83036 HEMOGLOBIN GLYCOSYLATED A1C: CPT

## 2021-04-29 PROCEDURE — 3061F NEG MICROALBUMINURIA REV: CPT | Performed by: INTERNAL MEDICINE

## 2021-04-29 PROCEDURE — 36415 COLL VENOUS BLD VENIPUNCTURE: CPT

## 2021-04-29 PROCEDURE — 84443 ASSAY THYROID STIM HORMONE: CPT

## 2021-04-29 PROCEDURE — 3044F HG A1C LEVEL LT 7.0%: CPT | Performed by: INTERNAL MEDICINE

## 2021-04-29 PROCEDURE — 82570 ASSAY OF URINE CREATININE: CPT

## 2021-04-29 PROCEDURE — 3074F SYST BP LT 130 MM HG: CPT | Performed by: INTERNAL MEDICINE

## 2021-04-29 PROCEDURE — 83721 ASSAY OF BLOOD LIPOPROTEIN: CPT

## 2021-04-29 PROCEDURE — 80053 COMPREHEN METABOLIC PANEL: CPT

## 2021-04-29 PROCEDURE — 3008F BODY MASS INDEX DOCD: CPT | Performed by: INTERNAL MEDICINE

## 2021-04-29 PROCEDURE — 3079F DIAST BP 80-89 MM HG: CPT | Performed by: INTERNAL MEDICINE

## 2021-04-29 PROCEDURE — 85025 COMPLETE CBC W/AUTO DIFF WBC: CPT

## 2021-04-29 PROCEDURE — 99214 OFFICE O/P EST MOD 30 MIN: CPT | Performed by: INTERNAL MEDICINE

## 2021-04-29 PROCEDURE — 82043 UR ALBUMIN QUANTITATIVE: CPT

## 2021-04-29 RX ORDER — SEMAGLUTIDE 1.34 MG/ML
0.25 INJECTION, SOLUTION SUBCUTANEOUS
Qty: 3 PEN | Refills: 0 | Status: SHIPPED | OUTPATIENT
Start: 2021-04-29 | End: 2021-05-04

## 2021-04-29 NOTE — PROGRESS NOTES
HPI:    Patient ID: Westley Green is a 60year old female. HPI:  She doesn't check her blood sugars. Her plantar fasciitis is still bothering her. She will see Dr. Payan Him.   She saw Dr. Denise [phone] and was started on allergy medication which helps her ea Smoking status: Former Smoker      Smokeless tobacco: Never Used      Tobacco comment: quit 40 years ago     Vaping Use      Vaping Use: Never used    Alcohol use:  Yes      Alcohol/week: 0.0 standard drinks      Comment: (2drinks) weekly/ social    Drug us (OZEMPIC, 0.25 OR 0.5 MG/DOSE,) 2 MG/1.5ML Subcutaneous Solution Pen-injector    Other Relevant Orders    MICROALB/CREAT RATIO, RANDOM URINE (Completed)    HEMOGLOBIN A1C (Completed)    DIABETIC EDUCATION - INTERNAL    DME DIABETIC TEST STRIPS AND SUPPLIES

## 2021-04-30 DIAGNOSIS — E78.00 HYPERCHOLESTEROLEMIA: ICD-10-CM

## 2021-04-30 RX ORDER — ATORVASTATIN CALCIUM 10 MG/1
TABLET, FILM COATED ORAL
Qty: 90 TABLET | Refills: 1 | Status: SHIPPED | OUTPATIENT
Start: 2021-04-30 | End: 2021-10-19

## 2021-04-30 RX ORDER — METFORMIN HYDROCHLORIDE 500 MG/1
500 TABLET, EXTENDED RELEASE ORAL
Qty: 90 TABLET | Refills: 0 | Status: SHIPPED | OUTPATIENT
Start: 2021-04-30 | End: 2021-07-22

## 2021-05-03 ENCOUNTER — OFFICE VISIT (OUTPATIENT)
Dept: PODIATRY CLINIC | Facility: CLINIC | Age: 56
End: 2021-05-03
Payer: COMMERCIAL

## 2021-05-03 VITALS — HEIGHT: 63 IN | WEIGHT: 190 LBS | BODY MASS INDEX: 33.66 KG/M2

## 2021-05-03 DIAGNOSIS — M72.2 PLANTAR FASCIITIS OF LEFT FOOT: Primary | ICD-10-CM

## 2021-05-03 PROCEDURE — 99243 OFF/OP CNSLTJ NEW/EST LOW 30: CPT | Performed by: PODIATRIST

## 2021-05-03 PROCEDURE — 3008F BODY MASS INDEX DOCD: CPT | Performed by: PODIATRIST

## 2021-05-03 NOTE — PROGRESS NOTES
HPI:    Patient ID: Lottie Roman is a 60year old female. This pleasant 57-year-old female presents as a new patient to me on consult from [address] [place] [address]. Patient's chief concern is left heel pain. She has been having heel pain for a number of months. is caused by direct palpation of the fascial insertion into the calcaneus. There is adequate ankle joint range of motion with the knee extended. There is good subtalar and midtarsal joint ranges of motion.   Weightbearing confirms a moderately pronated fu

## 2021-05-04 ENCOUNTER — TELEPHONE (OUTPATIENT)
Dept: INTERNAL MEDICINE CLINIC | Facility: CLINIC | Age: 56
End: 2021-05-04

## 2021-05-04 DIAGNOSIS — E11.22 CONTROLLED TYPE 2 DIABETES MELLITUS WITH STAGE 3 CHRONIC KIDNEY DISEASE, WITHOUT LONG-TERM CURRENT USE OF INSULIN (HCC): ICD-10-CM

## 2021-05-04 DIAGNOSIS — N18.30 CONTROLLED TYPE 2 DIABETES MELLITUS WITH STAGE 3 CHRONIC KIDNEY DISEASE, WITHOUT LONG-TERM CURRENT USE OF INSULIN (HCC): ICD-10-CM

## 2021-05-04 RX ORDER — SEMAGLUTIDE 1.34 MG/ML
0.25 INJECTION, SOLUTION SUBCUTANEOUS
Qty: 3 PEN | Refills: 0 | Status: SHIPPED | OUTPATIENT
Start: 2021-05-04 | End: 2021-06-01

## 2021-05-04 NOTE — TELEPHONE ENCOUNTER
Sorry.  Fixed. Lower dose for first month, then higher dose. Always once per week.     Requested Prescriptions     Signed Prescriptions Disp Refills   • Semaglutide,0.25 or 0.5MG/DOS, (OZEMPIC, 0.25 OR 0.5 MG/DOSE,) 2 MG/1.5ML Subcutaneous Solution Pen-in

## 2021-05-04 NOTE — TELEPHONE ENCOUNTER
Please review the following order for ozempic-   Contacted [facility] and they need to clarify the following order below: after   every 7 days 28th days. Should the dose be .5mg daily or monthly? Subcutaneous. [facility] states it is a weekly injection.     Referenc

## 2021-05-04 NOTE — TELEPHONE ENCOUNTER
Fatuma from [facility] mail order called, they want to clarify the instructions on the following medication. Please call back.     Reference # [id number]  Phone # [phone]  Fax # [phone]    Medication Detail    Medication Quantity Refills Start End   Sema

## 2021-05-11 ENCOUNTER — HOSPITAL ENCOUNTER (OUTPATIENT)
Dept: ULTRASOUND IMAGING | Facility: HOSPITAL | Age: 56
Discharge: HOME | End: 2021-05-11
Attending: INTERNAL MEDICINE
Payer: COMMERCIAL

## 2021-05-11 ENCOUNTER — HOSPITAL ENCOUNTER (OUTPATIENT)
Dept: ENDOCRINOLOGY | Facility: HOSPITAL | Age: 56
Discharge: HOME | End: 2021-05-11
Attending: INTERNAL MEDICINE
Payer: COMMERCIAL

## 2021-05-11 VITALS — WEIGHT: 196.81 LBS | BODY MASS INDEX: 35 KG/M2

## 2021-05-11 DIAGNOSIS — R92.8 ABNORMAL MAMMOGRAM: ICD-10-CM

## 2021-05-11 DIAGNOSIS — N18.30 CONTROLLED TYPE 2 DIABETES MELLITUS WITH STAGE 3 CHRONIC KIDNEY DISEASE, WITHOUT LONG-TERM CURRENT USE OF INSULIN (HCC): Primary | ICD-10-CM

## 2021-05-11 DIAGNOSIS — E11.22 CONTROLLED TYPE 2 DIABETES MELLITUS WITH STAGE 3 CHRONIC KIDNEY DISEASE, WITHOUT LONG-TERM CURRENT USE OF INSULIN (HCC): Primary | ICD-10-CM

## 2021-05-11 DIAGNOSIS — N64.4 MASTODYNIA: ICD-10-CM

## 2021-05-11 PROCEDURE — 76642 ULTRASOUND BREAST LIMITED: CPT | Performed by: INTERNAL MEDICINE

## 2021-05-11 NOTE — PROGRESS NOTES
Reyes Ishikawa  : 1965 attended individual initial assessment for Diabetes Education and Injection Teaching for Ozempic:    Date: 2021   Start time: 830  End time: 930    HgbA1C (%)   Date Value   2021 6.7 (H)        Assessment: New directed. Follow Basic Diet Guidelines. Attend Group Class series (4 classes)- pt will call for classes vs. 1:1 after checking coverage with insurance co.      Written materials provided for all areas covered.     Patient verbalized understanding and has

## 2021-05-17 ENCOUNTER — OFFICE VISIT (OUTPATIENT)
Dept: PODIATRY CLINIC | Facility: CLINIC | Age: 56
End: 2021-05-17
Payer: COMMERCIAL

## 2021-05-17 DIAGNOSIS — M72.2 PLANTAR FASCIITIS OF LEFT FOOT: Primary | ICD-10-CM

## 2021-05-17 PROCEDURE — 99212 OFFICE O/P EST SF 10 MIN: CPT | Performed by: PODIATRIST

## 2021-05-17 NOTE — PROGRESS NOTES
HPI:    Patient ID: Connie Beckwith is a 60year old female. 22-year-old female presents for follow-up in reference to left heel pain. Patient states over the last couple of weeks she is about 75% improved.   She still has daily pain but it is minimal icing at least once every evening for 15 minutes. Discontinue the medication after 2 weeks plan follow-up a week later.   Patient is well-informed she indicates an understanding and I will follow-up in 3 weeks           ASSESSMENT/PLAN:   Plantar fasciitis

## 2021-05-18 DIAGNOSIS — E11.9 CONTROLLED TYPE 2 DIABETES MELLITUS WITHOUT COMPLICATION, WITHOUT LONG-TERM CURRENT USE OF INSULIN (HCC): ICD-10-CM

## 2021-05-18 RX ORDER — SITAGLIPTIN 100 MG/1
TABLET, FILM COATED ORAL
Qty: 90 TABLET | Refills: 1 | Status: SHIPPED | OUTPATIENT
Start: 2021-05-18 | End: 2021-06-14 | Stop reason: ALTCHOICE

## 2021-06-14 ENCOUNTER — OFFICE VISIT (OUTPATIENT)
Dept: PODIATRY CLINIC | Facility: CLINIC | Age: 56
End: 2021-06-14
Payer: COMMERCIAL

## 2021-06-14 DIAGNOSIS — M72.2 PLANTAR FASCIITIS OF LEFT FOOT: Primary | ICD-10-CM

## 2021-06-14 PROCEDURE — 99213 OFFICE O/P EST LOW 20 MIN: CPT | Performed by: PODIATRIST

## 2021-06-14 RX ORDER — LORATADINE 10 MG/1
TABLET ORAL
COMMUNITY
Start: 2020-09-07

## 2021-06-14 RX ORDER — SEMAGLUTIDE 1.34 MG/ML
INJECTION, SOLUTION SUBCUTANEOUS
COMMUNITY
Start: 2021-05-11 | End: 2021-07-20

## 2021-06-14 NOTE — PROGRESS NOTES
HPI:    Patient ID: Bo Amanda is a 60year old female. This 25-year-old female presents for follow-up in reference to left heel pain. She is about status quo since the last visit.   She admits to not being consistent with the insole because she wa to how well she is actually doing.   There is consideration for a cortisone injection follow-up in 3 weeks       ASSESSMENT/PLAN:   Plantar fasciitis of left foot  (primary encounter diagnosis)    No orders of the defined types were placed in this encounter

## 2021-06-21 DIAGNOSIS — K21.9 GASTROESOPHAGEAL REFLUX DISEASE: ICD-10-CM

## 2021-06-21 RX ORDER — PANTOPRAZOLE SODIUM 40 MG/1
TABLET, DELAYED RELEASE ORAL
Qty: 90 TABLET | Refills: 0 | Status: SHIPPED | OUTPATIENT
Start: 2021-06-21 | End: 2021-07-20

## 2021-06-21 RX ORDER — FAMOTIDINE 20 MG/1
TABLET ORAL
Qty: 180 TABLET | Refills: 1 | Status: SHIPPED | OUTPATIENT
Start: 2021-06-21 | End: 2021-10-23

## 2021-07-20 ENCOUNTER — OFFICE VISIT (OUTPATIENT)
Dept: INTERNAL MEDICINE CLINIC | Facility: CLINIC | Age: 56
End: 2021-07-20
Payer: COMMERCIAL

## 2021-07-20 VITALS
RESPIRATION RATE: 18 BRPM | HEART RATE: 76 BPM | DIASTOLIC BLOOD PRESSURE: 72 MMHG | WEIGHT: 188.19 LBS | HEIGHT: 63 IN | BODY MASS INDEX: 33.34 KG/M2 | SYSTOLIC BLOOD PRESSURE: 108 MMHG

## 2021-07-20 DIAGNOSIS — E66.9 OBESITY (BMI 30-39.9): ICD-10-CM

## 2021-07-20 DIAGNOSIS — N18.30 CONTROLLED TYPE 2 DIABETES MELLITUS WITH STAGE 3 CHRONIC KIDNEY DISEASE, WITHOUT LONG-TERM CURRENT USE OF INSULIN (HCC): Primary | ICD-10-CM

## 2021-07-20 DIAGNOSIS — K21.9 GASTROESOPHAGEAL REFLUX DISEASE: ICD-10-CM

## 2021-07-20 DIAGNOSIS — E11.22 CONTROLLED TYPE 2 DIABETES MELLITUS WITH STAGE 3 CHRONIC KIDNEY DISEASE, WITHOUT LONG-TERM CURRENT USE OF INSULIN (HCC): Primary | ICD-10-CM

## 2021-07-20 DIAGNOSIS — K22.70 BARRETT'S ESOPHAGUS WITHOUT DYSPLASIA: ICD-10-CM

## 2021-07-20 LAB
CARTRIDGE LOT#: ABNORMAL NUMERIC
HEMOGLOBIN A1C: 6.4 % (ref 4.3–5.6)

## 2021-07-20 PROCEDURE — 3078F DIAST BP <80 MM HG: CPT | Performed by: INTERNAL MEDICINE

## 2021-07-20 PROCEDURE — 99214 OFFICE O/P EST MOD 30 MIN: CPT | Performed by: INTERNAL MEDICINE

## 2021-07-20 PROCEDURE — 83036 HEMOGLOBIN GLYCOSYLATED A1C: CPT | Performed by: INTERNAL MEDICINE

## 2021-07-20 PROCEDURE — 3008F BODY MASS INDEX DOCD: CPT | Performed by: INTERNAL MEDICINE

## 2021-07-20 PROCEDURE — 3074F SYST BP LT 130 MM HG: CPT | Performed by: INTERNAL MEDICINE

## 2021-07-20 PROCEDURE — 36416 COLLJ CAPILLARY BLOOD SPEC: CPT | Performed by: INTERNAL MEDICINE

## 2021-07-20 RX ORDER — METFORMIN HYDROCHLORIDE 500 MG/1
500 TABLET, EXTENDED RELEASE ORAL
Qty: 90 TABLET | Refills: 0 | Status: CANCELLED | OUTPATIENT
Start: 2021-07-20

## 2021-07-20 RX ORDER — PANTOPRAZOLE SODIUM 40 MG/1
40 TABLET, DELAYED RELEASE ORAL EVERY MORNING
Qty: 90 TABLET | Refills: 1 | Status: SHIPPED | OUTPATIENT
Start: 2021-07-20 | End: 2022-01-20

## 2021-07-20 RX ORDER — SEMAGLUTIDE 1.34 MG/ML
0.5 INJECTION, SOLUTION SUBCUTANEOUS WEEKLY
Qty: 4.5 ML | Refills: 1 | Status: SHIPPED | OUTPATIENT
Start: 2021-07-20 | End: 2021-07-21

## 2021-07-20 NOTE — ASSESSMENT & PLAN NOTE
Patient's A1c was 6.4 today. She has side effects from Metformin, so we will decrease it now that she is taking the Ozempic. She has leftover Januvia, which I advised her to hang onto in case we needed at some future date. Continue losartan.   Follow-up

## 2021-07-20 NOTE — PROGRESS NOTES
HPI:    Patient ID: Bo Amanda is a 60year old female. HPI:  Pt is on Ozempic. Sometimes it makes her nauseated. She has lost some 4 pounds. She does have diarrhea on Metformin. She is taking both pantoprazole and famotidine for her GERD. Alcohol/week: 0.0 standard drinks      Comment: (2drinks) weekly/ social    Drug use: No    Family History   Problem Relation Age of Onset   • Cancer Father         bladder   • Hypertension Father    • Gastro-Intestinal Disorder Other         family h/o Unprioritized    Gastroesophageal reflux disease     Patient is taking both pantoprazole and famotidine per the gastroenterologist.  She will schedule follow-up appointment with him.          Relevant Medications    Pantoprazole Sodium 40 MG Oral Tab EC

## 2021-07-20 NOTE — ASSESSMENT & PLAN NOTE
Patient is taking both pantoprazole and famotidine per the gastroenterologist.  She will schedule follow-up appointment with him.

## 2021-07-21 DIAGNOSIS — N18.30 CONTROLLED TYPE 2 DIABETES MELLITUS WITH STAGE 3 CHRONIC KIDNEY DISEASE, WITHOUT LONG-TERM CURRENT USE OF INSULIN (HCC): ICD-10-CM

## 2021-07-21 DIAGNOSIS — E11.22 CONTROLLED TYPE 2 DIABETES MELLITUS WITH STAGE 3 CHRONIC KIDNEY DISEASE, WITHOUT LONG-TERM CURRENT USE OF INSULIN (HCC): ICD-10-CM

## 2021-07-21 RX ORDER — SEMAGLUTIDE 1.34 MG/ML
INJECTION, SOLUTION SUBCUTANEOUS
Qty: 4.5 ML | Refills: 0 | Status: SHIPPED | OUTPATIENT
Start: 2021-07-21 | End: 2022-01-20

## 2021-07-22 RX ORDER — METFORMIN HYDROCHLORIDE 500 MG/1
500 TABLET, EXTENDED RELEASE ORAL
Qty: 90 TABLET | Refills: 1 | Status: SHIPPED | OUTPATIENT
Start: 2021-07-22 | End: 2021-07-28

## 2021-07-28 ENCOUNTER — OFFICE VISIT (OUTPATIENT)
Dept: PODIATRY CLINIC | Facility: CLINIC | Age: 56
End: 2021-07-28
Payer: COMMERCIAL

## 2021-07-28 VITALS — SYSTOLIC BLOOD PRESSURE: 113 MMHG | RESPIRATION RATE: 12 BRPM | DIASTOLIC BLOOD PRESSURE: 74 MMHG | HEART RATE: 80 BPM

## 2021-07-28 DIAGNOSIS — M72.2 PLANTAR FASCIITIS OF LEFT FOOT: Primary | ICD-10-CM

## 2021-07-28 PROCEDURE — 20550 NJX 1 TENDON SHEATH/LIGAMENT: CPT | Performed by: PODIATRIST

## 2021-07-28 PROCEDURE — 3074F SYST BP LT 130 MM HG: CPT | Performed by: PODIATRIST

## 2021-07-28 PROCEDURE — 3078F DIAST BP <80 MM HG: CPT | Performed by: PODIATRIST

## 2021-07-28 RX ORDER — CETIRIZINE HYDROCHLORIDE 10 MG/1
TABLET ORAL
COMMUNITY
Start: 2020-09-04 | End: 2022-01-20 | Stop reason: ALTCHOICE

## 2021-07-28 RX ORDER — METHYLPREDNISOLONE ACETATE 80 MG/ML
20 INJECTION, SUSPENSION INTRA-ARTICULAR; INTRALESIONAL; INTRAMUSCULAR; SOFT TISSUE ONCE
Status: COMPLETED | OUTPATIENT
Start: 2021-07-28 | End: 2021-07-28

## 2021-07-28 RX ADMIN — METHYLPREDNISOLONE ACETATE 20 MG: 80 INJECTION, SUSPENSION INTRA-ARTICULAR; INTRALESIONAL; INTRAMUSCULAR; SOFT TISSUE at 16:00:00

## 2021-07-28 NOTE — PROGRESS NOTES
Per verbal order from Dr. Clau Dinh, draw up 0.5ml of 0.5% Marcaine and 20 mg of Depo-Medrol for cortisone injection to left heel. Sung Ford RN  Patient provided education handout for cortisone injection. No VS taken post injection.

## 2021-07-28 NOTE — PROGRESS NOTES
HPI:    Patient ID: Xavier Mosquera is a 60year old female. 28-year-old female presents for follow-up in reference to left heel pain. Patient just is not getting better. She has some continued discomfort on a daily basis.   Some days are worse than indicates an understanding I will reevaluate in 1 month         ASSESSMENT/PLAN:   Plantar fasciitis of left foot  (primary encounter diagnosis)    Orders Placed This Encounter      tendon sheath/ligament      Meds This Visit:  Requested Prescriptions

## 2021-08-25 ENCOUNTER — OFFICE VISIT (OUTPATIENT)
Dept: PODIATRY CLINIC | Facility: CLINIC | Age: 56
End: 2021-08-25
Payer: COMMERCIAL

## 2021-08-25 DIAGNOSIS — M72.2 PLANTAR FASCIITIS OF LEFT FOOT: Primary | ICD-10-CM

## 2021-08-25 PROCEDURE — 99213 OFFICE O/P EST LOW 20 MIN: CPT | Performed by: PODIATRIST

## 2021-08-25 NOTE — PROGRESS NOTES
HPI:    Patient ID: Wilfrido Hernandez is a 60year old female. This 67-year-old female presents for follow-up in reference to left heel pain. On July 28 I injected this left heel with Marcaine and Depo-Medrol.   She states in the course of the past month this encounter       Imaging & Referrals:  None       ES#[id number]

## 2021-09-17 ENCOUNTER — PATIENT MESSAGE (OUTPATIENT)
Dept: INTERNAL MEDICINE CLINIC | Facility: CLINIC | Age: 56
End: 2021-09-17

## 2021-10-04 ENCOUNTER — OFFICE VISIT (OUTPATIENT)
Dept: GASTROENTEROLOGY | Facility: CLINIC | Age: 56
End: 2021-10-04
Payer: COMMERCIAL

## 2021-10-04 VITALS
BODY MASS INDEX: 32.78 KG/M2 | WEIGHT: 185 LBS | SYSTOLIC BLOOD PRESSURE: 100 MMHG | HEIGHT: 63 IN | HEART RATE: 93 BPM | DIASTOLIC BLOOD PRESSURE: 68 MMHG

## 2021-10-04 DIAGNOSIS — K14.6 BURNING MOUTH SYNDROME: Primary | ICD-10-CM

## 2021-10-04 DIAGNOSIS — K21.9 GASTROESOPHAGEAL REFLUX DISEASE WITHOUT ESOPHAGITIS: ICD-10-CM

## 2021-10-04 DIAGNOSIS — K22.70 SHORT-SEGMENT BARRETT'S ESOPHAGUS: ICD-10-CM

## 2021-10-04 DIAGNOSIS — Z86.010 HISTORY OF COLON POLYPS: ICD-10-CM

## 2021-10-04 PROCEDURE — 3074F SYST BP LT 130 MM HG: CPT | Performed by: INTERNAL MEDICINE

## 2021-10-04 PROCEDURE — 99213 OFFICE O/P EST LOW 20 MIN: CPT | Performed by: INTERNAL MEDICINE

## 2021-10-04 PROCEDURE — 3078F DIAST BP <80 MM HG: CPT | Performed by: INTERNAL MEDICINE

## 2021-10-04 PROCEDURE — 3008F BODY MASS INDEX DOCD: CPT | Performed by: INTERNAL MEDICINE

## 2021-10-04 RX ORDER — METFORMIN HYDROCHLORIDE 500 MG/1
TABLET, EXTENDED RELEASE ORAL
COMMUNITY
Start: 2021-07-28 | End: 2021-12-23

## 2021-10-04 RX ORDER — BLOOD SUGAR DIAGNOSTIC
STRIP MISCELLANEOUS DAILY
COMMUNITY
Start: 2021-07-26

## 2021-10-09 NOTE — PATIENT INSTRUCTIONS
1. May stop the famotidine. Continue the pantoprazole. 2. You are due for an upper endoscopy in December 2022 and a colonoscopy in December 2024. 3. Please contact me with any changes.

## 2021-10-09 NOTE — PROGRESS NOTES
Subjective:   Patient ID: Robert Ariza is a 60year old female. HPI  The patient returns in follow-up.   She was last seen in December 2019.     As per previous notes the patient has a history of multiple adenomatous colon polyps, nonerosive gastroe year and questions whether any testing needs to be done at this time.       History/Other:   Review of Systems   See above    Wt Readings from Last 6 Encounters:  10/04/21 : 185 lb (83.9 kg)  07/20/21 : 188 lb 3.2 oz (85.4 kg)  05/11/21 : 196 lb 12.8 oz (89 Abdominal:      General: Bowel sounds are normal. There is no distension. Palpations: Abdomen is soft. There is no mass. Tenderness: There is no abdominal tenderness. There is no guarding or rebound.    Musculoskeletal:      Cervical back: Neck encounter       Imaging & Referrals:  None

## 2021-10-19 ENCOUNTER — HOSPITAL ENCOUNTER (OUTPATIENT)
Dept: MAMMOGRAPHY | Facility: HOSPITAL | Age: 56
Discharge: HOME | End: 2021-10-19
Attending: INTERNAL MEDICINE
Payer: COMMERCIAL

## 2021-10-19 DIAGNOSIS — E78.00 HYPERCHOLESTEROLEMIA: ICD-10-CM

## 2021-10-19 DIAGNOSIS — Z12.39 ENCOUNTER FOR SCREENING FOR MALIGNANT NEOPLASM OF BREAST, UNSPECIFIED SCREENING MODALITY: ICD-10-CM

## 2021-10-19 PROCEDURE — 77063 BREAST TOMOSYNTHESIS BI: CPT | Performed by: INTERNAL MEDICINE

## 2021-10-19 PROCEDURE — 77067 SCR MAMMO BI INCL CAD: CPT | Performed by: INTERNAL MEDICINE

## 2021-10-19 RX ORDER — ATORVASTATIN CALCIUM 10 MG/1
10 TABLET, FILM COATED ORAL DAILY
Qty: 90 TABLET | Refills: 1 | Status: SHIPPED | OUTPATIENT
Start: 2021-10-19

## 2021-10-23 ENCOUNTER — OFFICE VISIT (OUTPATIENT)
Dept: INTERNAL MEDICINE CLINIC | Facility: CLINIC | Age: 56
End: 2021-10-23
Payer: COMMERCIAL

## 2021-10-23 VITALS
DIASTOLIC BLOOD PRESSURE: 69 MMHG | BODY MASS INDEX: 32.99 KG/M2 | WEIGHT: 186.19 LBS | SYSTOLIC BLOOD PRESSURE: 100 MMHG | HEART RATE: 81 BPM | HEIGHT: 63 IN

## 2021-10-23 DIAGNOSIS — Z00.00 ROUTINE HEALTH MAINTENANCE: Primary | ICD-10-CM

## 2021-10-23 DIAGNOSIS — N18.30 CONTROLLED TYPE 2 DIABETES MELLITUS WITH STAGE 3 CHRONIC KIDNEY DISEASE, WITHOUT LONG-TERM CURRENT USE OF INSULIN (HCC): ICD-10-CM

## 2021-10-23 DIAGNOSIS — E11.22 CONTROLLED TYPE 2 DIABETES MELLITUS WITH STAGE 3 CHRONIC KIDNEY DISEASE, WITHOUT LONG-TERM CURRENT USE OF INSULIN (HCC): ICD-10-CM

## 2021-10-23 DIAGNOSIS — K22.70 BARRETT'S ESOPHAGUS WITHOUT DYSPLASIA: ICD-10-CM

## 2021-10-23 PROCEDURE — 99396 PREV VISIT EST AGE 40-64: CPT | Performed by: INTERNAL MEDICINE

## 2021-10-23 PROCEDURE — 3074F SYST BP LT 130 MM HG: CPT | Performed by: INTERNAL MEDICINE

## 2021-10-23 PROCEDURE — 99214 OFFICE O/P EST MOD 30 MIN: CPT | Performed by: INTERNAL MEDICINE

## 2021-10-23 PROCEDURE — 3078F DIAST BP <80 MM HG: CPT | Performed by: INTERNAL MEDICINE

## 2021-10-23 PROCEDURE — 3044F HG A1C LEVEL LT 7.0%: CPT | Performed by: INTERNAL MEDICINE

## 2021-10-23 PROCEDURE — 83036 HEMOGLOBIN GLYCOSYLATED A1C: CPT | Performed by: INTERNAL MEDICINE

## 2021-10-23 PROCEDURE — 3008F BODY MASS INDEX DOCD: CPT | Performed by: INTERNAL MEDICINE

## 2021-10-23 RX ORDER — METFORMIN HYDROCHLORIDE 500 MG/1
500 TABLET, EXTENDED RELEASE ORAL
Qty: 90 TABLET | Refills: 3 | OUTPATIENT
Start: 2021-10-23 | End: 2021-10-23

## 2021-10-23 RX ORDER — SEMAGLUTIDE 1.34 MG/ML
0.5 INJECTION, SOLUTION SUBCUTANEOUS WEEKLY
Qty: 4.5 ML | Refills: 1 | Status: SHIPPED | OUTPATIENT
Start: 2021-10-23 | End: 2022-01-20

## 2021-10-23 NOTE — PROGRESS NOTES
HPI:    Patient ID: Angelito Bledsoe is a 60year old female. HPI:  Pt is here for a physical.  Pt c/o tingling in her hands. Pt saw GI and was told to stop the famotidine and just take pantoprazole.   She has lost weight with Ozempic and needs a refil status: Former Smoker      Smokeless tobacco: Never Used      Tobacco comment: quit 40 years ago     Vaping Use      Vaping Use: Never used    Alcohol use: Yes      Alcohol/week: 0.0 standard drinks      Comment: (2drinks) weekly/ social    Drug use:  No Conjunctivae normal.      Pupils: Pupils are equal, round, and reactive to light. Cardiovascular:      Rate and Rhythm: Normal rate and regular rhythm. Heart sounds: Normal heart sounds. No murmur heard.       Pulmonary:      Effort: Pulmonary effort current use of insulin ([facility])     Controlled. A1c today was 6.2. Eye exam is up-to-date. Follow-up in 4 months.          Relevant Medications    Semaglutide,0.25 or 0.5MG/DOS, (OZEMPIC, 0.25 OR 0.5 MG/DOSE,) 2 MG/1.5ML Subcutaneous Solution Pen-injector

## 2021-10-23 NOTE — ASSESSMENT & PLAN NOTE
Unremarkable exam.  Pt's colon cancer screening is up to date. Immunizations were reviewed. Counseled pt regarding diet and exercise.

## 2021-11-04 ENCOUNTER — HOSPITAL ENCOUNTER (OUTPATIENT)
Dept: GENERAL RADIOLOGY | Facility: HOSPITAL | Age: 56
Discharge: HOME | End: 2021-11-04
Attending: PHYSICAL MEDICINE & REHABILITATION
Payer: COMMERCIAL

## 2021-11-04 ENCOUNTER — OFFICE VISIT (OUTPATIENT)
Dept: PHYSICAL MEDICINE AND REHAB | Facility: CLINIC | Age: 56
End: 2021-11-04
Payer: COMMERCIAL

## 2021-11-04 VITALS
WEIGHT: 186 LBS | HEART RATE: 81 BPM | SYSTOLIC BLOOD PRESSURE: 110 MMHG | HEIGHT: 63 IN | BODY MASS INDEX: 32.96 KG/M2 | DIASTOLIC BLOOD PRESSURE: 70 MMHG | OXYGEN SATURATION: 98 %

## 2021-11-04 DIAGNOSIS — M79.645 BILATERAL THUMB PAIN: ICD-10-CM

## 2021-11-04 DIAGNOSIS — M54.2 TRIGGER POINT OF NECK: ICD-10-CM

## 2021-11-04 DIAGNOSIS — N18.30 CONTROLLED TYPE 2 DIABETES MELLITUS WITH STAGE 3 CHRONIC KIDNEY DISEASE, WITHOUT LONG-TERM CURRENT USE OF INSULIN (HCC): ICD-10-CM

## 2021-11-04 DIAGNOSIS — G56.03 BILATERAL CARPAL TUNNEL SYNDROME: ICD-10-CM

## 2021-11-04 DIAGNOSIS — I10 ESSENTIAL HYPERTENSION: ICD-10-CM

## 2021-11-04 DIAGNOSIS — M54.2 NECK PAIN: ICD-10-CM

## 2021-11-04 DIAGNOSIS — E11.22 CONTROLLED TYPE 2 DIABETES MELLITUS WITH STAGE 3 CHRONIC KIDNEY DISEASE, WITHOUT LONG-TERM CURRENT USE OF INSULIN (HCC): ICD-10-CM

## 2021-11-04 DIAGNOSIS — R20.0 NUMBNESS IN BOTH HANDS: ICD-10-CM

## 2021-11-04 DIAGNOSIS — M79.644 BILATERAL THUMB PAIN: ICD-10-CM

## 2021-11-04 DIAGNOSIS — E78.5 HYPERLIPIDEMIA, UNSPECIFIED HYPERLIPIDEMIA TYPE: ICD-10-CM

## 2021-11-04 DIAGNOSIS — K21.9 GASTROESOPHAGEAL REFLUX DISEASE, UNSPECIFIED WHETHER ESOPHAGITIS PRESENT: ICD-10-CM

## 2021-11-04 DIAGNOSIS — R20.0 NUMBNESS IN BOTH HANDS: Primary | ICD-10-CM

## 2021-11-04 DIAGNOSIS — R52 PAIN: ICD-10-CM

## 2021-11-04 PROCEDURE — 3078F DIAST BP <80 MM HG: CPT | Performed by: PHYSICAL MEDICINE & REHABILITATION

## 2021-11-04 PROCEDURE — 73140 X-RAY EXAM OF FINGER(S): CPT | Performed by: PHYSICAL MEDICINE & REHABILITATION

## 2021-11-04 PROCEDURE — 3074F SYST BP LT 130 MM HG: CPT | Performed by: PHYSICAL MEDICINE & REHABILITATION

## 2021-11-04 PROCEDURE — 3008F BODY MASS INDEX DOCD: CPT | Performed by: PHYSICAL MEDICINE & REHABILITATION

## 2021-11-04 PROCEDURE — 99244 OFF/OP CNSLTJ NEW/EST MOD 40: CPT | Performed by: PHYSICAL MEDICINE & REHABILITATION

## 2021-11-04 PROCEDURE — 73110 X-RAY EXAM OF WRIST: CPT | Performed by: PHYSICAL MEDICINE & REHABILITATION

## 2021-11-04 PROCEDURE — 72050 X-RAY EXAM NECK SPINE 4/5VWS: CPT | Performed by: PHYSICAL MEDICINE & REHABILITATION

## 2021-11-04 NOTE — H&P
[address] H&P    Requesting Physician: Agapito Helms MD    Chief Complaint (Reason for Visit):  Patient presents with:  Wrist Pain: New right handed patient comes in for bilateral wrist pain that r C     • KEVIN BIOPSY STEREO NODULE 1 SITE RIGHT (CPT=19081)      samira 2013   • OTHER SURGICAL HISTORY      per NG: excision lession, local   • TONSILLECTOMY          FAMILY HISTORY:   Family History   Problem Relation Age of Onset   • Cancer Father ALLERGIES:   No Known Allergies      REVIEW OF SYSTEMS:   Review of Systems   Constitutional: Negative. HENT: Negative. Eyes: Negative. Respiratory: Negative. Cardiovascular: Negative. Gastrointestinal: Negative.     Genitourinary: Negative Final   • Cartridge Expiration Date 10/23/2021 4/30/2023  Date Final   • Interpretation/Result 10/23/2021 Negative for intraepithelial lesion or malignancy  Negative for intraepithelial lesion or malignancy Final   • Specimen Adequacy 10/23/2021 Satisfacto Range Status   • HEMOGLOBIN A1C 07/20/2021 6.4* 4.3 - 5.6 % Final   • Cartridge Lot# 07/20/2021 [id number]  Numeric Final   • Cartridge Expiration Date 07/20/2021 02/28/2023  Date Final   ]      Radiology Imaging:  NA      ASSESSMENT AND PLAN:  Bari valentin a pl

## 2021-11-04 NOTE — PATIENT INSTRUCTIONS
1) Get Xr of the cervical spine, wrists, and thumbs today on your way out  2) Begin occupational therapy as soon as possible. This can be done at [facility] or [address] [place] [address] offices.  We will give you the number  3) Use resting wrist splints (Carpal tunnel splints) all

## 2021-11-09 ENCOUNTER — PATIENT MESSAGE (OUTPATIENT)
Dept: PHYSICAL MEDICINE AND REHAB | Facility: CLINIC | Age: 56
End: 2021-11-09

## 2021-11-10 NOTE — TELEPHONE ENCOUNTER
From: Jacqulin Osgood  To: Sharmin Reilly MD  Sent: 11/9/2021 8:34 AM CST  Subject: Medication     Good morning Dr. Alfred Castañeda. Following up to my visit on 11/04, was there a prescription for the medication you wanted me to take or was it over the counter?  I hav

## 2021-11-12 RX ORDER — NAPROXEN 500 MG/1
TABLET ORAL
Qty: 60 TABLET | Refills: 0 | Status: SHIPPED | OUTPATIENT
Start: 2021-11-12 | End: 2022-01-20

## 2021-11-15 DIAGNOSIS — N18.30 CONTROLLED TYPE 2 DIABETES MELLITUS WITH STAGE 3 CHRONIC KIDNEY DISEASE, WITHOUT LONG-TERM CURRENT USE OF INSULIN (HCC): ICD-10-CM

## 2021-11-15 DIAGNOSIS — E11.22 CONTROLLED TYPE 2 DIABETES MELLITUS WITH STAGE 3 CHRONIC KIDNEY DISEASE, WITHOUT LONG-TERM CURRENT USE OF INSULIN (HCC): ICD-10-CM

## 2021-11-15 RX ORDER — LOSARTAN POTASSIUM 25 MG/1
25 TABLET ORAL DAILY
Qty: 90 TABLET | Refills: 1 | Status: SHIPPED | OUTPATIENT
Start: 2021-11-15

## 2021-11-15 RX ORDER — NAPROXEN 500 MG/1
500 TABLET ORAL 2 TIMES DAILY WITH MEALS
Qty: 60 TABLET | Refills: 0 | Status: SHIPPED | OUTPATIENT
Start: 2021-11-15 | End: 2022-01-20

## 2021-11-15 NOTE — TELEPHONE ENCOUNTER
Refilled per [facility] protocol.   Requested Prescriptions   Pending Prescriptions Disp Refills    LOSARTAN 25 MG Oral Tab [Pharmacy Med Name: LOSARTAN TAB 25MG] 90 tablet 3     Sig: TAKE 1 TABLET DAILY        Hypertensive Medications Protocol Passed Carie Ba, [address] [place] [address] Occupational Therapy     In 1 month Melanie Longoria  [facility] [place]-Physiatry EMG-bue    In 1 month [facility], [address] [place] [address] Occupational Therapy

## 2021-11-15 NOTE — TELEPHONE ENCOUNTER
Please review; RN unable to refill due to high interaction warning pop-up copied here:  Drug-Drug: sertraline and naproxen  Toxic effects may be increased with concurrent administration of NSAIDs and Selective Serotonin Reuptake Inhibitors.  The risk of upp week Nubia Castanon  [facility] / [phone]   xray results    In 2 weeks Hardik Boo, [facility] Oniel Ba PPO    In 3 weeks Hardik Boo, [facility]

## 2021-11-16 NOTE — TELEPHONE ENCOUNTER
Order sent to [facility] in [place]. Sue Romano.  Juliana Cameron MD, [facility]  Physical Medicine and Rehabilitation/Sports Medicine  MEDICAL CENTER [facility]

## 2021-11-26 ENCOUNTER — TELEMEDICINE (OUTPATIENT)
Dept: PHYSICAL MEDICINE AND REHAB | Facility: CLINIC | Age: 56
End: 2021-11-26
Payer: COMMERCIAL

## 2021-11-26 DIAGNOSIS — I10 ESSENTIAL HYPERTENSION: ICD-10-CM

## 2021-11-26 DIAGNOSIS — R20.0 NUMBNESS IN BOTH HANDS: Primary | ICD-10-CM

## 2021-11-26 DIAGNOSIS — M79.644 BILATERAL THUMB PAIN: ICD-10-CM

## 2021-11-26 DIAGNOSIS — M54.2 TRIGGER POINT OF NECK: ICD-10-CM

## 2021-11-26 DIAGNOSIS — M54.2 NECK PAIN: ICD-10-CM

## 2021-11-26 DIAGNOSIS — E78.5 HYPERLIPIDEMIA, UNSPECIFIED HYPERLIPIDEMIA TYPE: ICD-10-CM

## 2021-11-26 DIAGNOSIS — G56.03 BILATERAL CARPAL TUNNEL SYNDROME: ICD-10-CM

## 2021-11-26 DIAGNOSIS — E11.22 CONTROLLED TYPE 2 DIABETES MELLITUS WITH STAGE 3 CHRONIC KIDNEY DISEASE, WITHOUT LONG-TERM CURRENT USE OF INSULIN (HCC): ICD-10-CM

## 2021-11-26 DIAGNOSIS — K21.9 GASTROESOPHAGEAL REFLUX DISEASE, UNSPECIFIED WHETHER ESOPHAGITIS PRESENT: ICD-10-CM

## 2021-11-26 DIAGNOSIS — M79.645 BILATERAL THUMB PAIN: ICD-10-CM

## 2021-11-26 DIAGNOSIS — N18.30 CONTROLLED TYPE 2 DIABETES MELLITUS WITH STAGE 3 CHRONIC KIDNEY DISEASE, WITHOUT LONG-TERM CURRENT USE OF INSULIN (HCC): ICD-10-CM

## 2021-11-26 PROCEDURE — 99214 OFFICE O/P EST MOD 30 MIN: CPT | Performed by: PHYSICAL MEDICINE & REHABILITATION

## 2021-11-26 NOTE — PROGRESS NOTES
[address]  Video Visit Progress Note      Telehealth outside of [address] Verbal Consent   I conducted a telehealth visit with Willis Noble today, 11/26/21, which was completed using two-way, numbness and tingling affecting all digits.   She has been using the resting wrist splints and has also been using Naprosyn which has been beneficial.  She continues to have discomfort in her hands with numbness and tingling in her hands especially when hol losartan 25 MG Oral Tab Take 1 tablet (25 mg total) by mouth daily. 90 tablet 1   • sertraline 50 MG Oral Tab Take 1 tablet (50 mg total) by mouth daily.  90 tablet 1   • naproxen (NAPROSYN) 500 MG Oral Tab Take 1 tablet (500 mg total) by mouth 2 (two) time All other systems reviewed and are negative. Pertinent positives and negatives noted in the HPI. PHYSICAL EXAM:     There is no height or weight on file to calculate BMI.     General: No immediate distress  Head: Normocephalic/ Atraumatic  Eyes: Ex cannot be displayed here. • Reason for testing 10/23/2021 Screening   Final   • Gyn Additional Information 10/23/2021    Final                    Value: This result contains rich text formatting which cannot be displayed here.    • Case Report 10/23/2021 scoliotic curvature in the upper dorsal spine and   dextroscoliosis cervical thoracic junction. OBLIQUE VIEWS: Normal neuroforamina. Upright flexion extension views: Negative. Impression: CONCLUSION:   1.  No acute fracture or malalignm CONCLUSION:   1. No acute fracture dislocation.   Mild degeneration 1st carpal-metacarpal           Dictated by (CST): Christopher Dukes MD on 11/04/2021 at 5:30 PM       Finalized by (CST): Christopher Dukes MD on 11/04/2021 at 5:31 PM          XR WRIS use her resting wrist splint and begin occupational therapy which is going to start in December. I'm also recommending bilateral carpal tunnel corticosteroid injections under ultrasound guidance which we will schedule in the next couple of weeks.   It is o syndrome  Hyperlipidemia, unspecified hyperlipidemia type  Trigger point of neck  Gastroesophageal reflux disease, unspecified whether esophagitis present  Neck pain  Controlled type 2 diabetes mellitus with stage 3 chronic kidney disease, without long-ter

## 2021-11-26 NOTE — PATIENT INSTRUCTIONS
1) Continue using wrist splints at nightime  2) Continue plan for occupational therapy  3) My office will call you to schedule the BILATERAL carpal tunnel injections under ultrasound guidance once the procedure is approved by your insurance carrier.     4)

## 2021-11-29 ENCOUNTER — TELEPHONE (OUTPATIENT)
Dept: NEUROLOGY | Facility: CLINIC | Age: 56
End: 2021-11-29

## 2021-11-29 ENCOUNTER — TELEPHONE (OUTPATIENT)
Dept: PHYSICAL THERAPY | Facility: HOSPITAL | Age: 56
End: 2021-11-29

## 2021-11-29 NOTE — TELEPHONE ENCOUNTER
[employer] for authorization of approval for BILATERAL Carpal tunnel CSI under ultrasound guidance cpt codes 65660, [id number],  x 2. Authorization is not required. Transaction ID: [id number]. Will inform Nursing.

## 2021-11-30 ENCOUNTER — OFFICE VISIT (OUTPATIENT)
Dept: PHYSICAL MEDICINE AND REHAB | Facility: CLINIC | Age: 56
End: 2021-11-30
Payer: COMMERCIAL

## 2021-11-30 DIAGNOSIS — G56.03 BILATERAL CARPAL TUNNEL SYNDROME: ICD-10-CM

## 2021-11-30 DIAGNOSIS — R20.0 NUMBNESS IN BOTH HANDS: Primary | ICD-10-CM

## 2021-11-30 PROCEDURE — 20526 THER INJECTION CARP TUNNEL: CPT | Performed by: PHYSICAL MEDICINE & REHABILITATION

## 2021-11-30 PROCEDURE — 76942 ECHO GUIDE FOR BIOPSY: CPT | Performed by: PHYSICAL MEDICINE & REHABILITATION

## 2021-11-30 NOTE — PROCEDURES
[address]  Carpal Tunnel Injection Procedure Note    CHIEF COMPLAINT:  Patient presents with: Injection: Patient comes in for bilateral carpal tunnel CSI.  Rates pain on R 5/10 and L 4/10      PROCEDURE P for testing 10/23/2021 Screening   Final   • Gyn Additional Information 10/23/2021    Final                    Value: This result contains rich text formatting which cannot be displayed here.    • Case Report 10/23/2021    Final                    Value:Gyne obtained    The RIGHT wrist was prepped and draped in the standard sterile fashion using 3 sticks of Betadine. Using a linear high frequency probe, the RIGHT median nerve was identified in the carpal tunnel in the transverse plane.   The ulnar artery and ve

## 2021-12-03 ENCOUNTER — OFFICE VISIT (OUTPATIENT)
Dept: OCCUPATIONAL MEDICINE | Facility: HOSPITAL | Age: 56
End: 2021-12-03
Attending: PHYSICAL MEDICINE & REHABILITATION
Payer: COMMERCIAL

## 2021-12-03 DIAGNOSIS — E11.22 CONTROLLED TYPE 2 DIABETES MELLITUS WITH STAGE 3 CHRONIC KIDNEY DISEASE, WITHOUT LONG-TERM CURRENT USE OF INSULIN (HCC): ICD-10-CM

## 2021-12-03 DIAGNOSIS — E78.5 HYPERLIPIDEMIA, UNSPECIFIED HYPERLIPIDEMIA TYPE: ICD-10-CM

## 2021-12-03 DIAGNOSIS — M54.2 TRIGGER POINT OF NECK: ICD-10-CM

## 2021-12-03 DIAGNOSIS — M79.644 BILATERAL THUMB PAIN: ICD-10-CM

## 2021-12-03 DIAGNOSIS — M79.645 BILATERAL THUMB PAIN: ICD-10-CM

## 2021-12-03 DIAGNOSIS — R20.0 NUMBNESS IN BOTH HANDS: ICD-10-CM

## 2021-12-03 DIAGNOSIS — K21.9 GASTROESOPHAGEAL REFLUX DISEASE, UNSPECIFIED WHETHER ESOPHAGITIS PRESENT: ICD-10-CM

## 2021-12-03 DIAGNOSIS — M54.2 NECK PAIN: ICD-10-CM

## 2021-12-03 DIAGNOSIS — N18.30 CONTROLLED TYPE 2 DIABETES MELLITUS WITH STAGE 3 CHRONIC KIDNEY DISEASE, WITHOUT LONG-TERM CURRENT USE OF INSULIN (HCC): ICD-10-CM

## 2021-12-03 DIAGNOSIS — I10 ESSENTIAL HYPERTENSION: ICD-10-CM

## 2021-12-03 DIAGNOSIS — G56.03 BILATERAL CARPAL TUNNEL SYNDROME: ICD-10-CM

## 2021-12-03 PROCEDURE — 97165 OT EVAL LOW COMPLEX 30 MIN: CPT | Performed by: OCCUPATIONAL THERAPIST

## 2021-12-03 NOTE — PROGRESS NOTES
OCCUPATIONAL THERAPY UPPER EXTREMITY EVALUATION:   Referring Physician: Dr. Lj Shukla  Date of onset:June,2021  Diagnosis: Numbness in both hands (R20.0)  Bilateral thumb pain (M79.644,M79.645)  Bilateral carpal tunnel syndrome (G56.03)   Date of Service deficit was found in decreased bilateral  strength. Given the above noted deficits in  pain and strength, patient presents with impairments in occupation- based task performance for self care skills, and leisure skills.   Nita could benefit from [employer] Modalities                                                                   Charges: OT Eval x1     Total Timed Treatment: 0 min     Total Treatment Time: 45 min       PLAN OF CARE:   Goals:      Pt complaints of pain in right

## 2021-12-06 ENCOUNTER — APPOINTMENT (OUTPATIENT)
Dept: OCCUPATIONAL MEDICINE | Facility: HOSPITAL | Age: 56
End: 2021-12-06
Attending: PHYSICAL MEDICINE & REHABILITATION
Payer: COMMERCIAL

## 2021-12-07 ENCOUNTER — APPOINTMENT (OUTPATIENT)
Dept: OCCUPATIONAL MEDICINE | Facility: HOSPITAL | Age: 56
End: 2021-12-07
Attending: PHYSICAL MEDICINE & REHABILITATION
Payer: COMMERCIAL

## 2021-12-10 ENCOUNTER — MED REC SCAN ONLY (OUTPATIENT)
Dept: NEUROLOGY | Facility: CLINIC | Age: 56
End: 2021-12-10

## 2021-12-10 ENCOUNTER — APPOINTMENT (OUTPATIENT)
Dept: OCCUPATIONAL MEDICINE | Facility: HOSPITAL | Age: 56
End: 2021-12-10
Attending: PHYSICAL MEDICINE & REHABILITATION
Payer: COMMERCIAL

## 2021-12-15 ENCOUNTER — APPOINTMENT (OUTPATIENT)
Dept: OCCUPATIONAL MEDICINE | Facility: HOSPITAL | Age: 56
End: 2021-12-15
Attending: PHYSICAL MEDICINE & REHABILITATION
Payer: COMMERCIAL

## 2021-12-17 ENCOUNTER — PROCEDURE VISIT (OUTPATIENT)
Dept: PHYSICAL MEDICINE AND REHAB | Facility: CLINIC | Age: 56
End: 2021-12-17
Payer: COMMERCIAL

## 2021-12-17 ENCOUNTER — APPOINTMENT (OUTPATIENT)
Dept: OCCUPATIONAL MEDICINE | Facility: HOSPITAL | Age: 56
End: 2021-12-17
Attending: PHYSICAL MEDICINE & REHABILITATION
Payer: COMMERCIAL

## 2021-12-17 DIAGNOSIS — G56.03 BILATERAL CARPAL TUNNEL SYNDROME: ICD-10-CM

## 2021-12-17 DIAGNOSIS — M54.2 TRIGGER POINT OF NECK: ICD-10-CM

## 2021-12-17 DIAGNOSIS — E11.22 CONTROLLED TYPE 2 DIABETES MELLITUS WITH STAGE 3 CHRONIC KIDNEY DISEASE, WITHOUT LONG-TERM CURRENT USE OF INSULIN (HCC): ICD-10-CM

## 2021-12-17 DIAGNOSIS — M79.644 BILATERAL THUMB PAIN: ICD-10-CM

## 2021-12-17 DIAGNOSIS — E78.5 HYPERLIPIDEMIA, UNSPECIFIED HYPERLIPIDEMIA TYPE: ICD-10-CM

## 2021-12-17 DIAGNOSIS — R20.0 NUMBNESS IN BOTH HANDS: Primary | ICD-10-CM

## 2021-12-17 DIAGNOSIS — M54.2 NECK PAIN: ICD-10-CM

## 2021-12-17 DIAGNOSIS — N18.30 CONTROLLED TYPE 2 DIABETES MELLITUS WITH STAGE 3 CHRONIC KIDNEY DISEASE, WITHOUT LONG-TERM CURRENT USE OF INSULIN (HCC): ICD-10-CM

## 2021-12-17 DIAGNOSIS — K21.9 GASTROESOPHAGEAL REFLUX DISEASE, UNSPECIFIED WHETHER ESOPHAGITIS PRESENT: ICD-10-CM

## 2021-12-17 DIAGNOSIS — I10 ESSENTIAL HYPERTENSION: ICD-10-CM

## 2021-12-17 DIAGNOSIS — M79.645 BILATERAL THUMB PAIN: ICD-10-CM

## 2021-12-17 PROCEDURE — 95913 NRV CNDJ TEST 13/> STUDIES: CPT | Performed by: PHYSICAL MEDICINE & REHABILITATION

## 2021-12-17 PROCEDURE — 95886 MUSC TEST DONE W/N TEST COMP: CPT | Performed by: PHYSICAL MEDICINE & REHABILITATION

## 2021-12-17 NOTE — PROCEDURES
[address]   Nerve Conduction Study and Electromyography Procedure Note      Patient: Connie Beckwith Age: 60 Years 8 Months  Patient ID: [id number] Refer Physician: Apollo Dumont  Sex: Female Complaint: EMG BUE Sites Distance Segments Latency Amplitude Velocity Amp. 1-2 Peak Dur. Area    cm  ms mV m/s % ms mVms   R MEDIAN - APB      Wrist 8 Wrist - APB 2.85 11.2  100 5.90 34.9      Ref.   Ref. 4.40 4.0          Elbow 18 Elbow - Wrist 6.35 10.4 51.4 93 5.75 30.7 None N N N N   R. ABD POLL BREVIS N None None None None N N N N         Summary    Sensory Nerve Conduction Studies   • BILATERAL radial nerve conduction study to the thumb demonstrates a normal latency and amplitude.   • BILATERAL median nerve conduction s Rehabilitation/Sports Liini 22

## 2021-12-18 RX ORDER — FAMOTIDINE 20 MG/1
TABLET, FILM COATED ORAL
Qty: 180 TABLET | Refills: 1 | OUTPATIENT
Start: 2021-12-18

## 2021-12-20 ENCOUNTER — TELEPHONE (OUTPATIENT)
Dept: NEUROLOGY | Facility: CLINIC | Age: 56
End: 2021-12-20

## 2021-12-20 ENCOUNTER — OFFICE VISIT (OUTPATIENT)
Dept: PHYSICAL MEDICINE AND REHAB | Facility: CLINIC | Age: 56
End: 2021-12-20
Payer: COMMERCIAL

## 2021-12-20 ENCOUNTER — APPOINTMENT (OUTPATIENT)
Dept: OCCUPATIONAL MEDICINE | Facility: HOSPITAL | Age: 56
End: 2021-12-20
Attending: PHYSICAL MEDICINE & REHABILITATION
Payer: COMMERCIAL

## 2021-12-20 VITALS — OXYGEN SATURATION: 98 % | WEIGHT: 186 LBS | HEIGHT: 63 IN | HEART RATE: 77 BPM | BODY MASS INDEX: 32.96 KG/M2

## 2021-12-20 DIAGNOSIS — M25.78 DEGENERATION OF INTERVERTEBRAL DISC OF CERVICAL REGION WITH OSTEOPHYTE OF CERVICAL VERTEBRA: ICD-10-CM

## 2021-12-20 DIAGNOSIS — M79.645 BILATERAL THUMB PAIN: ICD-10-CM

## 2021-12-20 DIAGNOSIS — M54.2 TRIGGER POINT OF NECK: ICD-10-CM

## 2021-12-20 DIAGNOSIS — G56.03 BILATERAL CARPAL TUNNEL SYNDROME: ICD-10-CM

## 2021-12-20 DIAGNOSIS — M54.12 CERVICAL RADICULOPATHY: ICD-10-CM

## 2021-12-20 DIAGNOSIS — M50.30 DDD (DEGENERATIVE DISC DISEASE), CERVICAL: ICD-10-CM

## 2021-12-20 DIAGNOSIS — R20.0 NUMBNESS IN BOTH HANDS: ICD-10-CM

## 2021-12-20 DIAGNOSIS — M79.644 BILATERAL THUMB PAIN: ICD-10-CM

## 2021-12-20 DIAGNOSIS — M54.2 NECK PAIN: Primary | ICD-10-CM

## 2021-12-20 DIAGNOSIS — N18.30 CONTROLLED TYPE 2 DIABETES MELLITUS WITH STAGE 3 CHRONIC KIDNEY DISEASE, WITHOUT LONG-TERM CURRENT USE OF INSULIN (HCC): ICD-10-CM

## 2021-12-20 DIAGNOSIS — E11.22 CONTROLLED TYPE 2 DIABETES MELLITUS WITH STAGE 3 CHRONIC KIDNEY DISEASE, WITHOUT LONG-TERM CURRENT USE OF INSULIN (HCC): ICD-10-CM

## 2021-12-20 DIAGNOSIS — M48.02 FORAMINAL STENOSIS OF CERVICAL REGION: ICD-10-CM

## 2021-12-20 DIAGNOSIS — E78.5 HYPERLIPIDEMIA, UNSPECIFIED HYPERLIPIDEMIA TYPE: ICD-10-CM

## 2021-12-20 DIAGNOSIS — K21.9 GASTROESOPHAGEAL REFLUX DISEASE, UNSPECIFIED WHETHER ESOPHAGITIS PRESENT: ICD-10-CM

## 2021-12-20 DIAGNOSIS — M50.30 DEGENERATION OF INTERVERTEBRAL DISC OF CERVICAL REGION WITH OSTEOPHYTE OF CERVICAL VERTEBRA: ICD-10-CM

## 2021-12-20 PROCEDURE — 99214 OFFICE O/P EST MOD 30 MIN: CPT | Performed by: PHYSICAL MEDICINE & REHABILITATION

## 2021-12-20 PROCEDURE — 3008F BODY MASS INDEX DOCD: CPT | Performed by: PHYSICAL MEDICINE & REHABILITATION

## 2021-12-20 NOTE — PROGRESS NOTES
[address]  Progress Note    CHIEF COMPLAINT:  Patient presents with: Follow - Up: LOV: Rebecca. Carpal Tunnel CSI. (11/30/21) EMG-BUE (12/17/21) States having 80% relief from injections.  No new or worsening Never Used      Tobacco comment: quit 40 years ago     Vaping Use      Vaping Use: Never used    Substance and Sexual Activity      Alcohol use:  Yes        Alcohol/week: 0.0 standard drinks        Comment: (2drinks) weekly/ social      Drug use: No      Se INJECT 0.25MG SUBCUTANEOUSLY EVERY 7 DAYS FOR 28 DAYS, THEN 0.5MG WEEKLY AFTER THE FIRST MONTH 4.5 mL 0   • Pantoprazole Sodium 40 MG Oral Tab EC Take 1 tablet (40 mg total) by mouth every morning.  90 tablet 1   • loratadine 10 MG Oral Tab      • multivita or malignancy  Negative for intraepithelial lesion or malignancy Final   • Specimen Adequacy 10/23/2021 Satisfactory for evaluation. Endocervical or metaplastic cells may not be distinguished in cases of atrophy.    Final   • General Categorization 10/23/2 Expiration Date 07/20/2021 02/28/2023  Date Final   ]      Radiology Imaging:  I reviewed with the patient her X-ray of the cervical spine from 11/4/2021  XR CERVICAL SPINE (4VIEWS) (CPT=72050)  Narrative: PROCEDURE: XR CERVICAL SPINE (4 OR 5 VIEWS) (CPT=7 OTHER: Negative. Impression: CONCLUSION: Normal examination. No acute fracture dislocation.            Dictated by (CST): Josse Lester MD on 11/04/2021 at 5:31 PM       Finalized by (CST): Josse Lester MD on 11/04/2021 at 5:32 Health, XR WRIST COMPLETE (MIN 3 VIEWS), LEFT (CPT=73110), 11/04/2021, 11:13 AM.     INDICATIONS: Chronic bilateral wrist pain. TECHNIQUE: 3 views were obtained.        FINDINGS:   BONES: Normal. No significant arthropathy, fracture or acute abnormality Instructions were provided as documented in AVS summary. The patient was in agreement with the assessment and plan. All questions were answered. There were no barriers to learning.        Neck pain  (primary encounter diagnosis)  Bilateral thumb pain  Nu

## 2021-12-20 NOTE — TELEPHONE ENCOUNTER
[employer] for authorization of approval for MRI C-spine wo cpt code 45171. Authorization is not required. Transaction ID: [id number]. Pt. Informed. Transferred call to scheduling for appt.

## 2021-12-21 ENCOUNTER — HOSPITAL ENCOUNTER (OUTPATIENT)
Dept: MRI IMAGING | Facility: HOSPITAL | Age: 56
Discharge: HOME | End: 2021-12-21
Attending: PHYSICAL MEDICINE & REHABILITATION
Payer: COMMERCIAL

## 2021-12-21 DIAGNOSIS — M48.02 FORAMINAL STENOSIS OF CERVICAL REGION: ICD-10-CM

## 2021-12-21 DIAGNOSIS — R20.0 NUMBNESS IN BOTH HANDS: ICD-10-CM

## 2021-12-21 DIAGNOSIS — M54.2 NECK PAIN: ICD-10-CM

## 2021-12-21 DIAGNOSIS — M79.644 BILATERAL THUMB PAIN: ICD-10-CM

## 2021-12-21 DIAGNOSIS — M54.12 CERVICAL RADICULOPATHY: ICD-10-CM

## 2021-12-21 DIAGNOSIS — G56.03 BILATERAL CARPAL TUNNEL SYNDROME: ICD-10-CM

## 2021-12-21 DIAGNOSIS — N18.30 CONTROLLED TYPE 2 DIABETES MELLITUS WITH STAGE 3 CHRONIC KIDNEY DISEASE, WITHOUT LONG-TERM CURRENT USE OF INSULIN (HCC): ICD-10-CM

## 2021-12-21 DIAGNOSIS — E78.5 HYPERLIPIDEMIA, UNSPECIFIED HYPERLIPIDEMIA TYPE: ICD-10-CM

## 2021-12-21 DIAGNOSIS — M79.645 BILATERAL THUMB PAIN: ICD-10-CM

## 2021-12-21 DIAGNOSIS — M50.30 DEGENERATION OF INTERVERTEBRAL DISC OF CERVICAL REGION WITH OSTEOPHYTE OF CERVICAL VERTEBRA: ICD-10-CM

## 2021-12-21 DIAGNOSIS — E11.22 CONTROLLED TYPE 2 DIABETES MELLITUS WITH STAGE 3 CHRONIC KIDNEY DISEASE, WITHOUT LONG-TERM CURRENT USE OF INSULIN (HCC): ICD-10-CM

## 2021-12-21 DIAGNOSIS — K21.9 GASTROESOPHAGEAL REFLUX DISEASE, UNSPECIFIED WHETHER ESOPHAGITIS PRESENT: ICD-10-CM

## 2021-12-21 DIAGNOSIS — M50.30 DDD (DEGENERATIVE DISC DISEASE), CERVICAL: ICD-10-CM

## 2021-12-21 DIAGNOSIS — M54.2 TRIGGER POINT OF NECK: ICD-10-CM

## 2021-12-21 DIAGNOSIS — M25.78 DEGENERATION OF INTERVERTEBRAL DISC OF CERVICAL REGION WITH OSTEOPHYTE OF CERVICAL VERTEBRA: ICD-10-CM

## 2021-12-21 PROCEDURE — 72141 MRI NECK SPINE W/O DYE: CPT | Performed by: PHYSICAL MEDICINE & REHABILITATION

## 2021-12-22 ENCOUNTER — APPOINTMENT (OUTPATIENT)
Dept: OCCUPATIONAL MEDICINE | Facility: HOSPITAL | Age: 56
End: 2021-12-22
Attending: PHYSICAL MEDICINE & REHABILITATION
Payer: COMMERCIAL

## 2021-12-23 ENCOUNTER — OFFICE VISIT (OUTPATIENT)
Dept: OCCUPATIONAL MEDICINE | Facility: HOSPITAL | Age: 56
End: 2021-12-23
Attending: PHYSICAL MEDICINE & REHABILITATION
Payer: COMMERCIAL

## 2021-12-23 PROCEDURE — 97140 MANUAL THERAPY 1/> REGIONS: CPT | Performed by: OCCUPATIONAL THERAPIST

## 2021-12-23 PROCEDURE — 97110 THERAPEUTIC EXERCISES: CPT | Performed by: OCCUPATIONAL THERAPIST

## 2021-12-23 RX ORDER — METFORMIN HYDROCHLORIDE 500 MG/1
TABLET, EXTENDED RELEASE ORAL
Qty: 90 TABLET | Refills: 1 | Status: SHIPPED | OUTPATIENT
Start: 2021-12-23

## 2021-12-23 NOTE — PROGRESS NOTES
Dx:  Numbness in both hands (R20.0)  Bilateral thumb pain (M79.644,M79.645)  Bilateral carpal tunnel syndrome (G56.03)          Authorized # of Visits:  6         Next MD visit: none scheduled  Fall Risk: standard         Precautions: Diabetes   [place] in right  strength to at least 35 lbs and left to 28 lbs for ease in opening jar. Plan: Continue with POC. Patient to schedule more visits next week.       Charges: MT2,TE      Total Timed Treatment: 40 min  Total Treatment Time: 45 min

## 2021-12-28 ENCOUNTER — APPOINTMENT (OUTPATIENT)
Dept: OCCUPATIONAL MEDICINE | Facility: HOSPITAL | Age: 56
End: 2021-12-28
Attending: PHYSICAL MEDICINE & REHABILITATION
Payer: COMMERCIAL

## 2021-12-28 ENCOUNTER — TELEPHONE (OUTPATIENT)
Dept: OCCUPATIONAL MEDICINE | Facility: HOSPITAL | Age: 56
End: 2021-12-28

## 2021-12-30 ENCOUNTER — TELEMEDICINE (OUTPATIENT)
Dept: PHYSICAL MEDICINE AND REHAB | Facility: CLINIC | Age: 56
End: 2021-12-30

## 2021-12-30 DIAGNOSIS — E11.22 CONTROLLED TYPE 2 DIABETES MELLITUS WITH STAGE 3 CHRONIC KIDNEY DISEASE, WITHOUT LONG-TERM CURRENT USE OF INSULIN (HCC): ICD-10-CM

## 2021-12-30 DIAGNOSIS — R20.0 NUMBNESS IN BOTH HANDS: ICD-10-CM

## 2021-12-30 DIAGNOSIS — M54.2 TRIGGER POINT OF NECK: ICD-10-CM

## 2021-12-30 DIAGNOSIS — K21.9 GASTROESOPHAGEAL REFLUX DISEASE, UNSPECIFIED WHETHER ESOPHAGITIS PRESENT: ICD-10-CM

## 2021-12-30 DIAGNOSIS — M79.645 BILATERAL THUMB PAIN: ICD-10-CM

## 2021-12-30 DIAGNOSIS — M54.12 CERVICAL RADICULOPATHY: ICD-10-CM

## 2021-12-30 DIAGNOSIS — M25.78 DEGENERATION OF INTERVERTEBRAL DISC OF CERVICAL REGION WITH OSTEOPHYTE OF CERVICAL VERTEBRA: ICD-10-CM

## 2021-12-30 DIAGNOSIS — N18.30 CONTROLLED TYPE 2 DIABETES MELLITUS WITH STAGE 3 CHRONIC KIDNEY DISEASE, WITHOUT LONG-TERM CURRENT USE OF INSULIN (HCC): ICD-10-CM

## 2021-12-30 DIAGNOSIS — I10 ESSENTIAL HYPERTENSION: ICD-10-CM

## 2021-12-30 DIAGNOSIS — E78.5 HYPERLIPIDEMIA, UNSPECIFIED HYPERLIPIDEMIA TYPE: ICD-10-CM

## 2021-12-30 DIAGNOSIS — M48.02 FORAMINAL STENOSIS OF CERVICAL REGION: ICD-10-CM

## 2021-12-30 DIAGNOSIS — M47.812 CERVICAL FACET SYNDROME: ICD-10-CM

## 2021-12-30 DIAGNOSIS — M50.30 DDD (DEGENERATIVE DISC DISEASE), CERVICAL: ICD-10-CM

## 2021-12-30 DIAGNOSIS — M50.30 DEGENERATION OF INTERVERTEBRAL DISC OF CERVICAL REGION WITH OSTEOPHYTE OF CERVICAL VERTEBRA: ICD-10-CM

## 2021-12-30 DIAGNOSIS — G56.03 BILATERAL CARPAL TUNNEL SYNDROME: ICD-10-CM

## 2021-12-30 DIAGNOSIS — M54.2 NECK PAIN: Primary | ICD-10-CM

## 2021-12-30 DIAGNOSIS — M79.644 BILATERAL THUMB PAIN: ICD-10-CM

## 2021-12-30 PROCEDURE — 99214 OFFICE O/P EST MOD 30 MIN: CPT | Performed by: PHYSICAL MEDICINE & REHABILITATION

## 2021-12-30 NOTE — PROGRESS NOTES
[address]  Video Visit Progress Note      Telehealth outside of [address] Verbal Consent   I conducted a telehealth visit with Shiva Johnson today, 12/30/21, which was completed using two-way, numbness and tingling affecting all digits.   Status post bilateral carpal tunnel corticosteroid injection on November 30, 2021 with about 80% improvement in her symptoms. She also had an EMG performed on December 17 which demonstrated bilateral C5 chronic Current Outpatient Medications   Medication Sig Dispense Refill   • METFORMIN HCL  MG Oral Tablet 24 Hr TAKE 1 TABLET DAILY WITH   BREAKFAST 90 tablet 1   • losartan 25 MG Oral Tab Take 1 tablet (25 mg total) by mouth daily.  90 tablet 1   • sertral HPI  Skin: Negative. Neurological: As per HPI  Endo/Heme/Allergies: Negative. Psychiatric/Behavioral: Negative. All other systems reviewed and are negative. Pertinent positives and negatives noted in the HPI.         PHYSICAL EXAM:     There is n here.   • Clinical Information 10/23/2021    Final                    Value: This result contains rich text formatting which cannot be displayed here.    • Reason for testing 10/23/2021 Screening   Final   • Gyn Additional Information 10/23/2021    Final grossly normal signal and morphology. OTHER: Negative. CERVICAL DISC LEVELS:  C2-C3: Right paracentral disc bulge/osteophyte complex.   There is mild narrowing the central canal  C3-C4: Broad-based disc bulge/osteophyte complex slightly asymmetric to t spine performed which I independently reviewed with her today. She does have multilevel degenerative changes most notably at C4-C5, C5-C6, and C6-C7 with neuroforamina narrowing as well.  Given that her symptoms have improved after the carpal tunnel injecti 2 diabetes mellitus with stage 3 chronic kidney disease, without long-term current use of insulin (HCC)  Cervical radiculopathy  Essential hypertension  DDD (degenerative disc disease), cervical  Hyperlipidemia, unspecified hyperlipidemia type  Bilateral t

## 2021-12-30 NOTE — PATIENT INSTRUCTIONS
1) Continue with home exercise program  2) Continue Naprosyn 500 mg as needed for pain  3) Follow up with me if needed.  If symptoms reoccur, then would recommend repeat carpal tunnel injections vs C7-T1 ILESI

## 2022-01-02 LAB — AMB EXT COVID-19 RESULT: DETECTED

## 2022-01-13 ENCOUNTER — LAB ENCOUNTER (OUTPATIENT)
Dept: LAB | Age: 57
End: 2022-01-13
Attending: INTERNAL MEDICINE
Payer: COMMERCIAL

## 2022-01-13 DIAGNOSIS — E11.22 CONTROLLED TYPE 2 DIABETES MELLITUS WITH STAGE 3 CHRONIC KIDNEY DISEASE, WITHOUT LONG-TERM CURRENT USE OF INSULIN (HCC): ICD-10-CM

## 2022-01-13 DIAGNOSIS — N18.30 CONTROLLED TYPE 2 DIABETES MELLITUS WITH STAGE 3 CHRONIC KIDNEY DISEASE, WITHOUT LONG-TERM CURRENT USE OF INSULIN (HCC): ICD-10-CM

## 2022-01-13 LAB
CHOLEST SERPL-MCNC: 168 MG/DL (ref ?–200)
FASTING PATIENT LIPID ANSWER: YES
HDLC SERPL-MCNC: 65 MG/DL (ref 40–59)
LDLC SERPL CALC-MCNC: 91 MG/DL (ref ?–100)
NONHDLC SERPL-MCNC: 103 MG/DL (ref ?–130)
TRIGL SERPL-MCNC: 61 MG/DL (ref 30–149)
VLDLC SERPL CALC-MCNC: 10 MG/DL (ref 0–30)

## 2022-01-13 PROCEDURE — 36415 COLL VENOUS BLD VENIPUNCTURE: CPT

## 2022-01-13 PROCEDURE — 80061 LIPID PANEL: CPT

## 2022-01-20 ENCOUNTER — OFFICE VISIT (OUTPATIENT)
Dept: INTERNAL MEDICINE CLINIC | Facility: CLINIC | Age: 57
End: 2022-01-20
Payer: COMMERCIAL

## 2022-01-20 VITALS
DIASTOLIC BLOOD PRESSURE: 78 MMHG | TEMPERATURE: 98 F | WEIGHT: 176.88 LBS | SYSTOLIC BLOOD PRESSURE: 118 MMHG | HEART RATE: 77 BPM | HEIGHT: 63 IN | BODY MASS INDEX: 31.34 KG/M2

## 2022-01-20 DIAGNOSIS — E78.00 HYPERCHOLESTEROLEMIA: ICD-10-CM

## 2022-01-20 DIAGNOSIS — N18.30 CONTROLLED TYPE 2 DIABETES MELLITUS WITH STAGE 3 CHRONIC KIDNEY DISEASE, WITHOUT LONG-TERM CURRENT USE OF INSULIN (HCC): Primary | ICD-10-CM

## 2022-01-20 DIAGNOSIS — Z00.00 ROUTINE HEALTH MAINTENANCE: ICD-10-CM

## 2022-01-20 DIAGNOSIS — E11.22 CONTROLLED TYPE 2 DIABETES MELLITUS WITH STAGE 3 CHRONIC KIDNEY DISEASE, WITHOUT LONG-TERM CURRENT USE OF INSULIN (HCC): Primary | ICD-10-CM

## 2022-01-20 DIAGNOSIS — K21.9 GASTROESOPHAGEAL REFLUX DISEASE: ICD-10-CM

## 2022-01-20 LAB
CARTRIDGE LOT#: 867 NUMERIC
HEMOGLOBIN A1C: 6.4 % (ref 4.3–5.6)

## 2022-01-20 PROCEDURE — 3008F BODY MASS INDEX DOCD: CPT | Performed by: INTERNAL MEDICINE

## 2022-01-20 PROCEDURE — 3078F DIAST BP <80 MM HG: CPT | Performed by: INTERNAL MEDICINE

## 2022-01-20 PROCEDURE — 83036 HEMOGLOBIN GLYCOSYLATED A1C: CPT | Performed by: INTERNAL MEDICINE

## 2022-01-20 PROCEDURE — 3074F SYST BP LT 130 MM HG: CPT | Performed by: INTERNAL MEDICINE

## 2022-01-20 PROCEDURE — 99214 OFFICE O/P EST MOD 30 MIN: CPT | Performed by: INTERNAL MEDICINE

## 2022-01-20 RX ORDER — SEMAGLUTIDE 1.34 MG/ML
0.5 INJECTION, SOLUTION SUBCUTANEOUS WEEKLY
Qty: 4.5 ML | Refills: 1 | Status: SHIPPED | OUTPATIENT
Start: 2022-01-20

## 2022-01-20 RX ORDER — PANTOPRAZOLE SODIUM 40 MG/1
40 TABLET, DELAYED RELEASE ORAL EVERY MORNING
Qty: 90 TABLET | Refills: 1 | Status: SHIPPED | OUTPATIENT
Start: 2022-01-20

## 2022-01-20 NOTE — ASSESSMENT & PLAN NOTE
A1c is 6.4 today. Patient has lost weight with diet and exercise. Continue current medications and check complete labs in April.

## 2022-01-20 NOTE — PROGRESS NOTES
HPI:    Patient ID: Ivory Rangel is a 60year old female. HPI:  Pt has lost 10 pounds in the past month. She saw Dr. Nathan Early and had injections in her wrist which helped. She had an MRI of her neck which showed bulging discs.   Pt had COVID on Janu Vaping Use: Never used    Alcohol use:  Yes      Alcohol/week: 0.0 standard drinks      Comment: (2drinks) weekly/ social    Drug use: No    Family History   Problem Relation Age of Onset   • Cancer Father         bladder   • Hypertension Father    • Efrain Gastroesophageal reflux disease     Controlled. Continue present management. Relevant Medications    pantoprazole 40 MG Oral Tab EC    Routine health maintenance     Labs are due in April.          Relevant Orders    CBC WITH DIFFERENTIAL WITH PLAT

## 2022-01-20 NOTE — PATIENT INSTRUCTIONS
Please schedule your next appointment in April for a follow-up. Please do your blood tests 2-5 days prior to your appointment with me.   You do not need to fast.     Please do not take vitamins or supplements for 3 days prior to the blood test.

## 2022-04-18 DIAGNOSIS — E78.00 HYPERCHOLESTEROLEMIA: ICD-10-CM

## 2022-04-18 RX ORDER — ATORVASTATIN CALCIUM 10 MG/1
10 TABLET, FILM COATED ORAL DAILY
Qty: 90 TABLET | Refills: 1 | Status: SHIPPED | OUTPATIENT
Start: 2022-04-18 | End: 2022-10-08

## 2022-04-18 NOTE — TELEPHONE ENCOUNTER
Refill passed per [facility] protocol.      Requested Prescriptions   Pending Prescriptions Disp Refills    ATORVASTATIN 10 MG Oral Tab [Pharmacy Med Name: ATORVASTATIN TAB 10MG] 90 tablet 1     Sig: TAKE 1 TABLET DAILY        Cholesterol Medication Protocol Passed - 4/18/2022  1:06 AM        Passed - ALT in past 12 months        Passed - LDL in past 12 months        Passed - Last ALT < 80       Lab Results   Component Value Date    ALT 26 04/29/2021             Passed - Last LDL < 130     Lab Results   Component Value Date    LDL 91 01/13/2022               Passed - Appointment in past 12 or next 3 months                Recent Outpatient Visits              2 months ago Controlled type 2 diabetes mellitus with stage 3 chronic kidney disease, without long-term current use of insulin [facility])    [facility], [address], Melissa Ambrose MD    Office Visit    3 months ago Neck pain    [facility] Uri Garcia MD    Telemedicine    3 months ago     [facility], Malik CROCKETT, OT    Office Visit    3 months ago Neck pain    [facility], [facility]    Office Visit    4 months ago Numbness in both [address], Malik CROCKETT, OT    Office Visit             Future Appointments         Provider Department Appt Notes    In 1 week Ave Crabtree MD [place], [facility], [address], [address] Blood work results

## 2022-04-20 ENCOUNTER — LAB ENCOUNTER (OUTPATIENT)
Dept: LAB | Age: 57
End: 2022-04-20
Attending: INTERNAL MEDICINE
Payer: COMMERCIAL

## 2022-04-20 DIAGNOSIS — E11.22 CONTROLLED TYPE 2 DIABETES MELLITUS WITH STAGE 3 CHRONIC KIDNEY DISEASE, WITHOUT LONG-TERM CURRENT USE OF INSULIN (HCC): ICD-10-CM

## 2022-04-20 DIAGNOSIS — N18.30 CONTROLLED TYPE 2 DIABETES MELLITUS WITH STAGE 3 CHRONIC KIDNEY DISEASE, WITHOUT LONG-TERM CURRENT USE OF INSULIN (HCC): ICD-10-CM

## 2022-04-20 DIAGNOSIS — Z00.00 ROUTINE HEALTH MAINTENANCE: ICD-10-CM

## 2022-04-20 LAB
ALBUMIN SERPL-MCNC: 3.9 G/DL (ref 3.4–5)
ALBUMIN/GLOB SERPL: 1.3 {RATIO} (ref 1–2)
ALP LIVER SERPL-CCNC: 99 U/L
ALT SERPL-CCNC: 23 U/L
ANION GAP SERPL CALC-SCNC: 3 MMOL/L (ref 0–18)
AST SERPL-CCNC: 12 U/L (ref 15–37)
BASOPHILS # BLD AUTO: 0.03 X10(3) UL (ref 0–0.2)
BASOPHILS NFR BLD AUTO: 0.3 %
BILIRUB SERPL-MCNC: 0.6 MG/DL (ref 0.1–2)
BUN BLD-MCNC: 11 MG/DL (ref 7–18)
BUN/CREAT SERPL: 11 (ref 10–20)
CALCIUM BLD-MCNC: 9.6 MG/DL (ref 8.5–10.1)
CHLORIDE SERPL-SCNC: 107 MMOL/L (ref 98–112)
CHOLEST SERPL-MCNC: 164 MG/DL (ref ?–200)
CO2 SERPL-SCNC: 31 MMOL/L (ref 21–32)
CREAT BLD-MCNC: 1 MG/DL
CREAT UR-SCNC: 86.5 MG/DL
DEPRECATED RDW RBC AUTO: 40.5 FL (ref 35.1–46.3)
EOSINOPHIL # BLD AUTO: 0.17 X10(3) UL (ref 0–0.7)
EOSINOPHIL NFR BLD AUTO: 1.9 %
ERYTHROCYTE [DISTWIDTH] IN BLOOD BY AUTOMATED COUNT: 12 % (ref 11–15)
EST. AVERAGE GLUCOSE BLD GHB EST-MCNC: 137 MG/DL (ref 68–126)
FASTING PATIENT LIPID ANSWER: YES
FASTING STATUS PATIENT QL REPORTED: YES
GLOBULIN PLAS-MCNC: 3 G/DL (ref 2.8–4.4)
GLUCOSE BLD-MCNC: 96 MG/DL (ref 70–99)
HBA1C MFR BLD: 6.4 % (ref ?–5.7)
HCT VFR BLD AUTO: 38.6 %
HDLC SERPL-MCNC: 63 MG/DL (ref 40–59)
HGB BLD-MCNC: 12.1 G/DL
IMM GRANULOCYTES # BLD AUTO: 0.03 X10(3) UL (ref 0–1)
IMM GRANULOCYTES NFR BLD: 0.3 %
LDLC SERPL CALC-MCNC: 82 MG/DL (ref ?–100)
LYMPHOCYTES # BLD AUTO: 3.16 X10(3) UL (ref 1–4)
LYMPHOCYTES NFR BLD AUTO: 35 %
MCH RBC QN AUTO: 28.9 PG (ref 26–34)
MCHC RBC AUTO-ENTMCNC: 31.3 G/DL (ref 31–37)
MCV RBC AUTO: 92.3 FL
MICROALBUMIN UR-MCNC: 0.54 MG/DL
MICROALBUMIN/CREAT 24H UR-RTO: 6.2 UG/MG (ref ?–30)
MONOCYTES # BLD AUTO: 0.61 X10(3) UL (ref 0.1–1)
MONOCYTES NFR BLD AUTO: 6.8 %
NEUTROPHILS # BLD AUTO: 5.02 X10 (3) UL (ref 1.5–7.7)
NEUTROPHILS # BLD AUTO: 5.02 X10(3) UL (ref 1.5–7.7)
NEUTROPHILS NFR BLD AUTO: 55.7 %
NONHDLC SERPL-MCNC: 101 MG/DL (ref ?–130)
OSMOLALITY SERPL CALC.SUM OF ELEC: 291 MOSM/KG (ref 275–295)
PLATELET # BLD AUTO: 273 10(3)UL (ref 150–450)
POTASSIUM SERPL-SCNC: 5.1 MMOL/L (ref 3.5–5.1)
PROT SERPL-MCNC: 6.9 G/DL (ref 6.4–8.2)
RBC # BLD AUTO: 4.18 X10(6)UL
SODIUM SERPL-SCNC: 141 MMOL/L (ref 136–145)
TRIGL SERPL-MCNC: 109 MG/DL (ref 30–149)
VIT B12 SERPL-MCNC: 746 PG/ML (ref 193–986)
VLDLC SERPL CALC-MCNC: 17 MG/DL (ref 0–30)
WBC # BLD AUTO: 9 X10(3) UL (ref 4–11)

## 2022-04-20 PROCEDURE — 36415 COLL VENOUS BLD VENIPUNCTURE: CPT

## 2022-04-20 PROCEDURE — 82607 VITAMIN B-12: CPT

## 2022-04-20 PROCEDURE — 3061F NEG MICROALBUMINURIA REV: CPT | Performed by: INTERNAL MEDICINE

## 2022-04-20 PROCEDURE — 82570 ASSAY OF URINE CREATININE: CPT

## 2022-04-20 PROCEDURE — 80053 COMPREHEN METABOLIC PANEL: CPT

## 2022-04-20 PROCEDURE — 83036 HEMOGLOBIN GLYCOSYLATED A1C: CPT

## 2022-04-20 PROCEDURE — 3044F HG A1C LEVEL LT 7.0%: CPT | Performed by: INTERNAL MEDICINE

## 2022-04-20 PROCEDURE — 85025 COMPLETE CBC W/AUTO DIFF WBC: CPT

## 2022-04-20 PROCEDURE — 82043 UR ALBUMIN QUANTITATIVE: CPT

## 2022-04-20 PROCEDURE — 80061 LIPID PANEL: CPT

## 2022-04-26 ENCOUNTER — OFFICE VISIT (OUTPATIENT)
Dept: INTERNAL MEDICINE CLINIC | Facility: CLINIC | Age: 57
End: 2022-04-26
Payer: COMMERCIAL

## 2022-04-26 VITALS
WEIGHT: 170.5 LBS | BODY MASS INDEX: 30.21 KG/M2 | HEIGHT: 63 IN | RESPIRATION RATE: 18 BRPM | HEART RATE: 83 BPM | DIASTOLIC BLOOD PRESSURE: 75 MMHG | SYSTOLIC BLOOD PRESSURE: 107 MMHG

## 2022-04-26 DIAGNOSIS — Z12.31 BREAST CANCER SCREENING BY MAMMOGRAM: ICD-10-CM

## 2022-04-26 DIAGNOSIS — E11.22 CONTROLLED TYPE 2 DIABETES MELLITUS WITH STAGE 3 CHRONIC KIDNEY DISEASE, WITHOUT LONG-TERM CURRENT USE OF INSULIN (HCC): Primary | ICD-10-CM

## 2022-04-26 DIAGNOSIS — F41.9 ANXIETY: ICD-10-CM

## 2022-04-26 DIAGNOSIS — N18.30 CONTROLLED TYPE 2 DIABETES MELLITUS WITH STAGE 3 CHRONIC KIDNEY DISEASE, WITHOUT LONG-TERM CURRENT USE OF INSULIN (HCC): Primary | ICD-10-CM

## 2022-04-26 DIAGNOSIS — K21.9 GASTROESOPHAGEAL REFLUX DISEASE: ICD-10-CM

## 2022-04-26 PROBLEM — M76.32 ILIOTIBIAL BAND SYNDROME OF LEFT SIDE: Status: RESOLVED | Noted: 2020-12-03 | Resolved: 2022-04-26

## 2022-04-26 PROBLEM — F32.A ANXIETY AND DEPRESSION: Status: RESOLVED | Noted: 2018-02-17 | Resolved: 2022-04-26

## 2022-04-26 PROCEDURE — 3074F SYST BP LT 130 MM HG: CPT | Performed by: INTERNAL MEDICINE

## 2022-04-26 PROCEDURE — 3078F DIAST BP <80 MM HG: CPT | Performed by: INTERNAL MEDICINE

## 2022-04-26 PROCEDURE — 99214 OFFICE O/P EST MOD 30 MIN: CPT | Performed by: INTERNAL MEDICINE

## 2022-04-26 PROCEDURE — 3008F BODY MASS INDEX DOCD: CPT | Performed by: INTERNAL MEDICINE

## 2022-04-26 RX ORDER — PANTOPRAZOLE SODIUM 40 MG/1
40 TABLET, DELAYED RELEASE ORAL EVERY MORNING
Qty: 90 TABLET | Refills: 1 | Status: SHIPPED | OUTPATIENT
Start: 2022-04-26

## 2022-04-26 RX ORDER — SEMAGLUTIDE 1.34 MG/ML
0.5 INJECTION, SOLUTION SUBCUTANEOUS WEEKLY
Qty: 4.5 ML | Refills: 1 | Status: SHIPPED | OUTPATIENT
Start: 2022-04-26

## 2022-04-26 RX ORDER — METFORMIN HYDROCHLORIDE 500 MG/1
500 TABLET, EXTENDED RELEASE ORAL
Qty: 90 TABLET | Refills: 1 | Status: SHIPPED | OUTPATIENT
Start: 2022-04-26

## 2022-04-26 RX ORDER — LOSARTAN POTASSIUM 25 MG/1
25 TABLET ORAL DAILY
Qty: 90 TABLET | Refills: 1 | Status: SHIPPED | OUTPATIENT
Start: 2022-04-26

## 2022-04-26 NOTE — PATIENT INSTRUCTIONS
Please schedule your next appointment in October for a physical.  Please do your blood tests 2-5 days prior to your appointment with me.   You do not need to fast.

## 2022-05-07 DIAGNOSIS — N18.30 CONTROLLED TYPE 2 DIABETES MELLITUS WITH STAGE 3 CHRONIC KIDNEY DISEASE, WITHOUT LONG-TERM CURRENT USE OF INSULIN (HCC): ICD-10-CM

## 2022-05-07 DIAGNOSIS — E11.22 CONTROLLED TYPE 2 DIABETES MELLITUS WITH STAGE 3 CHRONIC KIDNEY DISEASE, WITHOUT LONG-TERM CURRENT USE OF INSULIN (HCC): ICD-10-CM

## 2022-05-07 RX ORDER — LOSARTAN POTASSIUM 25 MG/1
TABLET ORAL
Qty: 90 TABLET | Refills: 1 | OUTPATIENT
Start: 2022-05-07

## 2022-05-31 NOTE — ASSESSMENT & PLAN NOTE
Patient is due for an A1c today. She does not check her sugars at home. We discussed adding Ozempic for diabetic control and also weight loss.   I will refer her to the diabetic educator to learn how to check her home blood sugars and also to learn how to Enbrel Counseling:  I discussed with the patient the risks of etanercept including but not limited to myelosuppression, immunosuppression, autoimmune hepatitis, demyelinating diseases, lymphoma, and infections.  The patient understands that monitoring is required including a PPD at baseline and must alert us or the primary physician if symptoms of infection or other concerning signs are noted.

## 2022-06-29 DIAGNOSIS — E11.22 CONTROLLED TYPE 2 DIABETES MELLITUS WITH STAGE 3 CHRONIC KIDNEY DISEASE, WITHOUT LONG-TERM CURRENT USE OF INSULIN (HCC): ICD-10-CM

## 2022-06-29 DIAGNOSIS — N18.30 CONTROLLED TYPE 2 DIABETES MELLITUS WITH STAGE 3 CHRONIC KIDNEY DISEASE, WITHOUT LONG-TERM CURRENT USE OF INSULIN (HCC): ICD-10-CM

## 2022-06-29 RX ORDER — SEMAGLUTIDE 1.34 MG/ML
INJECTION, SOLUTION SUBCUTANEOUS
Qty: 4.5 ML | Refills: 1 | Status: SHIPPED | OUTPATIENT
Start: 2022-06-29

## 2022-08-17 ENCOUNTER — PATIENT MESSAGE (OUTPATIENT)
Dept: INTERNAL MEDICINE CLINIC | Facility: CLINIC | Age: 57
End: 2022-08-17

## 2022-08-17 RX ORDER — SCOLOPAMINE TRANSDERMAL SYSTEM 1 MG/1
1 PATCH, EXTENDED RELEASE TRANSDERMAL
Qty: 10 PATCH | Refills: 0 | Status: SHIPPED | OUTPATIENT
Start: 2022-08-17

## 2022-08-17 NOTE — TELEPHONE ENCOUNTER
DR Patel=see below,pended for approval,thanks. From: Chelo Townsend  To: Rosa Maria Lamar MD  Sent: 8/17/2022 12:43 PM CDT  Subject: Rx request    Hello Dr. Jayshree Shrestha, hope you are well. My  and I are going on a [place] cruise next week and I was wondering if you could prescribe motion sickness patches for me? Uncertain how I will do on a ship and would rather play it safe than sorry, our  suggested wearing a patch. Given the medications I currently take, thought it best to ask you for a prescription. Thank you in advance for your help!     Justa Golden  [phone]

## 2022-10-08 DIAGNOSIS — E78.00 HYPERCHOLESTEROLEMIA: ICD-10-CM

## 2022-10-08 RX ORDER — ATORVASTATIN CALCIUM 10 MG/1
10 TABLET, FILM COATED ORAL DAILY
Qty: 90 TABLET | Refills: 1 | Status: SHIPPED | OUTPATIENT
Start: 2022-10-08 | End: 2023-04-06

## 2022-10-24 ENCOUNTER — TELEPHONE (OUTPATIENT)
Dept: INTERNAL MEDICINE CLINIC | Facility: CLINIC | Age: 57
End: 2022-10-24

## 2022-10-24 ENCOUNTER — HOSPITAL ENCOUNTER (OUTPATIENT)
Dept: MAMMOGRAPHY | Age: 57
Discharge: HOME | End: 2022-10-24
Attending: INTERNAL MEDICINE
Payer: COMMERCIAL

## 2022-10-24 DIAGNOSIS — Z12.31 BREAST CANCER SCREENING BY MAMMOGRAM: ICD-10-CM

## 2022-10-24 PROCEDURE — 77063 BREAST TOMOSYNTHESIS BI: CPT | Performed by: INTERNAL MEDICINE

## 2022-10-24 PROCEDURE — 77067 SCR MAMMO BI INCL CAD: CPT | Performed by: INTERNAL MEDICINE

## 2022-10-24 NOTE — TELEPHONE ENCOUNTER
Pt just needs a non-fasting A1C, which we can do in the office when she comes for her visit. She had complete labs in April and they were all good except for the A1C.

## 2022-10-24 NOTE — TELEPHONE ENCOUNTER
Patient requesting lab orders, stated was instructed by Dr. Herb Lopez to get orders done before physical, but no orders were placed. Can be faxed to: [phone]. Requesting call once placed.

## 2022-10-31 ENCOUNTER — OFFICE VISIT (OUTPATIENT)
Facility: CLINIC | Age: 57
End: 2022-10-31
Payer: COMMERCIAL

## 2022-10-31 VITALS
DIASTOLIC BLOOD PRESSURE: 58 MMHG | RESPIRATION RATE: 16 BRPM | BODY MASS INDEX: 30.48 KG/M2 | HEART RATE: 79 BPM | SYSTOLIC BLOOD PRESSURE: 102 MMHG | HEIGHT: 63 IN | OXYGEN SATURATION: 98 % | WEIGHT: 172 LBS

## 2022-10-31 DIAGNOSIS — E11.22 CONTROLLED TYPE 2 DIABETES MELLITUS WITH STAGE 3 CHRONIC KIDNEY DISEASE, WITHOUT LONG-TERM CURRENT USE OF INSULIN (HCC): ICD-10-CM

## 2022-10-31 DIAGNOSIS — K21.9 GASTROESOPHAGEAL REFLUX DISEASE: ICD-10-CM

## 2022-10-31 DIAGNOSIS — Z00.00 ROUTINE HEALTH MAINTENANCE: Primary | ICD-10-CM

## 2022-10-31 DIAGNOSIS — E78.00 HYPERCHOLESTEROLEMIA: ICD-10-CM

## 2022-10-31 DIAGNOSIS — F41.9 ANXIETY: ICD-10-CM

## 2022-10-31 DIAGNOSIS — N18.30 CONTROLLED TYPE 2 DIABETES MELLITUS WITH STAGE 3 CHRONIC KIDNEY DISEASE, WITHOUT LONG-TERM CURRENT USE OF INSULIN (HCC): ICD-10-CM

## 2022-10-31 PROBLEM — R40.0 DAYTIME SOMNOLENCE: Status: RESOLVED | Noted: 2018-08-08 | Resolved: 2022-10-31

## 2022-10-31 LAB
CARTRIDGE LOT#: 924 NUMERIC
HEMOGLOBIN A1C: 6.3 % (ref 4.3–5.6)

## 2022-10-31 PROCEDURE — 3074F SYST BP LT 130 MM HG: CPT | Performed by: INTERNAL MEDICINE

## 2022-10-31 PROCEDURE — 83036 HEMOGLOBIN GLYCOSYLATED A1C: CPT | Performed by: INTERNAL MEDICINE

## 2022-10-31 PROCEDURE — 99214 OFFICE O/P EST MOD 30 MIN: CPT | Performed by: INTERNAL MEDICINE

## 2022-10-31 PROCEDURE — 3044F HG A1C LEVEL LT 7.0%: CPT | Performed by: INTERNAL MEDICINE

## 2022-10-31 PROCEDURE — 3078F DIAST BP <80 MM HG: CPT | Performed by: INTERNAL MEDICINE

## 2022-10-31 PROCEDURE — 3008F BODY MASS INDEX DOCD: CPT | Performed by: INTERNAL MEDICINE

## 2022-10-31 PROCEDURE — 99396 PREV VISIT EST AGE 40-64: CPT | Performed by: INTERNAL MEDICINE

## 2022-10-31 RX ORDER — LOSARTAN POTASSIUM 25 MG/1
25 TABLET ORAL DAILY
Qty: 90 TABLET | Refills: 1 | Status: SHIPPED | OUTPATIENT
Start: 2022-10-31

## 2022-10-31 RX ORDER — METFORMIN HYDROCHLORIDE 500 MG/1
500 TABLET, EXTENDED RELEASE ORAL
Qty: 90 TABLET | Refills: 1 | Status: SHIPPED | OUTPATIENT
Start: 2022-10-31

## 2022-10-31 RX ORDER — PANTOPRAZOLE SODIUM 40 MG/1
40 TABLET, DELAYED RELEASE ORAL EVERY MORNING
Qty: 90 TABLET | Refills: 1 | Status: SHIPPED | OUTPATIENT
Start: 2022-10-31

## 2022-10-31 RX ORDER — SEMAGLUTIDE 1.34 MG/ML
0.5 INJECTION, SOLUTION SUBCUTANEOUS WEEKLY
Qty: 4.5 ML | Refills: 1 | Status: SHIPPED | OUTPATIENT
Start: 2022-10-31

## 2022-10-31 NOTE — ASSESSMENT & PLAN NOTE
Unremarkable exam.  Pt's colon cancer screening is up to date. Immunizations were reviewed. Counseled pt regarding diet and exercise. Reviewed labs with pt.

## 2022-10-31 NOTE — PATIENT INSTRUCTIONS
Please do the non-fasting thyroid blood test soon--only the thyroid test.  Please do not take vitamins or supplements for 3 days prior to the blood test.     Please schedule your next appointment in 6 months. Do fasting labs 2-4 days before that appointment. Fast for 12 hours. Water and meds are okay.    Do not take vitamins or supplements for 3 days prior to the blood test.

## 2022-11-14 ENCOUNTER — TELEPHONE (OUTPATIENT)
Facility: CLINIC | Age: 57
End: 2022-11-14

## 2022-11-14 NOTE — TELEPHONE ENCOUNTER
Patient outreach message received:    GI RN staff: Enter EGD recall is for 3 years. Recall reminder letter mailed out to patient.

## 2022-11-21 ENCOUNTER — LAB ENCOUNTER (OUTPATIENT)
Dept: LAB | Age: 57
End: 2022-11-21
Attending: INTERNAL MEDICINE
Payer: COMMERCIAL

## 2022-11-21 DIAGNOSIS — Z00.00 ROUTINE HEALTH MAINTENANCE: ICD-10-CM

## 2022-11-21 LAB — TSI SER-ACNC: 2.4 MIU/ML (ref 0.36–3.74)

## 2022-11-21 PROCEDURE — 36415 COLL VENOUS BLD VENIPUNCTURE: CPT

## 2022-11-21 PROCEDURE — 84443 ASSAY THYROID STIM HORMONE: CPT

## 2022-12-05 ENCOUNTER — OFFICE VISIT (OUTPATIENT)
Dept: PHYSICAL MEDICINE AND REHAB | Facility: CLINIC | Age: 57
End: 2022-12-05
Payer: COMMERCIAL

## 2022-12-05 VITALS
HEART RATE: 86 BPM | DIASTOLIC BLOOD PRESSURE: 80 MMHG | OXYGEN SATURATION: 99 % | HEIGHT: 63 IN | SYSTOLIC BLOOD PRESSURE: 126 MMHG | BODY MASS INDEX: 30.48 KG/M2 | WEIGHT: 172 LBS

## 2022-12-05 DIAGNOSIS — G56.03 BILATERAL CARPAL TUNNEL SYNDROME: ICD-10-CM

## 2022-12-05 DIAGNOSIS — I10 ESSENTIAL HYPERTENSION: ICD-10-CM

## 2022-12-05 DIAGNOSIS — M79.644 BILATERAL THUMB PAIN: ICD-10-CM

## 2022-12-05 DIAGNOSIS — M54.2 TRIGGER POINT OF NECK: ICD-10-CM

## 2022-12-05 DIAGNOSIS — R20.0 NUMBNESS IN BOTH HANDS: Primary | ICD-10-CM

## 2022-12-05 DIAGNOSIS — M79.645 BILATERAL THUMB PAIN: ICD-10-CM

## 2022-12-05 PROCEDURE — 99214 OFFICE O/P EST MOD 30 MIN: CPT | Performed by: PHYSICAL MEDICINE & REHABILITATION

## 2022-12-05 PROCEDURE — 3079F DIAST BP 80-89 MM HG: CPT | Performed by: PHYSICAL MEDICINE & REHABILITATION

## 2022-12-05 PROCEDURE — 3008F BODY MASS INDEX DOCD: CPT | Performed by: PHYSICAL MEDICINE & REHABILITATION

## 2022-12-05 PROCEDURE — 3074F SYST BP LT 130 MM HG: CPT | Performed by: PHYSICAL MEDICINE & REHABILITATION

## 2022-12-05 NOTE — PATIENT INSTRUCTIONS
1) My office will call you to schedule the BILATERAL carpal tunnel  CSI under ultrasound guidance once the procedure is approved by your insurance carrier. Lets plan on 12/13/22 at 1:20 in [place]  2) If the carpal tunnel injections are not helpful, then would recommend C7-T1 ILESI   3) Use carpal tunnel splints at night  4) Restart home exercise program for carpal tunnel.

## 2022-12-09 ENCOUNTER — TELEPHONE (OUTPATIENT)
Dept: NEUROLOGY | Facility: CLINIC | Age: 57
End: 2022-12-09

## 2022-12-09 DIAGNOSIS — E11.22 CONTROLLED TYPE 2 DIABETES MELLITUS WITH STAGE 3 CHRONIC KIDNEY DISEASE, WITHOUT LONG-TERM CURRENT USE OF INSULIN (HCC): ICD-10-CM

## 2022-12-09 DIAGNOSIS — N18.30 CONTROLLED TYPE 2 DIABETES MELLITUS WITH STAGE 3 CHRONIC KIDNEY DISEASE, WITHOUT LONG-TERM CURRENT USE OF INSULIN (HCC): ICD-10-CM

## 2022-12-09 NOTE — TELEPHONE ENCOUNTER
BILATERAL carpal tunnel CSi under ultrasound guidance    CPT CODE: 78775N6,,87316    Status: Pre authorization is not required    [employer] for authorization of approval for above. Notification or Prior Authorization is not required for the requested services.     Reference Number  [id number]    Notified Approved Referrals for scheduling

## 2022-12-11 RX ORDER — SEMAGLUTIDE 1.34 MG/ML
INJECTION, SOLUTION SUBCUTANEOUS
Qty: 4.5 ML | Refills: 1 | Status: SHIPPED | OUTPATIENT
Start: 2022-12-11

## 2022-12-11 NOTE — TELEPHONE ENCOUNTER
Refill passed per [facility] protocol.     Requested Prescriptions   Pending Prescriptions Disp Refills    OZEMPIC, 0.25 OR 0.5 MG/DOSE, 2 MG/1.5ML Subcutaneous Solution Pen-injector [Pharmacy Med Name: Dilip Gonsalves 0.25/0.5] 4.5 mL 1     Sig: INJECT 0.5MG SUBCUTANEOUSLYONCE A WEEK       Diabetes Medication Protocol Passed - 12/9/2022  7:07 PM        Passed - Last A1C < 7.5 and within past 6 months     Lab Results   Component Value Date    A1C 6.3 (A) 10/31/2022             Passed - In person appointment or virtual visit in the past 6 mos or appointment in next 3 mos     Recent Outpatient Visits              6 days ago Numbness in both hands    [facility] Viri Roblero MD    Office Visit    1 month ago Routine health maintenance    [facility], [facility] - 3rd [facility], Gerry Koroma MD    Office Visit    7 months ago Controlled type 2 diabetes mellitus with stage 3 chronic kidney disease, without long-term current use of insulin [facility])    [facility], [address], John Polanco MD    Office Visit    10 months ago Controlled type 2 diabetes mellitus with stage 3 chronic kidney disease, without long-term current use of insulin [facility])    [facility], [address], John Polanco MD    Office Visit    11 months ago Neck pain    [facility]    Telemedicine          Future Appointments         Provider Department Appt Notes    In 2 days Viri Roblero MD [facility], [address], [place]-Physiatry bilateral carpal tunnel injections    In 3 months Shruti Oropeza MD [facility], [address] Due for EGD or Upper Endoscopy, per reminder letter received    In 4 months Lilly Soto MD [facility], [address], [place] Follow up visit               Passed - [facility] or GFRNAA > 50     GFR Evaluation  GFRNAA: 62 , resulted on 4/20/2022          Passed - GFR in the past 12 months             Recent Outpatient Visits              6 days ago Numbness in both hands    [address] [facility], [place]-Physiatr Viri Roblero MD    Office Visit    1 month ago Routine health maintenance    [facility], [facility] - 3rd floor, Gerry Koroma MD    Office Visit    7 months ago Controlled type 2 diabetes mellitus with stage 3 chronic kidney disease, without long-term current use of insulin [facility])    [facility], [address], John Polanco MD    Office Visit    10 months ago Controlled type 2 diabetes mellitus with stage 3 chronic kidney disease, without long-term current use of insulin [facility])    [facility], [address], John Polanco MD    Office Visit    11 months ago Neck pain    [address] [facility], Lucia Sutherland MD    Telemedicine            Future Appointments         Provider Department Appt Notes    In 2 days Viri Roblero MD [facility], [address], [place]-Physiatry bilateral carpal tunnel injections    In 3 months Shruti Oropeza MD [facility], [facility], [address] Due for EGD or Upper Endoscopy, per reminder letter received    In 4 months Lilly Soto MD [facility], [address], [place] Follow up visit

## 2022-12-13 ENCOUNTER — OFFICE VISIT (OUTPATIENT)
Dept: PHYSICAL MEDICINE AND REHAB | Facility: CLINIC | Age: 57
End: 2022-12-13
Payer: COMMERCIAL

## 2022-12-13 DIAGNOSIS — G56.03 BILATERAL CARPAL TUNNEL SYNDROME: ICD-10-CM

## 2022-12-13 DIAGNOSIS — R20.0 NUMBNESS IN BOTH HANDS: Primary | ICD-10-CM

## 2022-12-13 NOTE — PATIENT INSTRUCTIONS
Post Injection Instructions     Please do not do anything strenuous over the next two days (if you had a knee injection do not walk more than 2 city blocks, do not attend any aerobic classes, do not run, no heavy lifting, no prolong standing). You may resume your day to day activities after your injection. You may experience some mild amount of swelling after the procedure. Please ice your joint that was injected at least 5-6 times a day (15 minutes) for two days after (this will help prevent worsening pain that sometimes occurs after an injection). Only take tylenol if needed for pain for the first few days. Watch for signs of infection which include redness, warmth, worsening pain, fevers or chills. If you develop any of these signs call the office immediately at [phone]    Everyone responds differently to injections, but you can expect your peak effects a few weeks after your last injection. Kathleen Montano.  Marlen Jarrell MD  Physical Medicine and Rehabilitation/Sports Medicine  MEDICAL CENTER [facility]

## 2023-01-12 NOTE — TELEPHONE ENCOUNTER
Last Procedure:  CLN 8/6/14  Last Diagnosis:  1. Colon polyps as described above. 2. Uncomplicated diverticulosis transverse and left colon. 3. Hiatal hernia.    Recalled for (years): 3 years-due 8/6/17  Sedation used previously:  IV  Last Prep Used (if k
Per Dr. Dania Nix on TE 8/11/17, okay'd to add on EGD    Scheduled for:  Colonoscopy [phone] and [phone]  Provider Name: Dr. Dania Nix  Date:  10/31/17  Location:  [facility]  Sedation:  IV  Time:  1000 (pt is aware to arrive at 0900)   Prep:  Colyte, mailed 8/14/17 with codes
Pt called to schedule repeat 3 year CLN/EGD. Pt is aware of at least 72hr call back time.  Please Call [phone] Thank you
The patient's chart has been reviewed. Okay to schedule pt for 3 year colonoscopy recall r/t 1 polyps, screening for colon cancer with Dr. Durga Petersen. Advise IV [place] sedation with split dose Colyte (eRx) preparation.      May continue all medicati
No

## 2023-01-31 ENCOUNTER — OFFICE VISIT (OUTPATIENT)
Dept: PHYSICAL MEDICINE AND REHAB | Facility: CLINIC | Age: 58
End: 2023-01-31
Payer: COMMERCIAL

## 2023-01-31 VITALS — HEIGHT: 63 IN | BODY MASS INDEX: 30.48 KG/M2 | WEIGHT: 172 LBS | OXYGEN SATURATION: 96 % | HEART RATE: 69 BPM

## 2023-01-31 DIAGNOSIS — I10 ESSENTIAL HYPERTENSION: ICD-10-CM

## 2023-01-31 DIAGNOSIS — M79.645 BILATERAL THUMB PAIN: ICD-10-CM

## 2023-01-31 DIAGNOSIS — G56.03 BILATERAL CARPAL TUNNEL SYNDROME: ICD-10-CM

## 2023-01-31 DIAGNOSIS — M79.644 BILATERAL THUMB PAIN: ICD-10-CM

## 2023-01-31 DIAGNOSIS — R20.0 NUMBNESS IN BOTH HANDS: Primary | ICD-10-CM

## 2023-01-31 PROCEDURE — 3008F BODY MASS INDEX DOCD: CPT | Performed by: PHYSICAL MEDICINE & REHABILITATION

## 2023-01-31 PROCEDURE — 99213 OFFICE O/P EST LOW 20 MIN: CPT | Performed by: PHYSICAL MEDICINE & REHABILITATION

## 2023-03-22 ENCOUNTER — TELEPHONE (OUTPATIENT)
Facility: CLINIC | Age: 58
End: 2023-03-22

## 2023-03-22 ENCOUNTER — OFFICE VISIT (OUTPATIENT)
Facility: CLINIC | Age: 58
End: 2023-03-22

## 2023-03-22 VITALS
DIASTOLIC BLOOD PRESSURE: 81 MMHG | HEART RATE: 74 BPM | SYSTOLIC BLOOD PRESSURE: 120 MMHG | HEIGHT: 63 IN | WEIGHT: 169 LBS | BODY MASS INDEX: 29.95 KG/M2

## 2023-03-22 DIAGNOSIS — K22.70 SHORT-SEGMENT BARRETT'S ESOPHAGUS: ICD-10-CM

## 2023-03-22 DIAGNOSIS — Z86.010 HISTORY OF COLON POLYPS: ICD-10-CM

## 2023-03-22 DIAGNOSIS — K22.70 BARRETT'S ESOPHAGUS WITHOUT DYSPLASIA: Primary | ICD-10-CM

## 2023-03-22 DIAGNOSIS — K21.9 GASTROESOPHAGEAL REFLUX DISEASE WITHOUT ESOPHAGITIS: Primary | ICD-10-CM

## 2023-03-22 PROCEDURE — 3079F DIAST BP 80-89 MM HG: CPT | Performed by: INTERNAL MEDICINE

## 2023-03-22 PROCEDURE — 3008F BODY MASS INDEX DOCD: CPT | Performed by: INTERNAL MEDICINE

## 2023-03-22 PROCEDURE — 99213 OFFICE O/P EST LOW 20 MIN: CPT | Performed by: INTERNAL MEDICINE

## 2023-03-22 PROCEDURE — 3074F SYST BP LT 130 MM HG: CPT | Performed by: INTERNAL MEDICINE

## 2023-03-22 NOTE — TELEPHONE ENCOUNTER
Scheduled for:  EGD [id number]  Provider Name:  Dr Isaiah Williamson  Date:  05/17/2023  Location:  [facility]  Sedation:  MAC  Time:  0730 (pt is aware to arrive at 0630)  Prep: NPO after midnight    Meds/Allergies Reconciled?: Physician reviewed    Diagnosis with codes:  Barretts Esophagus K22.70  Was patient informed to call insurance with codes (Y/N):  Y     Referral sent?:  Referral was sent at the time of electronic surgical scheduling. [facility] or [address] notified?:  I sent an electronic request to Endo Scheduling and received a confirmation today. Medication Orders:  Pt is aware to NOT take iron pills, herbal meds and diet supplements for 7 days before exam. Also to NOT take any form of alcohol, recreational drugs and any forms of ED meds 24 hours before exam.     Misc Orders:       Further instructions given by staff:  I discussed the prep intructions with the patient in office which SHE verbally understood. Copy of instructions was handed to patient as well. Patient was also advised he will receive a call from PAT nurse 72-24 hours prior procedure to schedule Covid test done.

## 2023-03-22 NOTE — PATIENT INSTRUCTIONS
1.  Continue pantoprazole. 2.  Schedule upper endoscopy for Carrasco's esophagus with monitored anesthesia care.

## 2023-03-30 ENCOUNTER — LAB ENCOUNTER (OUTPATIENT)
Dept: LAB | Age: 58
End: 2023-03-30
Attending: INTERNAL MEDICINE
Payer: COMMERCIAL

## 2023-03-30 DIAGNOSIS — N18.30 CONTROLLED TYPE 2 DIABETES MELLITUS WITH STAGE 3 CHRONIC KIDNEY DISEASE, WITHOUT LONG-TERM CURRENT USE OF INSULIN (HCC): ICD-10-CM

## 2023-03-30 DIAGNOSIS — Z00.00 ROUTINE HEALTH MAINTENANCE: ICD-10-CM

## 2023-03-30 DIAGNOSIS — E11.22 CONTROLLED TYPE 2 DIABETES MELLITUS WITH STAGE 3 CHRONIC KIDNEY DISEASE, WITHOUT LONG-TERM CURRENT USE OF INSULIN (HCC): ICD-10-CM

## 2023-03-30 LAB
ALBUMIN SERPL-MCNC: 3.7 G/DL (ref 3.4–5)
ALBUMIN/GLOB SERPL: 1.2 {RATIO} (ref 1–2)
ALP LIVER SERPL-CCNC: 91 U/L
ALT SERPL-CCNC: 21 U/L
ANION GAP SERPL CALC-SCNC: 4 MMOL/L (ref 0–18)
AST SERPL-CCNC: 10 U/L (ref 15–37)
BASOPHILS # BLD AUTO: 0.01 X10(3) UL (ref 0–0.2)
BASOPHILS NFR BLD AUTO: 0.1 %
BILIRUB SERPL-MCNC: 0.6 MG/DL (ref 0.1–2)
BUN BLD-MCNC: 12 MG/DL (ref 7–18)
BUN/CREAT SERPL: 11.9 (ref 10–20)
CALCIUM BLD-MCNC: 9.7 MG/DL (ref 8.5–10.1)
CHLORIDE SERPL-SCNC: 109 MMOL/L (ref 98–112)
CHOLEST SERPL-MCNC: 167 MG/DL (ref ?–200)
CO2 SERPL-SCNC: 29 MMOL/L (ref 21–32)
CREAT BLD-MCNC: 1.01 MG/DL
CREAT UR-SCNC: 82.1 MG/DL
DEPRECATED RDW RBC AUTO: 41.2 FL (ref 35.1–46.3)
EOSINOPHIL # BLD AUTO: 0.14 X10(3) UL (ref 0–0.7)
EOSINOPHIL NFR BLD AUTO: 1.8 %
ERYTHROCYTE [DISTWIDTH] IN BLOOD BY AUTOMATED COUNT: 12.2 % (ref 11–15)
EST. AVERAGE GLUCOSE BLD GHB EST-MCNC: 134 MG/DL (ref 68–126)
FASTING PATIENT LIPID ANSWER: YES
FASTING STATUS PATIENT QL REPORTED: YES
GFR SERPLBLD BASED ON 1.73 SQ M-ARVRAT: 64 ML/MIN/1.73M2 (ref 60–?)
GLOBULIN PLAS-MCNC: 3.2 G/DL (ref 2.8–4.4)
GLUCOSE BLD-MCNC: 102 MG/DL (ref 70–99)
HBA1C MFR BLD: 6.3 % (ref ?–5.7)
HCT VFR BLD AUTO: 38.1 %
HDLC SERPL-MCNC: 77 MG/DL (ref 40–59)
HGB BLD-MCNC: 12.3 G/DL
IMM GRANULOCYTES # BLD AUTO: 0.01 X10(3) UL (ref 0–1)
IMM GRANULOCYTES NFR BLD: 0.1 %
LDLC SERPL CALC-MCNC: 76 MG/DL (ref ?–100)
LYMPHOCYTES # BLD AUTO: 2.5 X10(3) UL (ref 1–4)
LYMPHOCYTES NFR BLD AUTO: 32.1 %
MCH RBC QN AUTO: 29.6 PG (ref 26–34)
MCHC RBC AUTO-ENTMCNC: 32.3 G/DL (ref 31–37)
MCV RBC AUTO: 91.8 FL
MICROALBUMIN UR-MCNC: <0.5 MG/DL
MONOCYTES # BLD AUTO: 0.56 X10(3) UL (ref 0.1–1)
MONOCYTES NFR BLD AUTO: 7.2 %
NEUTROPHILS # BLD AUTO: 4.56 X10 (3) UL (ref 1.5–7.7)
NEUTROPHILS # BLD AUTO: 4.56 X10(3) UL (ref 1.5–7.7)
NEUTROPHILS NFR BLD AUTO: 58.7 %
NONHDLC SERPL-MCNC: 90 MG/DL (ref ?–130)
OSMOLALITY SERPL CALC.SUM OF ELEC: 294 MOSM/KG (ref 275–295)
PLATELET # BLD AUTO: 268 10(3)UL (ref 150–450)
POTASSIUM SERPL-SCNC: 4.8 MMOL/L (ref 3.5–5.1)
PROT SERPL-MCNC: 6.9 G/DL (ref 6.4–8.2)
RBC # BLD AUTO: 4.15 X10(6)UL
SODIUM SERPL-SCNC: 142 MMOL/L (ref 136–145)
TRIGL SERPL-MCNC: 73 MG/DL (ref 30–149)
TSI SER-ACNC: 2.37 MIU/ML (ref 0.36–3.74)
VLDLC SERPL CALC-MCNC: 11 MG/DL (ref 0–30)
WBC # BLD AUTO: 7.8 X10(3) UL (ref 4–11)

## 2023-03-30 PROCEDURE — 82570 ASSAY OF URINE CREATININE: CPT

## 2023-03-30 PROCEDURE — 83036 HEMOGLOBIN GLYCOSYLATED A1C: CPT

## 2023-03-30 PROCEDURE — 3061F NEG MICROALBUMINURIA REV: CPT | Performed by: INTERNAL MEDICINE

## 2023-03-30 PROCEDURE — 84443 ASSAY THYROID STIM HORMONE: CPT

## 2023-03-30 PROCEDURE — 85025 COMPLETE CBC W/AUTO DIFF WBC: CPT

## 2023-03-30 PROCEDURE — 82043 UR ALBUMIN QUANTITATIVE: CPT

## 2023-03-30 PROCEDURE — 80061 LIPID PANEL: CPT

## 2023-03-30 PROCEDURE — 80053 COMPREHEN METABOLIC PANEL: CPT

## 2023-03-30 PROCEDURE — 36415 COLL VENOUS BLD VENIPUNCTURE: CPT

## 2023-04-06 ENCOUNTER — PATIENT MESSAGE (OUTPATIENT)
Dept: INTERNAL MEDICINE CLINIC | Facility: CLINIC | Age: 58
End: 2023-04-06

## 2023-04-06 ENCOUNTER — OFFICE VISIT (OUTPATIENT)
Dept: INTERNAL MEDICINE CLINIC | Facility: CLINIC | Age: 58
End: 2023-04-06

## 2023-04-06 VITALS
SYSTOLIC BLOOD PRESSURE: 101 MMHG | DIASTOLIC BLOOD PRESSURE: 71 MMHG | HEIGHT: 63 IN | RESPIRATION RATE: 16 BRPM | BODY MASS INDEX: 30.48 KG/M2 | WEIGHT: 172 LBS | HEART RATE: 75 BPM | OXYGEN SATURATION: 98 %

## 2023-04-06 DIAGNOSIS — F41.9 ANXIETY: ICD-10-CM

## 2023-04-06 DIAGNOSIS — E11.22 CONTROLLED TYPE 2 DIABETES MELLITUS WITH STAGE 3 CHRONIC KIDNEY DISEASE, WITHOUT LONG-TERM CURRENT USE OF INSULIN (HCC): Primary | ICD-10-CM

## 2023-04-06 DIAGNOSIS — N18.30 CONTROLLED TYPE 2 DIABETES MELLITUS WITH STAGE 3 CHRONIC KIDNEY DISEASE, WITHOUT LONG-TERM CURRENT USE OF INSULIN (HCC): Primary | ICD-10-CM

## 2023-04-06 DIAGNOSIS — K21.9 GASTROESOPHAGEAL REFLUX DISEASE: ICD-10-CM

## 2023-04-06 DIAGNOSIS — E78.00 HYPERCHOLESTEROLEMIA: ICD-10-CM

## 2023-04-06 DIAGNOSIS — Z12.31 BREAST CANCER SCREENING BY MAMMOGRAM: ICD-10-CM

## 2023-04-06 PROCEDURE — 3044F HG A1C LEVEL LT 7.0%: CPT | Performed by: INTERNAL MEDICINE

## 2023-04-06 PROCEDURE — 3078F DIAST BP <80 MM HG: CPT | Performed by: INTERNAL MEDICINE

## 2023-04-06 PROCEDURE — 3008F BODY MASS INDEX DOCD: CPT | Performed by: INTERNAL MEDICINE

## 2023-04-06 PROCEDURE — 99214 OFFICE O/P EST MOD 30 MIN: CPT | Performed by: INTERNAL MEDICINE

## 2023-04-06 PROCEDURE — 3074F SYST BP LT 130 MM HG: CPT | Performed by: INTERNAL MEDICINE

## 2023-04-06 RX ORDER — METFORMIN HYDROCHLORIDE 500 MG/1
500 TABLET, EXTENDED RELEASE ORAL
Qty: 90 TABLET | Refills: 1 | Status: SHIPPED | OUTPATIENT
Start: 2023-04-06

## 2023-04-06 RX ORDER — ATORVASTATIN CALCIUM 10 MG/1
10 TABLET, FILM COATED ORAL DAILY
Qty: 90 TABLET | Refills: 3 | Status: SHIPPED | OUTPATIENT
Start: 2023-04-06

## 2023-04-06 RX ORDER — PANTOPRAZOLE SODIUM 40 MG/1
40 TABLET, DELAYED RELEASE ORAL EVERY MORNING
Qty: 90 TABLET | Refills: 1 | Status: SHIPPED | OUTPATIENT
Start: 2023-04-06

## 2023-04-06 RX ORDER — LOSARTAN POTASSIUM 25 MG/1
25 TABLET ORAL DAILY
Qty: 90 TABLET | Refills: 1 | Status: SHIPPED | OUTPATIENT
Start: 2023-04-06

## 2023-04-06 NOTE — ASSESSMENT & PLAN NOTE
Recent A1c was 6.3. Foot exam today is normal.  Patient has an appointment with the eye doctor. Kidney function is normal.  Continue present management.

## 2023-04-06 NOTE — TELEPHONE ENCOUNTER
Refill passed per [place] [employer], [facility] protocol.   Requested Prescriptions   Pending Prescriptions Disp Refills    ATORVASTATIN 10 MG Oral Tab [Pharmacy Med Name: ATORVASTATIN TAB 10MG] 90 tablet 1     Sig: TAKE 1 TABLET DAILY       Cholesterol Medication Protocol Passed - 4/6/2023  1:19 AM        Passed - ALT in past 12 months        Passed - LDL in past 12 months        Passed - Last ALT < 80     Lab Results   Component Value Date    ALT 21 03/30/2023             Passed - Last LDL < 130     Lab Results   Component Value Date    LDL 76 03/30/2023             Passed - In person appointment or virtual visit in the past 12 mos or appointment in next 3 mos     Recent Outpatient Visits              2 weeks ago Gastroesophageal reflux disease without esophagitis    [facility], [address], Alka Jean-Baptiste MD    Office Visit    2 months ago Numbness in both hands    [address] [place] [address], Rocael Saenz MD    Office Visit    3 months ago Numbness in both hands    [address], [address], Chris Mcneill MD    Office Visit    4 months ago Numbness in both hands    [address] [place] [address], Rocael Saenz MD    Office Visit    5 months ago Routine health maintenance    [address], [facility], Eva Medrano MD    Office Visit          Future Appointments         Provider Department Appt Notes    Today Levi Sharpe MD [address], [place] Follow up visit    In 1 month [facility], [address] [place] [address] 20, [address], [place] EGD w MAC @ [address] [place] [address]                 SERTRALINE 50 MG Oral Tab [Pharmacy Med Name: SERTRALINE TAB 50MG] 90 tablet 1     Sig: TAKE 1 TABLET EVERY MORNING       Psychiatric Non-Scheduled (Anti-Anxiety) Passed - 4/6/2023  1:19 AM        Passed - In person appointment or virtual visit in the past 6 mos or appointment in next 3 mos     Recent Outpatient Visits              2 weeks ago Gastroesophageal reflux disease without esophagitis    [facility], [address], [place]    Office Visit    2 months ago Numbness in both hands    Magali Avila MD    Office Visit    3 months ago Numbness in both hands    [facility], [address], Juan José Webb MD    Office Visit    4 months ago Numbness in both hands    [facility], [address], Magali Carcamo MD    Office Visit    5 months ago Routine health maintenance    [facility], [address] , Fortino Durán MD    Office Visit          Future Appointments         Provider Department Appt Notes    Today MD Syed Reyna [address], [place] Follow up visit    [facility] [address], [address] [facility], [place] EGD w [address] @ [address] [place] [address]                  Recent Outpatient Visits              2 weeks ago Gastroesophageal reflux disease without esophagitis    Aurora Cunningham MD    Office Visit    2 months ago Numbness in both hands    [facility], [address], Magali Carcamo MD    Office Visit    3 months ago Numbness in both hands    [facility], [address], Juan José Webb MD    Office Visit    4 months ago Numbness in both hands    [facility], [address] [facility],[address], Magali Carcamo MD    Office Visit    5 months ago Routine health maintenance    [facility], [address], Fortino Durán MD    Office Visit          Future Appointments         Provider Department Appt Notes    Today MD Syed eRyna [address], [place] Follow up visit    [facility], [address] [place] [address],[address], [place] EGD w MAC @ [address] [place] [address]

## 2023-04-07 NOTE — TELEPHONE ENCOUNTER
Per patient she did not have her A1C checked at her visit, see mychart msg. She has attached time stamped photo of result. Order and documentation to be entered for correct patient and deleted for [facility].

## 2023-05-02 NOTE — TELEPHONE ENCOUNTER
Lacy Santos calling from the lab to clarify  that the lab draw was ordered back in October and was complete at our lab as per the order     Routing as GERALD

## 2023-05-10 ENCOUNTER — TELEPHONE (OUTPATIENT)
Facility: CLINIC | Age: 58
End: 2023-05-10

## 2023-05-10 NOTE — TELEPHONE ENCOUNTER
Dr Sade Peck    The patient is scheduled for EGD on 5/17/2023 with Dr Dieudonne Vance    Please advise on Ozempic dose adjustment if necessary based on the diet modification prior to the procedure. We will hold Metformin the evening before and day of procedure. She will be NPO after midnight. Thank you.

## 2023-05-10 NOTE — TELEPHONE ENCOUNTER
GI RNS-    Spoke with patient states she is on Metformin and ozempic,     Patient is scheduled for EGD  On 5/17/2023, please advise on orders with Dr. Diane Lopez office. Thanks!

## 2023-05-10 NOTE — TELEPHONE ENCOUNTER
I faxed request to Dr Meera Jimenez office ([phone]) for Ozempic adjustment if necessary prior to procedure on 5/17/2023. The patient will hold Metformin the evening before and day of procedure.

## 2023-05-11 NOTE — TELEPHONE ENCOUNTER
Dr. Sunny Chang,  Patient is scheduled for an EGD and not a colonoscopy. She will be NPO after midnight and not on a clear liquid diet. Patient states she takes Ozempic  once weekly and takes it on a Monday. Her EGD is scheduled on a Wednesday. Please clarify if you want me to use your same instructions. Thank you.

## 2023-05-11 NOTE — TELEPHONE ENCOUNTER
Please call pt. She should not take the Ozempic the day before or the day of the colonoscopy.   Do not take metformin the evening before or on the day of the colonoscopy

## 2023-05-17 ENCOUNTER — ANESTHESIA (OUTPATIENT)
Dept: ENDOSCOPY | Facility: HOSPITAL | Age: 58
End: 2023-05-17
Payer: COMMERCIAL

## 2023-05-17 ENCOUNTER — ANESTHESIA EVENT (OUTPATIENT)
Dept: ENDOSCOPY | Facility: HOSPITAL | Age: 58
End: 2023-05-17
Payer: COMMERCIAL

## 2023-05-17 ENCOUNTER — HOSPITAL ENCOUNTER (OUTPATIENT)
Facility: HOSPITAL | Age: 58
Setting detail: HOSPITAL OUTPATIENT SURGERY
Discharge: HOME | End: 2023-05-17
Attending: INTERNAL MEDICINE | Admitting: INTERNAL MEDICINE
Payer: COMMERCIAL

## 2023-05-17 VITALS
OXYGEN SATURATION: 94 % | DIASTOLIC BLOOD PRESSURE: 70 MMHG | WEIGHT: 170 LBS | HEART RATE: 67 BPM | BODY MASS INDEX: 30.12 KG/M2 | SYSTOLIC BLOOD PRESSURE: 98 MMHG | HEIGHT: 63 IN | RESPIRATION RATE: 13 BRPM

## 2023-05-17 DIAGNOSIS — K22.70 SHORT-SEGMENT BARRETT'S ESOPHAGUS: ICD-10-CM

## 2023-05-17 DIAGNOSIS — K31.89 RETAINED FOOD IN STOMACH: Primary | ICD-10-CM

## 2023-05-17 DIAGNOSIS — K44.9 HIATAL HERNIA: ICD-10-CM

## 2023-05-17 DIAGNOSIS — K22.70 BARRETT'S ESOPHAGUS WITHOUT DYSPLASIA: ICD-10-CM

## 2023-05-17 LAB — GLUCOSE BLDC GLUCOMTR-MCNC: 112 MG/DL (ref 70–99)

## 2023-05-17 PROCEDURE — 43239 EGD BIOPSY SINGLE/MULTIPLE: CPT | Performed by: INTERNAL MEDICINE

## 2023-05-17 PROCEDURE — 0DB48ZX EXCISION OF ESOPHAGOGASTRIC JUNCTION, VIA NATURAL OR ARTIFICIAL OPENING ENDOSCOPIC, DIAGNOSTIC: ICD-10-PCS | Performed by: INTERNAL MEDICINE

## 2023-05-17 RX ORDER — DEXTROSE MONOHYDRATE 25 G/50ML
50 INJECTION, SOLUTION INTRAVENOUS
Status: DISCONTINUED | OUTPATIENT
Start: 2023-05-17 | End: 2023-05-17

## 2023-05-17 RX ORDER — NALOXONE HYDROCHLORIDE 0.4 MG/ML
80 INJECTION, SOLUTION INTRAMUSCULAR; INTRAVENOUS; SUBCUTANEOUS AS NEEDED
Status: DISCONTINUED | OUTPATIENT
Start: 2023-05-17 | End: 2023-05-17

## 2023-05-17 RX ORDER — NICOTINE POLACRILEX 4 MG
15 LOZENGE BUCCAL
Status: DISCONTINUED | OUTPATIENT
Start: 2023-05-17 | End: 2023-05-17

## 2023-05-17 RX ORDER — SODIUM CHLORIDE, SODIUM LACTATE, POTASSIUM CHLORIDE, CALCIUM CHLORIDE 600; 310; 30; 20 MG/100ML; MG/100ML; MG/100ML; MG/100ML
INJECTION, SOLUTION INTRAVENOUS CONTINUOUS
Status: DISCONTINUED | OUTPATIENT
Start: 2023-05-17 | End: 2023-05-17

## 2023-05-17 RX ORDER — NICOTINE POLACRILEX 4 MG
30 LOZENGE BUCCAL
Status: DISCONTINUED | OUTPATIENT
Start: 2023-05-17 | End: 2023-05-17

## 2023-05-17 RX ORDER — LIDOCAINE HYDROCHLORIDE 10 MG/ML
INJECTION, SOLUTION EPIDURAL; INFILTRATION; INTRACAUDAL; PERINEURAL AS NEEDED
Status: DISCONTINUED | OUTPATIENT
Start: 2023-05-17 | End: 2023-05-17 | Stop reason: SURG

## 2023-05-17 RX ADMIN — SODIUM CHLORIDE, SODIUM LACTATE, POTASSIUM CHLORIDE, CALCIUM CHLORIDE: 600; 310; 30; 20 INJECTION, SOLUTION INTRAVENOUS at 07:28:00

## 2023-05-17 RX ADMIN — LIDOCAINE HYDROCHLORIDE 50 MG: 10 INJECTION, SOLUTION EPIDURAL; INFILTRATION; INTRACAUDAL; PERINEURAL at 07:31:00

## 2023-05-17 NOTE — DISCHARGE INSTRUCTIONS
Home Care Instructions for Gastroscopy with Sedation    Diet:  - Resume your regular diet as tolerated unless otherwise instructed. - Start with light meals to minimize bloating.  - Do not drink alcohol today. Medication:  - If you have questions about resuming your normal medications, please contact your Primary Care Physician. Activities:  - Take it easy today. Do not return to work today. - Do not drive today. - Do not operate any machinery today (including kitchen equipment). Gastroscopy:  - You may have a sore throat for 2-3 days following the exam. This is normal. Gargling with warm salt water (1/2 tsp salt to 1 glass warm water) or using throat lozenges will help. - If you experience any sharp pain in your neck, abdomen or chest, vomiting of blood, oral temperature over 100 degrees Fahrenheit, light-headedness or dizziness, or any other problems, contact your doctor. **If unable to reach your doctor, please go to the [facility] Emergency Room**    - Your referring physician will receive a full report of your examination.  - If you do not hear from your doctor's office within two weeks of your biopsy, please call them for your results. You may be able to see your laboratory results in [facility] between 4 and 7 business days. In some cases, your physician may not have viewed the results before they are released to [address] If you have questions regarding your results contact the physician who ordered the test/exam by phone or via [address] by choosing \"Ask a Medical Question. \"

## 2023-05-17 NOTE — OPERATIVE REPORT
[address] Endoscopy Report      Date of Procedure:  05/17/23        Preoperative Diagnosis:  Nondysplastic short segment Carrasco's esophagus      Postoperative Diagnosis:  1. Irregular squamocolumnar junction/short segment Carrasco's esophagus  2. Small hiatal hernia  3. Retained food material stomach      Procedure:    Esophagogastroduodenoscopy with biopsy      Surgeon:  Nilo Carolina M.D. Anesthesia:  Monitored anesthesia care  EBL:  Insignificant      Brief History: This is a 59year old female who presents for a surveillance endoscopy in the setting of a chronic gastroesophageal reflux and nondysplastic short segment Carrasco's esophagus. Reflux symptoms are controlled with 40 mg of pantoprazole once daily. Technique:  After informed consent, the patient was placed in the left lateral recumbent position. An Olympus adult HD gastroscope was inserted into the hypopharynx and advanced under direct vision into the esophagus, stomach and duodenum. The endoscope was withdrawn and a retroflexed view of the gastric angulus, body, cardia and fundus was performed. The instrument was straightened insufflated air and fluid were suctioned and the endoscope was withdrawn. The procedure was well tolerated without immediate complication. Findings: The esophagus in its proximal and mid portions was normal.  The squamocolumnar junction was irregular with less than 5 mm areas of columnar mucosa extending above gastric folds. Biopsies were obtained. The GE junction and diaphragmatic impression were at 34/35 cm with a 1 cm sliding hiatal hernia. The stomach distended appropriately with insufflated air. There was a large amount of retained food material within the stomach which limited visualization of all gastric surfaces. The mucosa of the stomach that was able to be visualized was normal.  The pylorus was widely patent.   The duodenal bulb and post bulbar regions were normal. There was a small amount of food material present within the duodenal bulb. Impression:  1. Irregular squamocolumnar junction/short segment Carrasco's esophagus  2. Small hiatal hernia  3. Large amount of retained food material within the stomach. If the patient has properly fasted preceding the procedure this suggests delayed gastric emptying/gastroparesis perhaps augmented by the patient's GLP-1 agonist therapy. Recommendations:  1. Query regarding food intake and any gastroparetic symptoms. 2.  Continue acid suppression. 3.  Follow-up biopsy results. 4.  Further recommendations pending biopsy.       David Henning MD  5/17/2023

## 2023-05-17 NOTE — ANESTHESIA POSTPROCEDURE EVALUATION
Patient: Dong Arcos    Procedure Summary     Date: 05/17/23 Room / Location: [address] [place] [address] ENDOSCOPY 04 / 300 [facility] ENDOSCOPY    Anesthesia Start: 7166 Anesthesia Stop:     Procedure: ESOPHAGOGASTRODUODENOSCOPY (EGD) Diagnosis:       Carrasco's esophagus without dysplasia      (Short Segment Barretts Esophagus, Hiatal Hernia, Retained Food)    Surgeons: Fede Drew MD Anesthesiologist: Thuy Mckeon CRNA    Anesthesia Type: MAC ASA Status: 2          Anesthesia Type: MAC    Vitals Value Taken Time   /75 05/17/23 0747   Temp  05/17/23 0748   Pulse 75 05/17/23 0747   Resp 17 05/17/23 0747   SpO2 96 % 05/17/23 0747       [facility] AN Post Evaluation:   Patient Evaluated in Patient location: endo . Patient Participation: complete - patient participated  Level of Consciousness: awake  Pain Score: 0  Pain Management: adequate  Airway Patency:patent  Dental exam unchanged from preop  Yes    Cardiovascular Status: stable  Respiratory Status: room air and nasal cannula  Postoperative Hydration stable      Nnamdi Robison CRNA  5/17/2023 7:48 AM

## 2023-05-19 ENCOUNTER — TELEPHONE (OUTPATIENT)
Facility: CLINIC | Age: 58
End: 2023-05-19

## 2023-05-19 NOTE — TELEPHONE ENCOUNTER
Health maintenance updated.     Last colonoscopy done 5/17/2023 by Dr Jessica Skaggs    Recall placed into Pt Outreach, next due on 5/2028 per Dr. Jessica Skaggs

## 2023-06-13 ENCOUNTER — APPOINTMENT (OUTPATIENT)
Dept: GENERAL RADIOLOGY | Age: 58
End: 2023-06-13
Attending: NURSE PRACTITIONER
Payer: COMMERCIAL

## 2023-06-13 ENCOUNTER — HOSPITAL ENCOUNTER (OUTPATIENT)
Age: 58
Discharge: HOME | End: 2023-06-13
Payer: COMMERCIAL

## 2023-06-13 VITALS
HEIGHT: 63 IN | DIASTOLIC BLOOD PRESSURE: 75 MMHG | TEMPERATURE: 98 F | HEART RATE: 84 BPM | OXYGEN SATURATION: 95 % | WEIGHT: 170 LBS | BODY MASS INDEX: 30.12 KG/M2 | SYSTOLIC BLOOD PRESSURE: 103 MMHG | RESPIRATION RATE: 18 BRPM

## 2023-06-13 DIAGNOSIS — L60.9 FINGERNAIL ABNORMALITIES: Primary | ICD-10-CM

## 2023-06-13 PROCEDURE — 99203 OFFICE O/P NEW LOW 30 MIN: CPT | Performed by: NURSE PRACTITIONER

## 2023-06-13 PROCEDURE — 73140 X-RAY EXAM OF FINGER(S): CPT | Performed by: NURSE PRACTITIONER

## 2023-06-13 RX ORDER — CEFADROXIL 500 MG/1
500 CAPSULE ORAL 2 TIMES DAILY
Qty: 14 CAPSULE | Refills: 0 | Status: SHIPPED | OUTPATIENT
Start: 2023-06-13 | End: 2023-06-20

## 2023-06-13 NOTE — DISCHARGE INSTRUCTIONS
Please call Dr. Marianne Lovell to schedule an appointment  For now, take Cefadroxil 1 tablet twice per day for 7 days  If you develop red streaks up your hand, swelling, worsening pain, or any new/unusual symptoms, please go to the emergency room  Avoid nail salons until evaluated by specialist

## 2023-10-22 DIAGNOSIS — E11.22 CONTROLLED TYPE 2 DIABETES MELLITUS WITH STAGE 3 CHRONIC KIDNEY DISEASE, WITHOUT LONG-TERM CURRENT USE OF INSULIN (HCC): ICD-10-CM

## 2023-10-22 DIAGNOSIS — N18.30 CONTROLLED TYPE 2 DIABETES MELLITUS WITH STAGE 3 CHRONIC KIDNEY DISEASE, WITHOUT LONG-TERM CURRENT USE OF INSULIN (HCC): ICD-10-CM

## 2023-10-24 RX ORDER — LOSARTAN POTASSIUM 25 MG/1
25 TABLET ORAL DAILY
Qty: 90 TABLET | Refills: 3 | Status: SHIPPED | OUTPATIENT
Start: 2023-10-24

## 2023-10-24 NOTE — TELEPHONE ENCOUNTER
Future Appointments   Date Time Provider Richa Judge   10/26/2023 12:20 PM [facility] RM5 [facility] EM [facility]   11/2/2023 10:50 AM Nelsy Cross MD [facility] [place]         Please review; protocol failed/no protocol. Requested Prescriptions   Pending Prescriptions Disp Refills    LOSARTAN 25 MG Oral Tab [Pharmacy Med Name: Luiz Dill TAB 25MG] 90 tablet 1     Sig: TAKE 1 TABLET DAILY       Hypertensive Medications Protocol Failed - 10/22/2023  7:04 PM        Failed - CMP or BMP in past 6 months     No results found for this or any previous visit (from the past 4392 hour(s)).             Failed - In person appointment or virtual visit in the past 6 months     Recent Outpatient Visits              6 months ago Controlled type 2 diabetes mellitus with stage 3 chronic kidney disease, without long-term current use of insulin ([address]    Anselmo Leavitt, [facility], Brandi Wise MD    Office Visit    7 months ago Gastroesophageal reflux disease without esophagitis    [facility], [address], Heather Spencer MD    Office Visit    8 months ago Numbness in both hands    Magali Ny, Ramana Beard MD    Office Visit    10 months ago Numbness in both hands    Anselmo Leavitt, [place] [address], Moisés Kohli MD    Office Visit    10 months ago Numbness in both hands    [facility] Leavitt, [address], Ramana Beard MD    Office Visit          Future Appointments         Provider Department Appt Notes    In 3 days [facility] [address] for Health Annual mammogram    In 1 week MD Anselmo Glez, [address] [place] [address], [address] [place] [address] Annual physical and A1C check                      Passed - In person appointment in the past 12 or next 3 months     Recent Outpatient Visits              6 months ago Controlled type 2 diabetes mellitus with stage 3 chronic kidney disease, without long-term current use of insulin ([facility]    [facility] [address], Erica Holder, Rohit Bell MD    Office Visit    7 months ago Gastroesophageal reflux disease without esophagitis    [facility], [address], [place], MD [place]    Office Visit    8 months ago Numbness in both hands    [address] [facility], Avinash Sutherland MD    Office Visit    10 months ago Numbness in both hands    [address], [address], Brenda Dickerson MD    Office Visit    10 months ago Numbness in both hands    [address], [address], Avinash Sutherland MD    Office Visit          Future Appointments         Provider Department Appt Notes    In 3 days [facility] OF THE [address] Annual mammogram    In 1 week Rani Thomas MD [address],[facility] [address], [address], [place] Annual physical and A1C check                      Passed - Last BP reading less than 140/90     BP Readings from Last 1 Encounters:  06/13/23 : 103/75              Passed - EGFRCR or GFRNAA > 50     GFR Evaluation  EGFRCR: 64 , resulted on 3/30/2023                Recent Outpatient Visits              6 months ago Controlled type 2 diabetes mellitus with stage 3 chronic kidney disease, without long-term current use of insulin ([facility])    [address],[facility], [facility], Alicia Valero MD    Office Visit    7 months ago Gastroesophageal reflux disease without esophagitis    Destinee Garsia MD    Office Visit    8 months ago Numbness in both Gavidomenic Gallegos, Avinash Sutherland MD    Office Visit    10 months ago Numbness in both Gavidomenic Gallegos, Brenda Dickerson MD    Office Visit    10 months ago Numbness in both hands    [facility], [address] [place] [address] Rd,[address], Krissy Segovia MD    Office Visit             Future Appointments         Provider Department Appt Notes    In 3 days [facility] Health Annual mammogram    In 1 week MD Maude Hancock, [address], [place] Annual physical and A1C check

## 2023-10-26 ENCOUNTER — LAB ENCOUNTER (OUTPATIENT)
Dept: LAB | Facility: HOSPITAL | Age: 58
End: 2023-10-26
Attending: INTERNAL MEDICINE

## 2023-10-26 ENCOUNTER — HOSPITAL ENCOUNTER (OUTPATIENT)
Dept: MAMMOGRAPHY | Facility: HOSPITAL | Age: 58
Discharge: HOME | End: 2023-10-26
Attending: INTERNAL MEDICINE

## 2023-10-26 DIAGNOSIS — Z12.31 BREAST CANCER SCREENING BY MAMMOGRAM: ICD-10-CM

## 2023-10-26 DIAGNOSIS — E11.22 CONTROLLED TYPE 2 DIABETES MELLITUS WITH STAGE 3 CHRONIC KIDNEY DISEASE, WITHOUT LONG-TERM CURRENT USE OF INSULIN (HCC): ICD-10-CM

## 2023-10-26 DIAGNOSIS — N18.30 CONTROLLED TYPE 2 DIABETES MELLITUS WITH STAGE 3 CHRONIC KIDNEY DISEASE, WITHOUT LONG-TERM CURRENT USE OF INSULIN (HCC): ICD-10-CM

## 2023-10-26 LAB
EST. AVERAGE GLUCOSE BLD GHB EST-MCNC: 137 MG/DL (ref 68–126)
HBA1C MFR BLD: 6.4 % (ref ?–5.7)

## 2023-10-26 PROCEDURE — 77063 BREAST TOMOSYNTHESIS BI: CPT | Performed by: INTERNAL MEDICINE

## 2023-10-26 PROCEDURE — 83036 HEMOGLOBIN GLYCOSYLATED A1C: CPT

## 2023-10-26 PROCEDURE — 36415 COLL VENOUS BLD VENIPUNCTURE: CPT

## 2023-10-26 PROCEDURE — 77067 SCR MAMMO BI INCL CAD: CPT | Performed by: INTERNAL MEDICINE

## 2023-10-26 PROCEDURE — 3044F HG A1C LEVEL LT 7.0%: CPT | Performed by: INTERNAL MEDICINE

## 2023-11-02 ENCOUNTER — TELEPHONE (OUTPATIENT)
Dept: INTERNAL MEDICINE CLINIC | Facility: CLINIC | Age: 58
End: 2023-11-02

## 2023-11-02 ENCOUNTER — OFFICE VISIT (OUTPATIENT)
Dept: INTERNAL MEDICINE CLINIC | Facility: CLINIC | Age: 58
End: 2023-11-02
Payer: COMMERCIAL

## 2023-11-02 ENCOUNTER — PATIENT MESSAGE (OUTPATIENT)
Dept: INTERNAL MEDICINE CLINIC | Facility: CLINIC | Age: 58
End: 2023-11-02

## 2023-11-02 VITALS
SYSTOLIC BLOOD PRESSURE: 139 MMHG | HEART RATE: 67 BPM | WEIGHT: 177 LBS | RESPIRATION RATE: 16 BRPM | OXYGEN SATURATION: 99 % | DIASTOLIC BLOOD PRESSURE: 80 MMHG | HEIGHT: 63 IN | BODY MASS INDEX: 31.36 KG/M2

## 2023-11-02 DIAGNOSIS — K22.70 BARRETT'S ESOPHAGUS WITHOUT DYSPLASIA: ICD-10-CM

## 2023-11-02 DIAGNOSIS — E11.22 CONTROLLED TYPE 2 DIABETES MELLITUS WITH STAGE 3 CHRONIC KIDNEY DISEASE, WITHOUT LONG-TERM CURRENT USE OF INSULIN (HCC): ICD-10-CM

## 2023-11-02 DIAGNOSIS — F41.9 ANXIETY: ICD-10-CM

## 2023-11-02 DIAGNOSIS — Z63.6 CAREGIVER STRESS: ICD-10-CM

## 2023-11-02 DIAGNOSIS — N18.30 CONTROLLED TYPE 2 DIABETES MELLITUS WITH STAGE 3 CHRONIC KIDNEY DISEASE, WITHOUT LONG-TERM CURRENT USE OF INSULIN (HCC): ICD-10-CM

## 2023-11-02 DIAGNOSIS — Z00.00 ROUTINE HEALTH MAINTENANCE: Primary | ICD-10-CM

## 2023-11-02 PROCEDURE — 99396 PREV VISIT EST AGE 40-64: CPT | Performed by: INTERNAL MEDICINE

## 2023-11-02 PROCEDURE — 3008F BODY MASS INDEX DOCD: CPT | Performed by: INTERNAL MEDICINE

## 2023-11-02 PROCEDURE — 3072F LOW RISK FOR RETINOPATHY: CPT | Performed by: INTERNAL MEDICINE

## 2023-11-02 PROCEDURE — 3075F SYST BP GE 130 - 139MM HG: CPT | Performed by: INTERNAL MEDICINE

## 2023-11-02 PROCEDURE — 99214 OFFICE O/P EST MOD 30 MIN: CPT | Performed by: INTERNAL MEDICINE

## 2023-11-02 PROCEDURE — 3079F DIAST BP 80-89 MM HG: CPT | Performed by: INTERNAL MEDICINE

## 2023-11-02 RX ORDER — METFORMIN HYDROCHLORIDE 500 MG/1
500 TABLET, EXTENDED RELEASE ORAL
Qty: 90 TABLET | Refills: 1 | Status: SHIPPED | OUTPATIENT
Start: 2023-11-02 | End: 2023-11-02

## 2023-11-02 RX ORDER — SEMAGLUTIDE 1.34 MG/ML
1 INJECTION, SOLUTION SUBCUTANEOUS WEEKLY
Qty: 10 ML | Refills: 1 | Status: SHIPPED | OUTPATIENT
Start: 2023-11-02

## 2023-11-02 SDOH — SOCIAL STABILITY - SOCIAL INSECURITY: DEPENDENT RELATIVE NEEDING CARE AT HOME: Z63.6

## 2023-11-02 NOTE — TELEPHONE ENCOUNTER
Please get eye exam report from ophthalmologist in [address] [facility]:  Dr. Rafael Urias.

## 2023-11-02 NOTE — ASSESSMENT & PLAN NOTE
A1C was 6.4. The patient does not have neuropathy, nephropathy or retinopathy. Will increase Ozempic to 1 mg weekly and stop metformin.

## 2023-11-02 NOTE — PATIENT INSTRUCTIONS
My last day will  be January 11, 2024.   I recommend the following providers:    Ute Michael (Internal Medicine) [phone]  Dr. Tru Murphy (Internal Medicine):  [phone]  Monica Lee

## 2023-11-02 NOTE — ASSESSMENT & PLAN NOTE
Controlled. Continue present management. Drysol Counseling:  I discussed with the patient the risks of drysol/aluminum chloride including but not limited to skin rash, itching, irritation, burning.

## 2023-11-04 NOTE — TELEPHONE ENCOUNTER
From: Kelley Boxer  To: Conception Charles  Sent: 11/2/2023 7:00 PM CDT  Subject: Brutus Bigger Dr. Laurel Epley. Question regarding my increased dosage of Ozempic. Should I give myself two consecutive . 50ml injections until my current script runs out, continue with one . 50ml until this script runs out or, discontinue using the .50ml dose and begin using the new pen (once received)with my next injection on Monday?

## 2023-11-09 NOTE — TELEPHONE ENCOUNTER
S/rosibel King and requested for last diabetic eye exam to be faxed to us. Provided fax number to [facility] office. Inquired what the date of the last diabetic eye exam was, and she stts that most of her office visits were for her annual exam.  I was wondering if that included the diabetic eye exam, and we will have to look at the office notes when we receive it but she will let the office know what we are looking for.

## 2023-11-14 NOTE — TELEPHONE ENCOUNTER
Spoke to [place] once more and was told she would have her back office fax it to us, provided fax number [phone].

## 2023-12-19 DIAGNOSIS — K21.9 GASTROESOPHAGEAL REFLUX DISEASE: ICD-10-CM

## 2023-12-19 RX ORDER — PANTOPRAZOLE SODIUM 40 MG/1
40 TABLET, DELAYED RELEASE ORAL EVERY MORNING
Qty: 90 TABLET | Refills: 2 | Status: SHIPPED | OUTPATIENT
Start: 2023-12-19

## 2023-12-19 NOTE — TELEPHONE ENCOUNTER
Refill passed per [place] [employer], [facility] protocol.     Requested Prescriptions   Pending Prescriptions Disp Refills    PANTOPRAZOLE 40 MG Oral Tab EC [Pharmacy Med Name: PANTOPRAZOLE TAB 40MG DR] 90 tablet 1     Sig: TAKE 1 TABLET EVERY MORNING       Gastrointestional Medication Protocol Passed - 2023  1:20 AM        Passed - In person appointment or virtual visit in the past 12 mos or appointment in next 3 mos     Recent Outpatient Visits              1 month ago Routine health maintenance    [address], [facility]    Office Visit    8 months ago Controlled type 2 diabetes mellitus with stage 3 chronic kidney disease, without long-term current use of insulin ([facility])    [address], [facility]    Office Visit    9 months ago Gastroesophageal reflux disease without esophagitis    Jordan Saunders MD    Office Visit    10 months ago Numbness in both hands    [address], [address] [facility],[address], [address] MD    Office Visit    1 year ago Numbness in both Mauro Mackay MD    Office Visit          Future Appointments         Provider Department Appt Notes    In 1 month Lilliana Flores MD [address], [address] [facility] [address]                   Future Appointments         Provider Department Appt Notes    In 1 month Lilliana Flores MD [address], [address] [facility] [address]           Recent Outpatient Visits              1 month ago Routine health maintenance    Marlin Brown MD    Office Visit    8 months ago Controlled type 2 diabetes mellitus with stage 3 chronic kidney disease, without long-term current use of insulin [facility]    [address], [facility] Marlin Randall MD Office Visit    9 months ago Gastroesophageal reflux disease without esophagitis    Kayla Canela MD    Office Visit    10 months ago Numbness in both Roby Garcia MD    Office Visit    1 year ago Numbness in both Nichelle Huang, Amilcar Herbert MD    Office Visit

## 2024-01-09 ENCOUNTER — TELEPHONE (OUTPATIENT)
Dept: NEUROLOGY | Facility: CLINIC | Age: 59
End: 2024-01-09

## 2024-01-09 DIAGNOSIS — M25.531 RIGHT WRIST PAIN: Primary | ICD-10-CM

## 2024-01-09 NOTE — TELEPHONE ENCOUNTER
Patient calling to speak to a nurse she recently had a minor accident and hurt her R-Wrist wondering if Dr.Behar can place on order for on X-Ray or does she has to schedule on in office apt to see him first.

## 2024-01-10 NOTE — TELEPHONE ENCOUNTER
Spoke with patient states she tripped going into her house. Fell on knee and right hand. C/o the right wrist into hand and into armpit and into shoulder with N/T at nighttime.   Unable to flex, extend, abduct and adduct without pain.   Rates pain 4/10.     NOV: 1/22/2024

## 2024-01-12 NOTE — TELEPHONE ENCOUNTER
OK to put Xr order for wrist. Please have her schedule a follow up appointment. If I see a fracture, I will call her.    Alex B. Behar MD, [facility]  Physical Medicine and Rehabilitation/[facility]  [facility]

## 2024-01-18 ENCOUNTER — HOSPITAL ENCOUNTER (OUTPATIENT)
Dept: GENERAL RADIOLOGY | Age: 59
Discharge: HOME | End: 2024-01-18
Attending: PHYSICAL MEDICINE & REHABILITATION
Payer: COMMERCIAL

## 2024-01-18 DIAGNOSIS — M25.531 RIGHT WRIST PAIN: ICD-10-CM

## 2024-01-18 PROCEDURE — 73110 X-RAY EXAM OF WRIST: CPT | Performed by: PHYSICAL MEDICINE & REHABILITATION

## 2024-01-18 NOTE — TELEPHONE ENCOUNTER
Patient called to see if xray order had been placed, this PSR informed the patient that there is currently no order on file. Patient states that she is trying to get xray done before 1/22 appointment. Please advise.

## 2024-01-22 ENCOUNTER — OFFICE VISIT (OUTPATIENT)
Dept: PHYSICAL MEDICINE AND REHAB | Facility: CLINIC | Age: 59
End: 2024-01-22
Payer: COMMERCIAL

## 2024-01-22 VITALS — BODY MASS INDEX: 31.36 KG/M2 | WEIGHT: 177 LBS | HEIGHT: 63 IN

## 2024-01-22 DIAGNOSIS — S63.501A SPRAIN OF RIGHT WRIST, INITIAL ENCOUNTER: ICD-10-CM

## 2024-01-22 DIAGNOSIS — M25.831 ULNAR IMPACTION SYNDROME, RIGHT: ICD-10-CM

## 2024-01-22 DIAGNOSIS — G56.03 BILATERAL CARPAL TUNNEL SYNDROME: ICD-10-CM

## 2024-01-22 DIAGNOSIS — S69.81XA INJURY OF TRIANGULAR FIBROCARTILAGE COMPLEX (TFCC) OF RIGHT WRIST, INITIAL ENCOUNTER: ICD-10-CM

## 2024-01-22 DIAGNOSIS — R20.0 NUMBNESS IN BOTH HANDS: Primary | ICD-10-CM

## 2024-01-22 PROCEDURE — 3008F BODY MASS INDEX DOCD: CPT | Performed by: PHYSICAL MEDICINE & REHABILITATION

## 2024-01-22 PROCEDURE — 99214 OFFICE O/P EST MOD 30 MIN: CPT | Performed by: PHYSICAL MEDICINE & REHABILITATION

## 2024-01-22 NOTE — PATIENT INSTRUCTIONS
10 start occupational therapy at [facility] in [place]. Please make sure they create a splint for your hands which you can use at night while sleeping  2) depending on how therapy goes will be if we decide to perform carpal tunnel injections for numbness and tingling in the hands  3) We can consider an MRI of the right wrist if symptoms do not improve with OT.   4) Tylenol 500-1000 mg every 6-8 hours as needed for pain.  No more than 3000 mg daily.  5)  Ice 20 minutes at a time 3-4 times per day

## 2024-01-23 NOTE — PROGRESS NOTES
[facility]  Progress Note    CHIEF COMPLAINT:    Chief Complaint   Patient presents with    Hand Pain     LOV: 1/31/2023 pt comes in to f/u on R hand and wrist injury. C/o aching pain in R medial hand and wrist. Admits T/N in bilateral hands when sleeping. Denies weakness. Rates pain 6/10. Denies medication. Completed XR of R wrist.        History of Present Illness:  The patient is a 59 year old handed female with a significant past medical history of hypertension and diabetes who presents with right wrist pain mostly in the ulnar side after a fall where she landed on an extended hand.  This occurred sometime in the middle of December.  She rates her pain a 6 out of 10 mostly in the wrist and proximal carpal bones.  She did have x-rays of the right wrist performed which I dependently reviewed.  She is not taking any medications for pain.  No therapies.  No wrist splints.  She is also having numbness and tingling in both hands.  No occupational therapy has been performed.    PAST MEDICAL HISTORY:  Past Medical History:   Diagnosis Date    Diabetes (HCC)     Esophageal reflux     High blood pressure     High cholesterol        SURGICAL HISTORY:  Past Surgical History:   Procedure Laterality Date    ADENOIDECTOMY      COLONOSCOPY N/A 10/31/2017    Procedure: COLONOSCOPY;  Surgeon: Jamal Bower MD;  Location: [facility] ENDOSCOPY    COLONOSCOPY N/A 12/28/2019    Procedure: COLONOSCOPY;  Surgeon: Jamal Bower MD;  Location: [facility] ENDOSCOPY    D & C      KEVIN BIOPSY STEREO NODULE 1 SITE RIGHT (CPT=19081)      [facility] 2013    OTHER SURGICAL HISTORY      per NG: excision lession, local    TONSILLECTOMY         SOCIAL HISTORY:   Social History     Occupational History    Not on file   Tobacco Use    Smoking status: Former    Smokeless tobacco: Never    Tobacco comments:     Smoked socially, quit 35+ years ago   Vaping Use    Vaping Use: Never used   Substance and  Sexual Activity    Alcohol use: Yes     Comment: Social drinker - 5-10/month    Drug use: Never    Sexual activity: Yes       FAMILY HISTORY:   Family History   Problem Relation Age of Onset    Cancer Father         bladder    Hypertension Father     Gastro-Intestinal Disorder Other         family h/o diverticular disease    Dementia Mother         Mother is 93/yo with noticeable signs of Dementia    Cancer Maternal Grandmother         stomach cancer    Diabetes Paternal Grandmother     Gastro-Intestinal Disorder Brother     Other (Other) Brother        CURRENT MEDICATIONS:   Current Outpatient Medications   Medication Sig Dispense Refill    pantoprazole 40 MG Oral Tab EC Take 1 tablet (40 mg total) by mouth every morning. 90 tablet 2    semaglutide (OZEMPIC, 1 MG/DOSE,) 4 MG/3ML Subcutaneous Solution Pen-injector Inject 1 mg into the skin once a week. 10 mL 1    losartan 25 MG Oral Tab Take 1 tablet (25 mg total) by mouth daily. 90 tablet 3    atorvastatin 10 MG Oral Tab Take 1 tablet (10 mg total) by mouth daily. 90 tablet 3    sertraline 50 MG Oral Tab Take 1 tablet (50 mg total) by mouth every morning. 90 tablet 3    ONETOUCH ULTRA In Vitro Strip daily.      loratadine 10 MG Oral Tab       multivitamin Oral Tab Take 1 tablet by mouth daily.         ALLERGIES:   No Known Allergies    REVIEW OF SYSTEMS:   Review of Systems   Constitutional: Negative.    HENT: Negative.    Eyes: Negative.    Respiratory: Negative.    Cardiovascular: Negative.    Gastrointestinal: Negative.    Genitourinary: Negative.    Musculoskeletal: As per HPI  Skin: Negative.    Neurological: As per HPI  Endo/Heme/Allergies: Negative.    Psychiatric/Behavioral: Negative.      All other systems reviewed and are negative. Pertinent positives and negatives noted in the HPI.        PHYSICAL EXAM:   Ht 63\"   Wt 177 lb (80.3 kg)   LMP  (LMP Unknown)   BMI 31.35 kg/m²     Body mass index is 31.35 kg/m².      General: No immediate distress  Head:  Normocephalic/ Atraumatic  Eyes: Extra-occular movements intact.   Ears: No auricular hematoma or deformities  Mouth: No lesions or ulcerations  Heart: peripheral pulses intact. Normal capillary refill.   Lungs: Non-labored respirations  Abdomen: No abdominal guarding  Extremities: No lower extremity edema bilaterally   Skin: No lesions noted.   Cognition: alert & oriented x 3, attentive, able to follow 2 step commands, comprehention intact, spontaneous speech intact  Motor:    Musculoskeletal:    WRIST/HAND:  Inspection: no erythema, swelling, or obvious deformity  Palpation: Tender to palpation over the right carpal bones primarily of the lunate and pisiform.  ROM: intact to all planes of motion  Strength: 5 out of 5 in all myotomes of the upper extremity  Sensation: Intact to light touch in all dermatomes of the upper extremities  Reflexes: 2/4 at C5, C6, C7  Phalens Test: negative for reproducible symptoms  Reverse Phalens: negative for reproducible symptoms  Tinel's test: Positive bilaterally at the wrist      Data  [facility] Lab Encounter on 10/26/2023   Component Date Value Ref Range Status    HgbA1C 10/26/2023 6.4 (H)  <5.7 % Final    Estimated Average Glucose 10/26/2023 137 (H)  68 - 126 mg/dL Final   ]      Radiology Imaging:  I reviewed with the patient her X-ray of the wrist right from 1/18/2024  XR WRIST COMPLETE (MIN 3 VIEWS), RIGHT (CPT=73110)  Narrative: PROCEDURE: XR WRIST COMPLETE (MIN 3 VIEWS), RIGHT (CPT=73110)     COMPARISON: [facility], XR WRIST COMPLETE (MIN 3 VIEWS), RIGHT (CPT=73110), 11/04/2021, 11:13 AM.     INDICATIONS: Right wrist pain post fall 2-3 weeks ago.     TECHNIQUE: 3 views were obtained.       FINDINGS:   BONES: There is positive ulnar variance with subchondral cystic change seen within the ulnar aspect of the lunate which can be seen with ulnar impaction syndrome.  There is positive ulnar variance by 2 mm. The joint spaces are otherwise maintained. There    is no acute fracture or dislocation.    SOFT TISSUES: Negative. No visible soft tissue swelling.   EFFUSION: None visible.   OTHER: Negative.               Impression: CONCLUSION:      Imaging findings suggestive of ulnar impaction syndrome.      No acute fracture or dislocation within the wrist.             Dictated by (CST): Michael Painter MD on 1/18/2024 at 8:20 PM       Finalized by (CST): Michael Painter MD on 1/18/2024 at 8:22 PM              ASSESSMENT AND PLAN:  Nita is a pleasant 59-year-old female who presents with right wrist pain which I believe is due to a wrist sprain and contusion with a TFCC sprain.  I am recommending she begin occupational therapy.  I have reviewed her most recent x-rays where she does have a positive ulnar variance.  Depending on how therapy goes will be if we decide to perform carpal tunnel corticosteroid injections as she is also having numbness and tingling in the hands.  She should use resting wrist splints and I have asked Occupational Therapy to make these for her.  We can also consider an MRI of the right wrist if she does not improve with occupational therapy.  She should continue to use Tylenol for pain and ice.       RTC in 2 months  Discharge Instructions were provided as documented in AVS summary.  The patient was in agreement with the assessment and plan.  All questions were answered.  There were no barriers to learning.         1. Numbness in both hands    2. Bilateral carpal tunnel syndrome    3. Ulnar impaction syndrome, right    4. Sprain of right wrist, initial encounter    5. Injury of triangular fibrocartilage complex (TFCC) of right wrist, initial encounter        Alex B. Behar MD  Physical Medicine and Rehabilitation/Sports Medicine  [facility]

## 2024-02-01 ENCOUNTER — OFFICE VISIT (OUTPATIENT)
Dept: INTERNAL MEDICINE CLINIC | Facility: CLINIC | Age: 59
End: 2024-02-01
Payer: COMMERCIAL

## 2024-02-01 ENCOUNTER — MED REC SCAN ONLY (OUTPATIENT)
Dept: PHYSICAL MEDICINE AND REHAB | Facility: CLINIC | Age: 59
End: 2024-02-01

## 2024-02-01 VITALS
OXYGEN SATURATION: 97 % | HEART RATE: 72 BPM | SYSTOLIC BLOOD PRESSURE: 100 MMHG | HEIGHT: 63 IN | BODY MASS INDEX: 30.37 KG/M2 | WEIGHT: 171.38 LBS | DIASTOLIC BLOOD PRESSURE: 69 MMHG

## 2024-02-01 DIAGNOSIS — K22.70 BARRETT'S ESOPHAGUS WITHOUT DYSPLASIA: ICD-10-CM

## 2024-02-01 DIAGNOSIS — N18.30 CONTROLLED TYPE 2 DIABETES MELLITUS WITH STAGE 3 CHRONIC KIDNEY DISEASE, WITHOUT LONG-TERM CURRENT USE OF INSULIN (HCC): Primary | ICD-10-CM

## 2024-02-01 DIAGNOSIS — E78.00 HYPERCHOLESTEROLEMIA: ICD-10-CM

## 2024-02-01 DIAGNOSIS — Z63.6 CAREGIVER STRESS: ICD-10-CM

## 2024-02-01 DIAGNOSIS — E11.22 CONTROLLED TYPE 2 DIABETES MELLITUS WITH STAGE 3 CHRONIC KIDNEY DISEASE, WITHOUT LONG-TERM CURRENT USE OF INSULIN (HCC): Primary | ICD-10-CM

## 2024-02-01 DIAGNOSIS — E66.9 OBESITY, CLASS I, BMI 30-34.9: ICD-10-CM

## 2024-02-01 DIAGNOSIS — F41.9 ANXIETY: ICD-10-CM

## 2024-02-01 PROCEDURE — 3008F BODY MASS INDEX DOCD: CPT | Performed by: INTERNAL MEDICINE

## 2024-02-01 PROCEDURE — 3074F SYST BP LT 130 MM HG: CPT | Performed by: INTERNAL MEDICINE

## 2024-02-01 PROCEDURE — 99214 OFFICE O/P EST MOD 30 MIN: CPT | Performed by: INTERNAL MEDICINE

## 2024-02-01 PROCEDURE — 3078F DIAST BP <80 MM HG: CPT | Performed by: INTERNAL MEDICINE

## 2024-02-01 SDOH — SOCIAL STABILITY - SOCIAL INSECURITY: DEPENDENT RELATIVE NEEDING CARE AT HOME: Z63.6

## 2024-02-01 NOTE — PROGRESS NOTES
Paula Gordon is a 59 year old female who is here for  1. Controlled type 2 diabetes mellitus with stage 3 chronic kidney disease, without long-term current use of insulin (HCC)    2. Carrasco's esophagus without dysplasia    3. Caregiver stress    4. Anxiety    5. Hypercholesterolemia    6. Obesity, Class I, BMI 30-34.9          HPI:   Paula Gordon is a 59 year old lady presents to the office to establish care.   She was a patient of Dr. Patel, who now is retired.   She denies any complaints today.  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    Past Medical History:   Diagnosis Date    Diabetes (HCC)     Esophageal reflux     High blood pressure     High cholesterol      Past Surgical History:   Procedure Laterality Date    ADENOIDECTOMY      COLONOSCOPY N/A 10/31/2017    Procedure: COLONOSCOPY;  Surgeon: Jamal Bower MD;  Location: [facility] ENDOSCOPY    COLONOSCOPY N/A 12/28/2019    Procedure: COLONOSCOPY;  Surgeon: Jamal Bower MD;  Location: [facility] ENDOSCOPY    D & C      KEVIN BIOPSY STEREO NODULE 1 SITE RIGHT (CPT=19081)      [facility] 2013    OTHER SURGICAL HISTORY      per NG: excision lession, local    TONSILLECTOMY         Current Outpatient Medications:     pantoprazole 40 MG Oral Tab EC, Take 1 tablet (40 mg total) by mouth every morning., Disp: 90 tablet, Rfl: 2    semaglutide (OZEMPIC, 1 MG/DOSE,) 4 MG/3ML Subcutaneous Solution Pen-injector, Inject 1 mg into the skin once a week., Disp: 10 mL, Rfl: 1    losartan 25 MG Oral Tab, Take 1 tablet (25 mg total) by mouth daily., Disp: 90 tablet, Rfl: 3    atorvastatin 10 MG Oral Tab, Take 1 tablet (10 mg total) by mouth daily., Disp: 90 tablet, Rfl: 3    sertraline 50 MG Oral Tab, Take 1 tablet (50 mg total) by mouth every morning., Disp: 90 tablet, Rfl: 3    ONETOUCH ULTRA In Vitro Strip, daily., Disp: , Rfl:     loratadine 10 MG Oral Tab, , Disp: , Rfl:     multivitamin Oral Tab,  Take 1 tablet by mouth daily., Disp: , Rfl:     Allergies:No Known Allergies  Social History     Socioeconomic History    Marital status:      Spouse name: Not on file    Number of children: Not on file    Years of education: Not on file    Highest education level: Not on file   Occupational History    Not on file   Tobacco Use    Smoking status: Former    Smokeless tobacco: Never    Tobacco comments:     Smoked socially, quit 35+ years ago   Vaping Use    Vaping Use: Never used   Substance and Sexual Activity    Alcohol use: Yes     Comment: Social drinker - 5-10/month    Drug use: Never    Sexual activity: Yes   Other Topics Concern     Service Not Asked    Blood Transfusions Not Asked    Caffeine Concern Yes     Comment: Daily    Occupational Exposure Not Asked    Hobby Hazards Not Asked    Sleep Concern Not Asked    Stress Concern Not Asked    Weight Concern Not Asked    Special Diet Not Asked    Back Care Not Asked    Exercise Yes     Comment: walking    Bike Helmet Not Asked    Seat Belt Not Asked    Self-Exams Not Asked   Social History Narrative    The patient does not use an assistive device..      The patient does live in a home with stairs.     Social Determinants of Health     Financial Resource Strain: Not on file   Food Insecurity: Not on file   Transportation Needs: Not on file   Physical Activity: Not on file   Stress: Not on file   Social Connections: Not on file   Housing Stability: Not on file       REVIEW OF SYSTEMS:     GENERAL HEALTH: No fevers, chills, sweats, fatigue  VISION: No recent vision problems, blurry vision or double vision  HEENT: No decreased hearing ear pain nasal congestion or sore throat  SKIN: denies any unusual skin lesions or rashes  RESPIRATORY: denies shortness of breath, cough, wheezing  CARDIOVASCULAR: denies chest pain on exertion, palpitations, swelling in feet  GI: denies abdominal pain and denies heartburn, nausea or vomiting  : No Pain on urination,  change in the color of urine, discharge, urinating frequently  MUS: No back pain, joint pain, muscle pain  NEURO: denies headaches , anxiety, depression    EXAM:     Vitals:    02/01/24 1420   BP: 100/69   Pulse: 72   SpO2: 97%   Weight: 171 lb 6.4 oz (77.7 kg)   Height: 5' 3\" (1.6 m)     GENERAL: well developed, well nourished,in no apparent distress  SKIN: no rashes,no suspicious lesions  HEENT: atraumatic, normocephalic,ears and throat are clear,   NECK: supple,no adenopathy,  LUNGS: clear to auscultation, no wheeze  CARDIO: RRR without murmur  GI: good BS's,no masses or tenderness  EXTREMITIES: no cyanosis, or edema    ASSESSMENT AND PLAN:   1. Controlled type 2 diabetes mellitus with stage 3 chronic kidney disease, without long-term current use of insulin (HCC)  -was on metformin, discontinued when ozempic was titrated up  -A1c 6.4 10/2023  Plan:  -currently on Ozempic 1 mg    2. Carrasco's esophagus without dysplasia  -EGD 5/2023 with Dr. Westbrook 1.  Irregular squamocolumnar junction/short segment Carrasco's esophagus 2.  Small hiatal hernia  3.  Large amount of retained food material within the stomach.  If the patient has properly fasted preceding the procedure this suggests delayed gastric emptying/gastroparesis perhaps augmented by the patient's GLP-1 agonist therapy.  Plan:  -stable continue omeprazole    3. Caregiver stress  4. 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 Hypercholesterolemia  -on atv 10 mg    6. Obesity, Class I, BMI 30-34.9  -BMI 30.36  -Ozempic is helping her lose weight  -lost about 25 lbs since she started      The patient indicates understanding of these issues and agrees to the plan.    Time spent on this encounter including face to face and chart review 45  minutes.       Return in about 3 months (around 5/1/2024).      Stephanie Anne MD  2/1/2024

## 2024-03-04 ENCOUNTER — OFFICE VISIT (OUTPATIENT)
Dept: INTERNAL MEDICINE CLINIC | Facility: CLINIC | Age: 59
End: 2024-03-04
Payer: COMMERCIAL

## 2024-03-04 VITALS
OXYGEN SATURATION: 96 % | TEMPERATURE: 97 F | WEIGHT: 167 LBS | HEART RATE: 83 BPM | HEIGHT: 63 IN | SYSTOLIC BLOOD PRESSURE: 107 MMHG | DIASTOLIC BLOOD PRESSURE: 73 MMHG | BODY MASS INDEX: 29.59 KG/M2

## 2024-03-04 DIAGNOSIS — R06.2 WHEEZING: ICD-10-CM

## 2024-03-04 DIAGNOSIS — J20.9 ACUTE BRONCHITIS, UNSPECIFIED ORGANISM: Primary | ICD-10-CM

## 2024-03-04 PROCEDURE — 3074F SYST BP LT 130 MM HG: CPT | Performed by: INTERNAL MEDICINE

## 2024-03-04 PROCEDURE — 3078F DIAST BP <80 MM HG: CPT | Performed by: INTERNAL MEDICINE

## 2024-03-04 PROCEDURE — 3008F BODY MASS INDEX DOCD: CPT | Performed by: INTERNAL MEDICINE

## 2024-03-04 PROCEDURE — 99214 OFFICE O/P EST MOD 30 MIN: CPT | Performed by: INTERNAL MEDICINE

## 2024-03-04 RX ORDER — ALBUTEROL SULFATE 90 UG/1
2 AEROSOL, METERED RESPIRATORY (INHALATION) EVERY 6 HOURS PRN
Qty: 1 EACH | Refills: 1 | Status: SHIPPED | OUTPATIENT
Start: 2024-03-04

## 2024-03-04 RX ORDER — BENZONATATE 100 MG/1
100 CAPSULE ORAL 3 TIMES DAILY PRN
Qty: 30 CAPSULE | Refills: 0 | Status: SHIPPED | OUTPATIENT
Start: 2024-03-04

## 2024-03-04 NOTE — PROGRESS NOTES
Paula Gordon is a 59 year old female who is here for  1. Acute bronchitis, unspecified organism    2. Wheezing          HPI:   Paula Gordon is a 59 year old female presents with sore throat.   Symptoms started with scratchy throat last week  +Nausea but no vomiting, decreased appetite  Saturday with dry cough  Chills and sweats, not aware of a fever  Malaise and myalgia  Took Nyquil   No sick contacts, no travels    Past Medical History:   Diagnosis Date    Diabetes (HCC)     Esophageal reflux     High blood pressure     High cholesterol      Past Surgical History:   Procedure Laterality Date    ADENOIDECTOMY      COLONOSCOPY N/A 10/31/2017    Procedure: COLONOSCOPY;  Surgeon: Jamal Bower MD;  Location: [facility] ENDOSCOPY    COLONOSCOPY N/A 12/28/2019    Procedure: COLONOSCOPY;  Surgeon: Jamal Bower MD;  Location: [facility] ENDOSCOPY    D & C      KEVIN BIOPSY STEREO NODULE 1 SITE RIGHT (CPT=19081)      [facility] 2013    OTHER SURGICAL HISTORY      per NG: excision lession, local    TONSILLECTOMY         Current Outpatient Medications:     albuterol 108 (90 Base) MCG/ACT Inhalation Aero Soln, Inhale 2 puffs into the lungs every 6 (six) hours as needed for Wheezing or Shortness of Breath., Disp: 1 each, Rfl: 1    benzonatate 100 MG Oral Cap, Take 1 capsule (100 mg total) by mouth 3 (three) times daily as needed for cough., Disp: 30 capsule, Rfl: 0    pantoprazole 40 MG Oral Tab EC, Take 1 tablet (40 mg total) by mouth every morning., Disp: 90 tablet, Rfl: 2    semaglutide (OZEMPIC, 1 MG/DOSE,) 4 MG/3ML Subcutaneous Solution Pen-injector, Inject 1 mg into the skin once a week., Disp: 10 mL, Rfl: 1    losartan 25 MG Oral Tab, Take 1 tablet (25 mg total) by mouth daily., Disp: 90 tablet, Rfl: 3    atorvastatin 10 MG Oral Tab, Take 1 tablet (10 mg total) by mouth daily., Disp: 90 tablet, Rfl: 3    sertraline 50 MG Oral Tab, Take 1 tablet (50 mg total) by mouth every morning., Disp: 90 tablet,  Rfl: 3    ONETOUCH ULTRA In Vitro Strip, daily., Disp: , Rfl:     loratadine 10 MG Oral Tab, , Disp: , Rfl:     multivitamin Oral Tab, Take 1 tablet by mouth daily., Disp: , Rfl:     Allergies:No Known Allergies  Social History     Socioeconomic History    Marital status:      Spouse name: Not on file    Number of children: Not on file    Years of education: Not on file    Highest education level: Not on file   Occupational History    Not on file   Tobacco Use    Smoking status: Former    Smokeless tobacco: Never    Tobacco comments:     Smoked socially, quit 35+ years ago   Vaping Use    Vaping Use: Never used   Substance and Sexual Activity    Alcohol use: Yes     Comment: Social drinker - 5-10/month    Drug use: Never    Sexual activity: Yes   Other Topics Concern     Service Not Asked    Blood Transfusions Not Asked    Caffeine Concern Yes     Comment: Daily    Occupational Exposure Not Asked    Hobby Hazards Not Asked    Sleep Concern Not Asked    Stress Concern Not Asked    Weight Concern Not Asked    Special Diet Not Asked    Back Care Not Asked    Exercise Yes     Comment: walking    Bike Helmet Not Asked    Seat Belt Not Asked    Self-Exams Not Asked   Social History Narrative    The patient does not use an assistive device..      The patient does live in a home with stairs.     Social Determinants of Health     Financial Resource Strain: Not on file   Food Insecurity: Not on file   Transportation Needs: Not on file   Physical Activity: Not on file   Stress: Not on file   Social Connections: Not on file   Housing Stability: Not on file       REVIEW OF SYSTEMS:     GENERAL HEALTH: No fevers, chills,+ sweats, +fatigue  VISION: No recent vision problems, blurry vision or double vision  HEENT: No decreased hearing ear pain nasal congestion or sore throat  SKIN: denies any unusual skin lesions or rashes  RESPIRATORY: +cough, wheezing  CARDIOVASCULAR: denies chest pain on exertion, palpitations,  swelling in feet  GI: denies abdominal pain and denies heartburn, nausea or vomiting  : No Pain on urination, change in the color of urine, discharge, urinating frequently  MUS: No back pain, joint pain, muscle pain  NEURO: denies headaches , anxiety, depression    EXAM:     Vitals:    03/04/24 1019   BP: 107/73   Pulse: 83   Temp: 97 °F (36.1 °C)   TempSrc: Temporal   SpO2: 96%   Weight: 167 lb (75.8 kg)   Height: 5' 3\" (1.6 m)     GENERAL: well developed, well nourished,in no apparent distress  SKIN: no rashes,no suspicious lesions  HEENT: atraumatic, normocephalic,ears and throat are clear,   NECK: supple,no adenopathy,  LUNGS: clear to auscultation, +wheeze  CARDIO: RRR without murmur  GI: good BS's,no masses or tenderness  EXTREMITIES: no cyanosis, or edema    ASSESSMENT AND PLAN:   1. Acute bronchitis, unspecified organism  2. Wheezing  -cough and body aches since last week  -symptoms mildly improved  -still with dry cough and wheezing  Plan  - albuterol 108 (90 Base) MCG/ACT Inhalation Aero Soln; Inhale 2 puffs into the lungs every 6 (six) hours as needed for Wheezing or Shortness of Breath.  Dispense: 1 each; Refill: 1  - benzonatate 100 MG Oral Cap; Take 1 capsule (100 mg total) by mouth 3 (three) times daily as needed for cough.  Dispense: 30 capsule; Refill: 0  - SARS-CoV-2/Flu A and B/RSV by PCR ([facility]); Future      The patient indicates understanding of these issues and agrees to the plan.    Return if symptoms worsen or fail to improve.        Stephanie Anne MD  3/4/2024

## 2024-03-05 LAB
FLUAV + FLUBV RNA SPEC NAA+PROBE: NOT DETECTED
FLUAV + FLUBV RNA SPEC NAA+PROBE: NOT DETECTED
RSV RNA SPEC NAA+PROBE: NOT DETECTED
SARS-COV-2 RNA RESP QL NAA+PROBE: NOT DETECTED

## 2024-03-11 ENCOUNTER — MED REC SCAN ONLY (OUTPATIENT)
Dept: PHYSICAL MEDICINE AND REHAB | Facility: CLINIC | Age: 59
End: 2024-03-11

## 2024-03-14 ENCOUNTER — OFFICE VISIT (OUTPATIENT)
Dept: PHYSICAL MEDICINE AND REHAB | Facility: CLINIC | Age: 59
End: 2024-03-14
Payer: COMMERCIAL

## 2024-03-14 VITALS — WEIGHT: 168 LBS | HEIGHT: 63 IN | BODY MASS INDEX: 29.77 KG/M2

## 2024-03-14 DIAGNOSIS — M25.831 ULNAR IMPACTION SYNDROME, RIGHT: ICD-10-CM

## 2024-03-14 DIAGNOSIS — S69.81XA INJURY OF TRIANGULAR FIBROCARTILAGE COMPLEX (TFCC) OF RIGHT WRIST, INITIAL ENCOUNTER: Primary | ICD-10-CM

## 2024-03-14 PROCEDURE — 3008F BODY MASS INDEX DOCD: CPT | Performed by: PHYSICAL MEDICINE & REHABILITATION

## 2024-03-14 PROCEDURE — 99214 OFFICE O/P EST MOD 30 MIN: CPT | Performed by: PHYSICAL MEDICINE & REHABILITATION

## 2024-03-14 NOTE — PROGRESS NOTES
[facility]  Progress Note    CHIEF COMPLAINT:    Chief Complaint   Patient presents with    Numbness     LOV: 1/22/2024 pt comes in with aching pain in R wrist. Intermittent T/N. Admits weakness. Rates pain 4/10. Admits overall improvement. Currently in OT. Denies medication.        History of Present Illness:  The patient is a 59 year old handed female with a significant past medical history of hypertension diabetes presents for right ulnar wrist pain.  She has continued with occupational therapy and continues to improve especially as a pertains to the numbness and tingling in her hands.  She is mostly complaining of the ulnar-sided wrist pain which she rates about a 4 out of 10 especially during radial and ulnar deviation.  She is currently in occupational therapy.  I have discussed her case with her occupational therapist, Benjamin.    PAST MEDICAL HISTORY:  Past Medical History:   Diagnosis Date    Diabetes (HCC)     Esophageal reflux     High blood pressure     High cholesterol        SURGICAL HISTORY:  Past Surgical History:   Procedure Laterality Date    ADENOIDECTOMY      COLONOSCOPY N/A 10/31/2017    Procedure: COLONOSCOPY;  Surgeon: Jamal Bower MD;  Location: [facility] ENDOSCOPY    COLONOSCOPY N/A 12/28/2019    Procedure: COLONOSCOPY;  Surgeon: Jamal Bower MD;  Location: [facility] ENDOSCOPY    D & C      KEVIN BIOPSY STEREO NODULE 1 SITE RIGHT (CPT=19081)      samira 2013    OTHER SURGICAL HISTORY      per NG: excision lession, local    TONSILLECTOMY         SOCIAL HISTORY:   Social History     Occupational History    Not on file   Tobacco Use    Smoking status: Former    Smokeless tobacco: Never    Tobacco comments:     Smoked socially, quit 35+ years ago   Vaping Use    Vaping Use: Never used   Substance and Sexual Activity    Alcohol use: Yes     Comment: Social drinker - 5-10/month    Drug use: Never    Sexual activity: Yes       FAMILY HISTORY:    Family History   Problem Relation Age of Onset    Cancer Father         bladder    Hypertension Father     Gastro-Intestinal Disorder Other         family h/o diverticular disease    Dementia Mother         Mother is 93/yo with noticeable signs of Dementia    Cancer Maternal Grandmother         stomach cancer    Diabetes Paternal Grandmother     Gastro-Intestinal Disorder Brother     Other (Other) Brother        CURRENT MEDICATIONS:   Current Outpatient Medications   Medication Sig Dispense Refill    albuterol 108 (90 Base) MCG/ACT Inhalation Aero Soln Inhale 2 puffs into the lungs every 6 (six) hours as needed for Wheezing or Shortness of Breath. 1 each 1    benzonatate 100 MG Oral Cap Take 1 capsule (100 mg total) by mouth 3 (three) times daily as needed for cough. 30 capsule 0    pantoprazole 40 MG Oral Tab EC Take 1 tablet (40 mg total) by mouth every morning. 90 tablet 2    semaglutide (OZEMPIC, 1 MG/DOSE,) 4 MG/3ML Subcutaneous Solution Pen-injector Inject 1 mg into the skin once a week. 10 mL 1    losartan 25 MG Oral Tab Take 1 tablet (25 mg total) by mouth daily. 90 tablet 3    atorvastatin 10 MG Oral Tab Take 1 tablet (10 mg total) by mouth daily. 90 tablet 3    sertraline 50 MG Oral Tab Take 1 tablet (50 mg total) by mouth every morning. 90 tablet 3    ONETOUCH ULTRA In Vitro Strip daily.      loratadine 10 MG Oral Tab       multivitamin Oral Tab Take 1 tablet by mouth daily.         ALLERGIES:   No Known Allergies    REVIEW OF SYSTEMS:   Review of Systems   Constitutional: Negative.    HENT: Negative.    Eyes: Negative.    Respiratory: Negative.    Cardiovascular: Negative.    Gastrointestinal: Negative.    Genitourinary: Negative.    Musculoskeletal: As per HPI  Skin: Negative.    Neurological: As per HPI  Endo/Heme/Allergies: Negative.    Psychiatric/Behavioral: Negative.      All other systems reviewed and are negative. Pertinent positives and negatives noted in the HPI.        PHYSICAL EXAM:   Ht  63\"   Wt 168 lb (76.2 kg)   LMP  (LMP Unknown)   BMI 29.76 kg/m²     Body mass index is 29.76 kg/m².      General: No immediate distress  Head: Normocephalic/ Atraumatic  Eyes: Extra-occular movements intact.   Ears: No auricular hematoma or deformities  Mouth: No lesions or ulcerations  Heart: peripheral pulses intact. Normal capillary refill.   Lungs: Non-labored respirations  Abdomen: No abdominal guarding  Extremities: No lower extremity edema bilaterally   Skin: No lesions noted.   Cognition: alert & oriented x 3, attentive, able to follow 2 step commands, comprehention intact, spontaneous speech intact  Motor:    Musculoskeletal:    WRIST/HAND:  Inspection: no erythema, swelling, or obvious deformity  Palpation: Tender to palpation over the right TFCC and ulnar side of her wrist  ROM: intact to all planes of motion pain during radial and ulnar deviation        Data  Office Visit on 03/04/2024   Component Date Value Ref Range Status    SARS-CoV-2 (COVID-19)- ([facility]) 03/04/2024 Not Detected  Not Detected Final    Influenza A by PCR 03/04/2024 Not Detected  Not Detected Final    Influenza B by PCR 03/04/2024 Not Detected  Not Detected Final    RSV by PCR 03/04/2024 Not Detected  Not Detected Final   H Lab Encounter on 10/26/2023   Component Date Value Ref Range Status    HgbA1C 10/26/2023 6.4 (H)  <5.7 % Final    Estimated Average Glucose 10/26/2023 137 (H)  68 - 126 mg/dL Final   ]      Radiology Imaging:  I reviewed with the patient her X-ray of the wrist right from 1/18/2024  XR WRIST COMPLETE (MIN 3 VIEWS), RIGHT (CPT=73110)  Narrative: PROCEDURE: XR WRIST COMPLETE (MIN 3 VIEWS), RIGHT (CPT=73110)     COMPARISON: [facility], XR WRIST COMPLETE (MIN 3 VIEWS), RIGHT (CPT=73110), 11/04/2021, 11:13 AM.     INDICATIONS: Right wrist pain post fall 2-3 weeks ago.     TECHNIQUE: 3 views were obtained.       FINDINGS:   BONES: There is positive ulnar variance with subchondral cystic  change seen within the ulnar aspect of the lunate which can be seen with ulnar impaction syndrome.  There is positive ulnar variance by 2 mm. The joint spaces are otherwise maintained. There   is no acute fracture or dislocation.    SOFT TISSUES: Negative. No visible soft tissue swelling.   EFFUSION: None visible.   OTHER: Negative.               Impression: CONCLUSION:      Imaging findings suggestive of ulnar impaction syndrome.      No acute fracture or dislocation within the wrist.             Dictated by (CST): Michael Painter MD on 1/18/2024 at 8:20 PM       Finalized by (CST): Michael Painter MD on 1/18/2024 at 8:22 PM              ASSESSMENT AND PLAN:  Paula is a pleasant 59-year-old female who presents with right wrist pain mostly in the ulnar side.  I believe she has a TFCC sprain.  I have recommended a right TFCC corticosteroid injection under ultrasound guidance.  If that is unhelpful, then I would recommend an MR arthrogram of the right wrist.  I would like for her to hold off from physical therapy as she has completed about 2 months of treatment and has been doing well especially for her carpal tunnel syndrome.  She will follow-up with me for the TFCC injection       RTC in 1 month for right TFCC injection  Discharge Instructions were provided as documented in AVS summary.  The patient was in agreement with the assessment and plan.  All questions were answered.  There were no barriers to learning.         1. Injury of triangular fibrocartilage complex (TFCC) of right wrist, initial encounter    2. Ulnar impaction syndrome, right        Alex B. Behar MD  Physical Medicine and Rehabilitation/Sports Medicine  [facility]

## 2024-03-14 NOTE — PATIENT INSTRUCTIONS
My office will call you to schedule the RIGHT TFCC CSI under ultrasound guidance once the procedure is approved by your insurance carrier.       Hold off on Occupational therapy until we perform the injection    If TFCC injection is not helpful, then would recommend MRA right wrist

## 2024-03-18 ENCOUNTER — TELEPHONE (OUTPATIENT)
Dept: PHYSICAL MEDICINE AND REHAB | Facility: CLINIC | Age: 59
End: 2024-03-18

## 2024-03-18 NOTE — TELEPHONE ENCOUNTER
Initiated Right triangular fibrocartilage complex corticosteroid injection under ultrasound guidance CPT Code: 31835.  with site Availity.   Status: No authorization is required.

## 2024-03-19 NOTE — TELEPHONE ENCOUNTER
Patient has been scheduled for: 04/08/2024 at 9:20AM for injection. No questions or concerns at this time, nothing further needed.

## 2024-04-08 ENCOUNTER — OFFICE VISIT (OUTPATIENT)
Dept: PHYSICAL MEDICINE AND REHAB | Facility: CLINIC | Age: 59
End: 2024-04-08
Payer: COMMERCIAL

## 2024-04-08 DIAGNOSIS — M25.831 ULNAR IMPACTION SYNDROME, RIGHT: ICD-10-CM

## 2024-04-08 DIAGNOSIS — S69.81XA INJURY OF TRIANGULAR FIBROCARTILAGE COMPLEX (TFCC) OF RIGHT WRIST, INITIAL ENCOUNTER: Primary | ICD-10-CM

## 2024-04-08 RX ORDER — LIDOCAINE HYDROCHLORIDE 10 MG/ML
5 INJECTION, SOLUTION INFILTRATION; PERINEURAL ONCE
Status: SHIPPED | OUTPATIENT
Start: 2024-04-08

## 2024-04-08 RX ORDER — TRIAMCINOLONE ACETONIDE 40 MG/ML
40 INJECTION, SUSPENSION INTRA-ARTICULAR; INTRAMUSCULAR ONCE
Status: SHIPPED | OUTPATIENT
Start: 2024-04-08

## 2024-04-08 NOTE — PATIENT INSTRUCTIONS
Post Injection Instructions     Please do not do anything strenuous over the next two days (if you had a knee injection do not walk more than 2 city blocks, do not attend any aerobic classes, do not run, no heavy lifting, no prolong standing).  You may resume your day to day activities after your injection.  You may experience some mild amount of swelling after the procedure.  Please ice your joint that was injected at least 5-6 times a day (15 minutes) for two days after (this will help prevent worsening pain that sometimes occurs after an injection).  Only take tylenol if needed for pain for the first few days.  Watch for signs of infection which include redness, warmth, worsening pain, fevers or chills.  If you develop any of these signs call the office immediately at [phone]    Everyone responds differently to injections, but you can expect your peak effects a few weeks after your last injection.  Alex B. Behar MD  Physical Medicine and Rehabilitation/Sports Medicine  [facility]

## 2024-04-08 NOTE — PROCEDURES
[facility]  Wrist Injection Procedure Note    CHIEF COMPLAINT:  No chief complaint on file.      PROCEDURE PERFORMED:  Right TFCC corticosteroid injection under ultrasound guidance    INDICATIONS:  Right wrist pain     PRIMARY PROCEDURALIST:  Alex Behar, MD    INFORMED CONSENT & TIME OUT:   As documented in the Time Out and Pre-Procedure Check Lists.  Verbal consent was obtained    Vitals: [unfilled]  Labs (document last wbc, plts, hgb, and PT/INR):    Office Visit on 03/04/2024   Component Date Value Ref Range Status    SARS-CoV-2 (COVID-19)- ([facility]) 03/04/2024 Not Detected  Not Detected Final    Influenza A by PCR 03/04/2024 Not Detected  Not Detected Final    Influenza B by PCR 03/04/2024 Not Detected  Not Detected Final    RSV by PCR 03/04/2024 Not Detected  Not Detected Final   [facility] Lab Encounter on 10/26/2023   Component Date Value Ref Range Status    HgbA1C 10/26/2023 6.4 (H)  <5.7 % Final    Estimated Average Glucose 10/26/2023 137 (H)  68 - 126 mg/dL Final   ]    The RIGHT wrist was prepped and draped in the standard sterile fashion using 3 sticks of Betadine. Using a linear high frequency probe, the Right TFCC was identified in the dorsal wrist in the transverse plane.  The ulnar artery and veins were visualized. A 27-gauge 1.5 inch needle was inserted in the inplane technique.  Once the needle was seen abutting the Right joint, a mixture of2  cc 1% lidocaine and 1 cc of kenalog containing 40 mg of corticosteroid was visualized being injected in the joint.  The needle was then removed, hemostasis was obtained, Band-Aid was applied.  Patient tolerated the procedure well.  The patient was able to get up and ambulate.  The had full feeling in the Right hand.  Permanent pictures were obtained      INSTRUCTIONS GIVEN TO PATIENT:    \"You will see an effect in the next 2-3 days.  Please contact me if you have fevers, worsening swelling, worsening pain, decreased  range of motion, increased redness, chills, or anything that makes you concerned about how the joint we injected feels/looks.  If you do not reach me in a reasonable time, please report directly to the emergency room for further evaluation\"        Alex B. Behar MD, [facility]  Physical Medicine and Rehabilitation/Sports Medicine  [facility]

## 2024-04-23 ENCOUNTER — OFFICE VISIT (OUTPATIENT)
Dept: INTERNAL MEDICINE CLINIC | Facility: CLINIC | Age: 59
End: 2024-04-23
Payer: COMMERCIAL

## 2024-04-23 ENCOUNTER — NURSE TRIAGE (OUTPATIENT)
Dept: INTERNAL MEDICINE CLINIC | Facility: CLINIC | Age: 59
End: 2024-04-23

## 2024-04-23 VITALS
BODY MASS INDEX: 28.88 KG/M2 | HEIGHT: 63 IN | OXYGEN SATURATION: 100 % | TEMPERATURE: 72 F | SYSTOLIC BLOOD PRESSURE: 97 MMHG | DIASTOLIC BLOOD PRESSURE: 66 MMHG | WEIGHT: 163 LBS

## 2024-04-23 DIAGNOSIS — J01.00 ACUTE NON-RECURRENT MAXILLARY SINUSITIS: Primary | ICD-10-CM

## 2024-04-23 DIAGNOSIS — R19.7 DIARRHEA, UNSPECIFIED TYPE: ICD-10-CM

## 2024-04-23 PROCEDURE — 3074F SYST BP LT 130 MM HG: CPT | Performed by: INTERNAL MEDICINE

## 2024-04-23 PROCEDURE — 99214 OFFICE O/P EST MOD 30 MIN: CPT | Performed by: INTERNAL MEDICINE

## 2024-04-23 PROCEDURE — 3078F DIAST BP <80 MM HG: CPT | Performed by: INTERNAL MEDICINE

## 2024-04-23 PROCEDURE — 3008F BODY MASS INDEX DOCD: CPT | Performed by: INTERNAL MEDICINE

## 2024-04-23 RX ORDER — AZITHROMYCIN 250 MG/1
TABLET, FILM COATED ORAL
Qty: 6 TABLET | Refills: 0 | Status: SHIPPED | OUTPATIENT
Start: 2024-04-23 | End: 2024-04-23

## 2024-04-23 RX ORDER — AZITHROMYCIN 250 MG/1
TABLET, FILM COATED ORAL
Qty: 6 TABLET | Refills: 0 | Status: SHIPPED | OUTPATIENT
Start: 2024-04-23 | End: 2024-04-28

## 2024-04-23 NOTE — PROGRESS NOTES
Outpatient Office Note    HPI:     Paula Gordon is a 59 year old female.  Chief Complaint   Patient presents with    Cough    Sinusitis    Diarrhea    Vomiting       2 days ago came back from [place]    7 day(s) ago started c/o sore throat, congestion  Fever - No, no chills  Cough - Yes, productive - Yes, color - yellow, green now  Sick contacts - No  Travel - No  Other symptoms - no SOB, no chest pain  Has sinus pressure    Delayed Ozempic dose 4 days (took it Friday instead of usual Monday)  On the day she took Ozempic started having diarrhea and vomiting  Usually she gets an episode of diarrhea on the day she takes Ozempic but it resolves.  This time it has persisted  Has not eaten for the past 4 days because whenever she does, she gets diarrhea  No vomiting  No nausea        Wt Readings from Last 6 Encounters:   04/23/24 163 lb (73.9 kg)   03/14/24 168 lb (76.2 kg)   03/04/24 167 lb (75.8 kg)   02/01/24 171 lb 6.4 oz (77.7 kg)   01/22/24 177 lb (80.3 kg)   11/02/23 177 lb (80.3 kg)           Current Outpatient Medications   Medication Sig Dispense Refill    azithromycin 250 MG Oral Tab Take 2 tablets (500 mg total) by mouth daily for 1 day, THEN 1 tablet (250 mg total) daily for 4 days. 6 tablet 0    pantoprazole 40 MG Oral Tab EC Take 1 tablet (40 mg total) by mouth every morning. 90 tablet 2    semaglutide (OZEMPIC, 1 MG/DOSE,) 4 MG/3ML Subcutaneous Solution Pen-injector Inject 1 mg into the skin once a week. 10 mL 1    losartan 25 MG Oral Tab Take 1 tablet (25 mg total) by mouth daily. 90 tablet 3    atorvastatin 10 MG Oral Tab Take 1 tablet (10 mg total) by mouth daily. 90 tablet 3    sertraline 50 MG Oral Tab Take 1 tablet (50 mg total) by mouth every morning. 90 tablet 3    ONETOUCH ULTRA In Vitro Strip daily.      loratadine 10 MG Oral Tab       multivitamin Oral Tab Take 1 tablet by mouth daily.      albuterol 108 (90 Base) MCG/ACT Inhalation Aero Soln Inhale 2 puffs into the lungs every 6  (six) hours as needed for Wheezing or Shortness of Breath. 1 each 1    benzonatate 100 MG Oral Cap Take 1 capsule (100 mg total) by mouth 3 (three) times daily as needed for cough. 30 capsule 0      Past Medical History:    Diabetes (HCC)    Esophageal reflux    High blood pressure    High cholesterol      Past Surgical History:   Procedure Laterality Date    Adenoidectomy      Colonoscopy N/A 10/31/2017    Procedure: COLONOSCOPY;  Surgeon: Jamal Bower MD;  Location: [facility] ENDOSCOPY    Colonoscopy N/A 12/28/2019    Procedure: COLONOSCOPY;  Surgeon: Jamal Bower MD;  Location: [facility] ENDOSCOPY    D & c      Ghada biopsy stereo nodule 1 site right (cpt=19081)      samira 2013    Other surgical history      per NG: excision lession, local    Tonsillectomy        Social History:  Social History     Socioeconomic History    Marital status:    Tobacco Use    Smoking status: Former    Smokeless tobacco: Never    Tobacco comments:     Smoked socially, quit 35+ years ago   Vaping Use    Vaping status: Never Used   Substance and Sexual Activity    Alcohol use: Yes     Comment: Social drinker - 5-10/month    Drug use: Never    Sexual activity: Yes   Other Topics Concern    Caffeine Concern Yes     Comment: Daily    Exercise Yes     Comment: walking   Social History Narrative    The patient does not use an assistive device..      The patient does live in a home with stairs.      Family History   Problem Relation Age of Onset    Cancer Father         bladder    Hypertension Father     Gastro-Intestinal Disorder Other         family h/o diverticular disease    Dementia Mother         Mother is 93/yo with noticeable signs of Dementia    Cancer Maternal Grandmother         stomach cancer    Diabetes Paternal Grandmother     Gastro-Intestinal Disorder Brother     Other (Other) Brother       Not on File     REVIEW OF SYSTEMS:   Review of Systems   Review of Systems   Constitutional: Negative for activity change,  appetite change and fever.   HENT: Negative for congestion and voice change.    Respiratory: Negative for cough and shortness of breath.    Cardiovascular: Negative for chest pain.   Gastrointestinal: Negative for abdominal distention, abdominal pain and vomiting.   Genitourinary: Negative for hematuria.   Skin: Negative for wound.   Psychiatric/Behavioral: Negative for behavioral problems.   Wt Readings from Last 5 Encounters:   04/23/24 163 lb (73.9 kg)   03/14/24 168 lb (76.2 kg)   03/04/24 167 lb (75.8 kg)   02/01/24 171 lb 6.4 oz (77.7 kg)   01/22/24 177 lb (80.3 kg)     Body mass index is 28.87 kg/m².      EXAM:   BP 97/66   Temp (!) 72 °F (22.2 °C)   Ht 5' 3\" (1.6 m)   Wt 163 lb (73.9 kg)   LMP  (LMP Unknown)   SpO2 100%   BMI 28.87 kg/m²   Physical Exam   Constitutional:       Appearance: Normal appearance.   HENT:      Head: Normocephalic.   Eyes:      Conjunctiva/sclera: Conjunctivae normal.   Cardiovascular:      Rate and Rhythm: Normal rate and regular rhythm.      Heart sounds: Normal heart sounds. No murmur heard.  Pulmonary:      Effort: Pulmonary effort is normal.      Breath sounds: Normal breath sounds. No rhonchi or rales.   Abdominal:      General: Bowel sounds are normal.      Palpations: Abdomen is soft.      Tenderness: There is no abdominal tenderness.   Musculoskeletal:      Cervical back: Neck supple.      Right lower leg: No edema.      Left lower leg: No edema.   Skin:     General: Skin is warm and dry.   Neurological:      General: No focal deficit present.      Mental Status: He is alert and oriented to person, place, and time. Mental status is at baseline.   Psychiatric:         Mood and Affect: Mood normal.         Behavior: Behavior normal.       Health Maintenence:     Health Maintenance Topics with due status: Overdue       Topic Date Due    COVID-19 Vaccine Refer to guidelines    Diabetes Care: GFR 03/30/2024    Diabetes Care: Microalb/Creat Ratio 03/30/2024    Diabetes Care  Foot Exam 04/06/2024     Health Maintenance Topics with due status: Due Soon       Topic Date Due    Diabetes Care A1C 04/26/2024    Diabetes Care Dilated Eye Exam 05/23/2024            ASSESSMENT AND PLAN:   1. Acute non-recurrent maxillary sinusitis  - azithromycin 250 MG Oral Tab; Take 2 tablets (500 mg total) by mouth daily for 1 day, THEN 1 tablet (250 mg total) daily for 4 days.  Dispense: 6 tablet; Refill: 0    2. Diarrhea, unspecified type  -Could be related to Ozempic but could also be acute gastroenteritis  -Counseled the patient on the importance of good hydration.  Suggested Pedialyte and Gatorade to help her maintain good electrolyte balance  -Probiotics  -If your symptoms persist patient was instructed to let us know so we can either go down on her Ozempic dose or delay it        The patient indicates understanding of these issues and agrees to the plan.  No follow-ups on file.        This note was prepared using Dragon Medical voice recognition dictation software. As a result errors may occur. When identified these errors have been corrected. While every attempt is made to correct errors during dictation discrepancies may still exist.    Dillon Noe MD

## 2024-04-23 NOTE — TELEPHONE ENCOUNTER
Action Requested: Summary for Provider     []  Critical Lab, Recommendations Needed  [] Need Additional Advice  []   FYI    []   Need Orders  [] Need Medications Sent to Pharmacy  []  Other     SUMMARY: Pt returned from vacation with nasal congestion and right side of sinus pressure/headache. Nasal drainage yellow to green. Intermittent cough present. Took tylenol for the sinus pressure and headache which helped for short time. No fever, bodyaches, chills, shortness of breath. Also, missed ozempic dose on Monday and took medication on Friday. Then developed diarrhea which she usually gets after administration, but this time diarrhea was a lot. Doesn't know if this is a normal reaction.     Scheduled appt with Dr Noe today at 11:40 am for further evaluation and treatment. Advised pt to wear a mask to appt. Pt to continue home remedy until appt time today and agreed with plan.    Please reply to pool: EM RN TRIAGE      Reason for call: Sinusitis  Onset: Data Unavailable      General Assessment (questions to ask the caller)  Do you consider this a medical emergency?: No  Can you access 911?: Yes  Do you have any pain?: No (Sinus pressure/headache)  Do you have a fever?: No  What is the date of your last medical exam?: 03/04/24 (Pt seen Dr Anne in office.)  Additional Assessments  Associated Symptoms: cough (Right side sinus pressure and congestion with yellow to green  secretion one week.)  Are these symptoms new, recurrent, or chronic?: new  Precipitated by: no precipitating factors  Aggravated by: no aggravating factors  Alleviated by: acetaminophen           Reason for Disposition   Sinus congestion (pressure, fullness) present > 10 days    Protocols used: Sinus Pain and Congestion-A-[place]

## 2024-04-30 ENCOUNTER — LAB ENCOUNTER (OUTPATIENT)
Dept: LAB | Age: 59
End: 2024-04-30
Attending: INTERNAL MEDICINE
Payer: COMMERCIAL

## 2024-04-30 DIAGNOSIS — N18.30 CONTROLLED TYPE 2 DIABETES MELLITUS WITH STAGE 3 CHRONIC KIDNEY DISEASE, WITHOUT LONG-TERM CURRENT USE OF INSULIN (HCC): ICD-10-CM

## 2024-04-30 DIAGNOSIS — E11.22 CONTROLLED TYPE 2 DIABETES MELLITUS WITH STAGE 3 CHRONIC KIDNEY DISEASE, WITHOUT LONG-TERM CURRENT USE OF INSULIN (HCC): ICD-10-CM

## 2024-04-30 LAB
ANION GAP SERPL CALC-SCNC: 5 MMOL/L (ref 0–18)
BUN BLD-MCNC: 10 MG/DL (ref 9–23)
BUN/CREAT SERPL: 9.5 (ref 10–20)
CALCIUM BLD-MCNC: 9.6 MG/DL (ref 8.7–10.4)
CHLORIDE SERPL-SCNC: 110 MMOL/L (ref 98–112)
CO2 SERPL-SCNC: 30 MMOL/L (ref 21–32)
CREAT BLD-MCNC: 1.05 MG/DL
CREAT UR-SCNC: 232.4 MG/DL
EGFRCR SERPLBLD CKD-EPI 2021: 60 ML/MIN/1.73M2 (ref 60–?)
EST. AVERAGE GLUCOSE BLD GHB EST-MCNC: 137 MG/DL (ref 68–126)
FASTING STATUS PATIENT QL REPORTED: YES
GLUCOSE BLD-MCNC: 118 MG/DL (ref 70–99)
HBA1C MFR BLD: 6.4 % (ref ?–5.7)
MICROALBUMIN UR-MCNC: 0.5 MG/DL
MICROALBUMIN/CREAT 24H UR-RTO: 2.2 UG/MG (ref ?–30)
OSMOLALITY SERPL CALC.SUM OF ELEC: 300 MOSM/KG (ref 275–295)
POTASSIUM SERPL-SCNC: 4.7 MMOL/L (ref 3.5–5.1)
SODIUM SERPL-SCNC: 145 MMOL/L (ref 136–145)

## 2024-04-30 PROCEDURE — 36415 COLL VENOUS BLD VENIPUNCTURE: CPT

## 2024-04-30 PROCEDURE — 82043 UR ALBUMIN QUANTITATIVE: CPT

## 2024-04-30 PROCEDURE — 82570 ASSAY OF URINE CREATININE: CPT

## 2024-04-30 PROCEDURE — 83036 HEMOGLOBIN GLYCOSYLATED A1C: CPT

## 2024-04-30 PROCEDURE — 80048 BASIC METABOLIC PNL TOTAL CA: CPT

## 2024-05-02 ENCOUNTER — OFFICE VISIT (OUTPATIENT)
Dept: INTERNAL MEDICINE CLINIC | Facility: CLINIC | Age: 59
End: 2024-05-02
Payer: COMMERCIAL

## 2024-05-02 VITALS
HEIGHT: 63 IN | WEIGHT: 166.38 LBS | DIASTOLIC BLOOD PRESSURE: 75 MMHG | HEART RATE: 71 BPM | BODY MASS INDEX: 29.48 KG/M2 | SYSTOLIC BLOOD PRESSURE: 116 MMHG | OXYGEN SATURATION: 99 %

## 2024-05-02 DIAGNOSIS — E78.00 HYPERCHOLESTEROLEMIA: ICD-10-CM

## 2024-05-02 DIAGNOSIS — E11.22 CONTROLLED TYPE 2 DIABETES MELLITUS WITH STAGE 3 CHRONIC KIDNEY DISEASE, WITHOUT LONG-TERM CURRENT USE OF INSULIN (HCC): Primary | ICD-10-CM

## 2024-05-02 DIAGNOSIS — E66.9 OBESITY (BMI 30-39.9): ICD-10-CM

## 2024-05-02 DIAGNOSIS — N18.30 CONTROLLED TYPE 2 DIABETES MELLITUS WITH STAGE 3 CHRONIC KIDNEY DISEASE, WITHOUT LONG-TERM CURRENT USE OF INSULIN (HCC): Primary | ICD-10-CM

## 2024-05-02 DIAGNOSIS — F41.9 ANXIETY: ICD-10-CM

## 2024-05-02 DIAGNOSIS — K21.9 GASTROESOPHAGEAL REFLUX DISEASE, UNSPECIFIED WHETHER ESOPHAGITIS PRESENT: ICD-10-CM

## 2024-05-02 PROCEDURE — 3078F DIAST BP <80 MM HG: CPT | Performed by: INTERNAL MEDICINE

## 2024-05-02 PROCEDURE — 99214 OFFICE O/P EST MOD 30 MIN: CPT | Performed by: INTERNAL MEDICINE

## 2024-05-02 PROCEDURE — 3074F SYST BP LT 130 MM HG: CPT | Performed by: INTERNAL MEDICINE

## 2024-05-02 PROCEDURE — 3008F BODY MASS INDEX DOCD: CPT | Performed by: INTERNAL MEDICINE

## 2024-05-02 PROCEDURE — 3044F HG A1C LEVEL LT 7.0%: CPT | Performed by: INTERNAL MEDICINE

## 2024-05-02 PROCEDURE — 3061F NEG MICROALBUMINURIA REV: CPT | Performed by: INTERNAL MEDICINE

## 2024-05-02 NOTE — PROGRESS NOTES
Paula Gordon is a 59 year old female who is here for  1. Controlled type 2 diabetes mellitus with stage 3 chronic kidney disease, without long-term current use of insulin ([facility])    2. Hypercholesterolemia    3. Obesity (BMI 30-39.9)    4. Gastroesophageal reflux disease, unspecified whether esophagitis present    5. Anxiety        HPI:   Paula Gordon is a 59 year old lady presents to the office to establish care.   She was a patient of Dr. Patel, who now is retired.   She denies any complaints today.  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    Past Medical History:    Diabetes (HCC)    Esophageal reflux    High blood pressure    High cholesterol     Past Surgical History:   Procedure Laterality Date    Adenoidectomy      Colonoscopy N/A 10/31/2017    Procedure: COLONOSCOPY;  Surgeon: Jamal Bower MD;  Location: [facility] ENDOSCOPY    Colonoscopy N/A 12/28/2019    Procedure: COLONOSCOPY;  Surgeon: aJmal Bower MD;  Location: [facility] ENDOSCOPY    D & c      Ghada biopsy stereo nodule 1 site right (cpt=19081)      [facility] 2013    Other surgical history      per NG: excision lession, local    Tonsillectomy         Current Outpatient Medications:     empagliflozin (JARDIANCE) 10 MG Oral Tab, Take 1 tablet (10 mg total) by mouth daily., Disp: 90 tablet, Rfl: 0    pantoprazole 40 MG Oral Tab EC, Take 1 tablet (40 mg total) by mouth every morning., Disp: 90 tablet, Rfl: 2    semaglutide (OZEMPIC, 1 MG/DOSE,) 4 MG/3ML Subcutaneous Solution Pen-injector, Inject 1 mg into the skin once a week., Disp: 10 mL, Rfl: 1    losartan 25 MG Oral Tab, Take 1 tablet (25 mg total) by mouth daily., Disp: 90 tablet, Rfl: 3    atorvastatin 10 MG Oral Tab, Take 1 tablet (10 mg total) by mouth daily., Disp: 90 tablet, Rfl: 3    sertraline 50 MG Oral Tab, Take 1 tablet (50 mg total) by mouth every morning., Disp: 90 tablet, Rfl: 3    ONETOUCH ULTRA In Vitro Strip,  daily., Disp: , Rfl:     loratadine 10 MG Oral Tab, , Disp: , Rfl:     multivitamin Oral Tab, Take 1 tablet by mouth daily., Disp: , Rfl:     Allergies:No Known Allergies  Social History     Socioeconomic History    Marital status:      Spouse name: Not on file    Number of children: Not on file    Years of education: Not on file    Highest education level: Not on file   Occupational History    Not on file   Tobacco Use    Smoking status: Former    Smokeless tobacco: Never    Tobacco comments:     Smoked socially, quit 35+ years ago   Vaping Use    Vaping status: Never Used   Substance and Sexual Activity    Alcohol use: Yes     Comment: Social drinker - 5-10/month    Drug use: Never    Sexual activity: Yes   Other Topics Concern     Service Not Asked    Blood Transfusions Not Asked    Caffeine Concern Yes     Comment: Daily    Occupational Exposure Not Asked    Hobby Hazards Not Asked    Sleep Concern Not Asked    Stress Concern Not Asked    Weight Concern Not Asked    Special Diet Not Asked    Back Care Not Asked    Exercise Yes     Comment: walking    Bike Helmet Not Asked    Seat Belt Not Asked    Self-Exams Not Asked   Social History Narrative    The patient does not use an assistive device..      The patient does live in a home with stairs.     Social Determinants of Health     Financial Resource Strain: Not on file   Food Insecurity: Not on file   Transportation Needs: Not on file   Physical Activity: Not on file   Stress: Not on file   Social Connections: Not on file   Housing Stability: Not on file       REVIEW OF SYSTEMS:     GENERAL HEALTH: No fevers, chills, sweats, fatigue  VISION: No recent vision problems, blurry vision or double vision  HEENT: No decreased hearing ear pain nasal congestion or sore throat  SKIN: denies any unusual skin lesions or rashes  RESPIRATORY: denies shortness of breath, cough, wheezing  CARDIOVASCULAR: denies chest pain on exertion, palpitations, swelling in  feet  GI: denies abdominal pain and denies heartburn, nausea or vomiting  : No Pain on urination, change in the color of urine, discharge, urinating frequently  MUS: No back pain, joint pain, muscle pain  NEURO: denies headaches , anxiety, depression    EXAM:     Vitals:    05/02/24 1331   BP: 116/75   Pulse: 71   SpO2: 99%   Weight: 166 lb 6.4 oz (75.5 kg)   Height: 5' 3\" (1.6 m)     GENERAL: well developed, well nourished,in no apparent distress  SKIN: no rashes,no suspicious lesions  HEENT: atraumatic, normocephalic,ears and throat are clear,   NECK: supple,no adenopathy,  LUNGS: clear to auscultation, no wheeze  CARDIO: RRR without murmur  GI: good BS's,no masses or tenderness  EXTREMITIES: no cyanosis, or edema    Bilateral barefoot skin diabetic exam is normal, visualized feet and the appearance is normal.  Bilateral monofilament/sensation of both feet is normal.  Pulsation pedal pulse exam of both lower legs/feet is normal as well.        ASSESSMENT AND PLAN:   1. Controlled type 2 diabetes mellitus with stage 3 chronic kidney disease, without long-term current use of insulin (HCC)  -was on metformin, discontinued when ozempic was titrated up  -A1c 6.4 - 4/2024  Plan:  -currently on Ozempic 1 mg  -start Jardiance 10 mg daily    2. Carrasco's esophagus without dysplasia  -EGD 5/2023 with Dr. Westbrook 1.  Irregular squamocolumnar junction/short segment Carrasco's esophagus 2.  Small hiatal hernia  3.  Large amount of retained food material within the stomach.  If the patient has properly fasted preceding the procedure this suggests delayed gastric emptying/gastroparesis perhaps augmented by the patient's GLP-1 agonist therapy.  Plan:  -stable continue omeprazole    3. Caregiver stress  4. 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 Hypercholesterolemia  -on atv 10 mg    6. Obesity, Class I, BMI 30-34.9  -BMI 30.36 -> 29.48  -Ozempic is helping her lose weight  -lost about 25 lbs since she started      The patient indicates understanding of these issues and agrees to the plan.        Return in about 3 months (around 8/2/2024).  Recheck A1c after starting jardiance today    Stephanie Anne MD

## 2024-05-09 DIAGNOSIS — E78.00 HYPERCHOLESTEROLEMIA: ICD-10-CM

## 2024-05-09 DIAGNOSIS — E11.22 CONTROLLED TYPE 2 DIABETES MELLITUS WITH STAGE 3 CHRONIC KIDNEY DISEASE, WITHOUT LONG-TERM CURRENT USE OF INSULIN (HCC): ICD-10-CM

## 2024-05-09 DIAGNOSIS — N18.30 CONTROLLED TYPE 2 DIABETES MELLITUS WITH STAGE 3 CHRONIC KIDNEY DISEASE, WITHOUT LONG-TERM CURRENT USE OF INSULIN (HCC): ICD-10-CM

## 2024-05-09 NOTE — TELEPHONE ENCOUNTER
Patient is calling for refill on Ozempic 1 mg. She currently  has a week left. She was seen in the office on 5/2/24, see note from visit:     Controlled type 2 diabetes mellitus with stage 3 chronic kidney disease, without long-term current use of insulin (HCC)  -was on metformin, discontinued when ozempic was titrated up  -A1c 6.4 - 4/2024  Plan:  -currently on Ozempic 1 mg  -start Jardiance 10 mg daily    Pended the order for Ozempic  Patient also asking for refill on Atorvastatin last prescribed by Dr. Patel. Patient has no recent lipid panel..    Requested Prescriptions     Pending Prescriptions Disp Refills    semaglutide (OZEMPIC, 1 MG/DOSE,) 4 MG/3ML Subcutaneous Solution Pen-injector 10 mL 1     Sig: Inject 1 mg into the skin once a week.     Please review; protocol failed. .    Cholesterol Medications  Protocol Criteria:  Appointment scheduled in the past 12 months or in the next 3 months  ALT & LDL on file in the past 12 months  ALT result < 80  LDL result <130   Recent Outpatient Visits              1 week ago Controlled type 2 diabetes mellitus with stage 3 chronic kidney disease, without long-term current use of insulin (HCC)    [facility]    [facility]    2 weeks ago Acute non-recurrent maxillary sinusitis    [facility]    [facility] Visit    1 month ago Injury of triangular fibrocartilage complex (TFCC) of right wrist, initial encounter    [facility] [place] Behar, Alex, MD    Office Visit    1 month ago Injury of triangular fibrocartilage complex (TFCC) of right wrist, initial encounter    [facility] [place] Behar, Alex, MD    Office Visit    2 months ago Acute bronchitis, unspecified organism    [facility]    [facility]          Future  Appointments         Provider Department Appt Notes    In 3 weeks Behar, Alex, MD [facility] F/U    In 3 months Stephanie Anne MD [facility] Check A1C          Lab Results   Component Value Date    LDL 76 03/30/2023     Lab Results   Component Value Date    ALT 21 03/30/2023

## 2024-05-10 RX ORDER — SEMAGLUTIDE 1.34 MG/ML
1 INJECTION, SOLUTION SUBCUTANEOUS WEEKLY
Qty: 10 ML | Refills: 1 | Status: SHIPPED | OUTPATIENT
Start: 2024-05-10

## 2024-05-10 RX ORDER — ATORVASTATIN CALCIUM 10 MG/1
10 TABLET, FILM COATED ORAL DAILY
Qty: 90 TABLET | Refills: 3 | Status: SHIPPED | OUTPATIENT
Start: 2024-05-10

## 2024-07-13 DIAGNOSIS — N18.30 CONTROLLED TYPE 2 DIABETES MELLITUS WITH STAGE 3 CHRONIC KIDNEY DISEASE, WITHOUT LONG-TERM CURRENT USE OF INSULIN (HCC): ICD-10-CM

## 2024-07-13 DIAGNOSIS — E11.22 CONTROLLED TYPE 2 DIABETES MELLITUS WITH STAGE 3 CHRONIC KIDNEY DISEASE, WITHOUT LONG-TERM CURRENT USE OF INSULIN (HCC): ICD-10-CM

## 2024-07-17 NOTE — TELEPHONE ENCOUNTER
Refill passed per [facility] protocol.  Requested Prescriptions   Pending Prescriptions Disp Refills    JARDIANCE 10 MG Oral Tab [Pharmacy Med Name: JARDIANCE TAB 10MG] 90 tablet 0     Sig: TAKE 1 TABLET DAILY       Diabetes Medication Protocol Passed - 7/13/2024  6:22 PM        Passed - Last A1C < 7.5 and within past 6 months     Lab Results   Component Value Date    A1C 6.4 (H) 04/30/2024             Passed - In person appointment or virtual visit in the past 6 mos or appointment in next 3 mos     Recent Outpatient Visits              2 months ago Controlled type 2 diabetes mellitus with stage 3 chronic kidney disease, without long-term current use of insulin ([facility])    [facility] Stephanie Anne MD    Office Visit    2 months ago Acute non-recurrent maxillary sinusitis    [facility] Noe MD    Office Visit    3 months ago Injury of triangular fibrocartilage complex (TFCC) of right wrist, initial encounter    [facility]    Office Visit    4 months ago Injury of triangular fibrocartilage complex (TFCC) of right wrist, initial encounter    [facility]    Office Visit    4 months ago Acute bronchitis, unspecified organism    [facility] Stephanie Anne MD    Office Visit          Future Appointments         Provider Department Appt Notes    In 3 weeks Stephanie Anne MD [facility] Check A1C                    Passed - Microalbumin procedure in past 12 months or taking ACE/ARB        Passed - EGFRCR or GFRNAA > 50     GFR Evaluation  EGFRCR: 60 , resulted on 4/30/2024          Passed - GFR in the past 12 months           Recent Outpatient Visits              2 months ago Controlled type 2 diabetes mellitus  with stage 3 chronic kidney disease, without long-term current use of insulin (HCC)    [facility]    Office Visit    2 months ago Acute non-recurrent maxillary sinusitis    [facility] Noe MD    Office Visit    3 months ago Injury of triangular fibrocartilage complex (TFCC) of right wrist, initial encounter    [facility]    Office Visit    4 months ago Injury of triangular fibrocartilage complex (TFCC) of right wrist, initial encounter    [facility] Be[facility], Alex, MD    Office Visit    4 months ago Acute bronchitis, unspecified organism    [facility]    Office Visit          Future Appointments         Provider Department Appt Notes    In 3 weeks Stephanie Anne MD [facility] Check A1C

## 2024-08-07 ENCOUNTER — OFFICE VISIT (OUTPATIENT)
Dept: INTERNAL MEDICINE CLINIC | Facility: CLINIC | Age: 59
End: 2024-08-07
Payer: COMMERCIAL

## 2024-08-07 VITALS
HEIGHT: 63 IN | WEIGHT: 163 LBS | HEART RATE: 90 BPM | OXYGEN SATURATION: 96 % | DIASTOLIC BLOOD PRESSURE: 75 MMHG | BODY MASS INDEX: 28.88 KG/M2 | SYSTOLIC BLOOD PRESSURE: 109 MMHG

## 2024-08-07 DIAGNOSIS — N18.30 CONTROLLED TYPE 2 DIABETES MELLITUS WITH STAGE 3 CHRONIC KIDNEY DISEASE, WITHOUT LONG-TERM CURRENT USE OF INSULIN (HCC): Primary | ICD-10-CM

## 2024-08-07 DIAGNOSIS — R30.0 DYSURIA: ICD-10-CM

## 2024-08-07 DIAGNOSIS — Z12.31 BREAST CANCER SCREENING BY MAMMOGRAM: ICD-10-CM

## 2024-08-07 DIAGNOSIS — L65.9 HAIR LOSS: ICD-10-CM

## 2024-08-07 DIAGNOSIS — F41.9 ANXIETY: ICD-10-CM

## 2024-08-07 DIAGNOSIS — E11.22 CONTROLLED TYPE 2 DIABETES MELLITUS WITH STAGE 3 CHRONIC KIDNEY DISEASE, WITHOUT LONG-TERM CURRENT USE OF INSULIN (HCC): Primary | ICD-10-CM

## 2024-08-07 DIAGNOSIS — M79.672 LEFT FOOT PAIN: ICD-10-CM

## 2024-08-07 LAB
APPEARANCE: CLEAR
BILIRUBIN: NEGATIVE
HEMOGLOBIN A1C: 5.8 % (ref 4.3–5.6)
KETONES (URINE DIPSTICK): NEGATIVE MG/DL
LEUKOCYTES: NEGATIVE
MULTISTIX LOT#: ABNORMAL NUMERIC
NITRITE, URINE: NEGATIVE
OCCULT BLOOD: NEGATIVE
PH, URINE: 5 (ref 4.5–8)
PROTEIN (URINE DIPSTICK): NEGATIVE MG/DL
SPECIFIC GRAVITY: 1000 (ref 1–1.03)
URINE-COLOR: YELLOW
UROBILINOGEN,SEMI-QN: 0.2 MG/DL (ref 0–1.9)

## 2024-08-07 PROCEDURE — 3078F DIAST BP <80 MM HG: CPT | Performed by: INTERNAL MEDICINE

## 2024-08-07 PROCEDURE — 3008F BODY MASS INDEX DOCD: CPT | Performed by: INTERNAL MEDICINE

## 2024-08-07 PROCEDURE — 81002 URINALYSIS NONAUTO W/O SCOPE: CPT | Performed by: INTERNAL MEDICINE

## 2024-08-07 PROCEDURE — 83036 HEMOGLOBIN GLYCOSYLATED A1C: CPT | Performed by: INTERNAL MEDICINE

## 2024-08-07 PROCEDURE — 3074F SYST BP LT 130 MM HG: CPT | Performed by: INTERNAL MEDICINE

## 2024-08-07 PROCEDURE — 99214 OFFICE O/P EST MOD 30 MIN: CPT | Performed by: INTERNAL MEDICINE

## 2024-08-07 PROCEDURE — 3044F HG A1C LEVEL LT 7.0%: CPT | Performed by: INTERNAL MEDICINE

## 2024-08-07 RX ORDER — LOSARTAN POTASSIUM 25 MG/1
25 TABLET ORAL DAILY
Qty: 90 TABLET | Refills: 3 | Status: SHIPPED | OUTPATIENT
Start: 2024-08-07

## 2024-08-07 NOTE — PROGRESS NOTES
Paula Gordon is a 59 year old female who is here for  1. Controlled type 2 diabetes mellitus with stage 3 chronic kidney disease, without long-term current use of insulin (HCC)    2. Anxiety    3. Hair loss    4. Dysuria    5. Breast cancer screening by mammogram    6. Left foot pain        HPI:   Paula Gordon is a 59 year old lady presents to the office to establish care.   She was a patient of Dr. Patel, who now is retired.   She denies any complaints today.  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    Initial weight: 166 lbs 5/2024  Current weight: 163 lbs  Interval weight loss: 3 lbs  Total weight loss: 3 lbs      Wt Readings from Last 6 Encounters:   08/07/24 163 lb (73.9 kg)   05/02/24 166 lb 6.4 oz (75.5 kg)   04/23/24 163 lb (73.9 kg)   03/14/24 168 lb (76.2 kg)   03/04/24 167 lb (75.8 kg)   02/01/24 171 lb 6.4 oz (77.7 kg)         Past Medical History:    Diabetes (HCC)    Esophageal reflux    High blood pressure    High cholesterol     Past Surgical History:   Procedure Laterality Date    Adenoidectomy      Colonoscopy N/A 10/31/2017    Procedure: COLONOSCOPY;  Surgeon: Jamal Bower MD;  Location: [facility] ENDOSCOPY    Colonoscopy N/A 12/28/2019    Procedure: COLONOSCOPY;  Surgeon: Jamal Bower MD;  Location: [facility] ENDOSCOPY    D & c      Ghada biopsy stereo nodule 1 site right (cpt=19081)      [facility] 2013    Other surgical history      per NG: excision lession, local    Tonsillectomy         Current Outpatient Medications:     sertraline 50 MG Oral Tab, Take 1 tablet (50 mg total) by mouth every morning., Disp: 90 tablet, Rfl: 3    losartan 25 MG Oral Tab, Take 1 tablet (25 mg total) by mouth daily., Disp: 90 tablet, Rfl: 3    empagliflozin (JARDIANCE) 10 MG Oral Tab, Take 1 tablet (10 mg total) by mouth daily., Disp: 90 tablet, Rfl: 3    semaglutide (OZEMPIC, 1 MG/DOSE,) 4 MG/3ML Subcutaneous Solution Pen-injector, Inject  1 mg into the skin once a week., Disp: 10 mL, Rfl: 1    atorvastatin 10 MG Oral Tab, Take 1 tablet (10 mg total) by mouth daily., Disp: 90 tablet, Rfl: 3    pantoprazole 40 MG Oral Tab EC, Take 1 tablet (40 mg total) by mouth every morning., Disp: 90 tablet, Rfl: 2    ONETOUCH ULTRA In Vitro Strip, daily., Disp: , Rfl:     loratadine 10 MG Oral Tab, , Disp: , Rfl:     multivitamin Oral Tab, Take 1 tablet by mouth daily., Disp: , Rfl:     Allergies:No Known Allergies  Social History     Socioeconomic History    Marital status:      Spouse name: Not on file    Number of children: Not on file    Years of education: Not on file    Highest education level: Not on file   Occupational History    Not on file   Tobacco Use    Smoking status: Former    Smokeless tobacco: Never    Tobacco comments:     Smoked socially, quit 35+ years ago   Vaping Use    Vaping status: Never Used   Substance and Sexual Activity    Alcohol use: Yes     Comment: Social drinker - 5-10/month    Drug use: Never    Sexual activity: Yes   Other Topics Concern     Service Not Asked    Blood Transfusions Not Asked    Caffeine Concern Yes     Comment: Daily    Occupational Exposure Not Asked    Hobby Hazards Not Asked    Sleep Concern Not Asked    Stress Concern Not Asked    Weight Concern Not Asked    Special Diet Not Asked    Back Care Not Asked    Exercise Yes     Comment: walking    Bike Helmet Not Asked    Seat Belt Not Asked    Self-Exams Not Asked   Social History Narrative    The patient does not use an assistive device..      The patient does live in a home with stairs.     Social Determinants of Health     Financial Resource Strain: Not on file   Food Insecurity: Not on file   Transportation Needs: Not on file   Physical Activity: Not on file   Stress: Not on file   Social Connections: Not on file   Housing Stability: Not on file       REVIEW OF SYSTEMS:     GENERAL HEALTH: No fevers, chills, sweats, fatigue  VISION: No recent  vision problems, blurry vision or double vision  HEENT: No decreased hearing ear pain nasal congestion or sore throat  SKIN: denies any unusual skin lesions or rashes  RESPIRATORY: denies shortness of breath, cough, wheezing  CARDIOVASCULAR: denies chest pain on exertion, palpitations, swelling in feet  GI: denies abdominal pain and denies heartburn, nausea or vomiting  : No Pain on urination, change in the color of urine, discharge, urinating frequently  MUS: No back pain, joint pain, muscle pain  NEURO: denies headaches , anxiety, depression    EXAM:     Vitals:    08/07/24 1321   BP: 109/75   Pulse: 90   SpO2: 96%   Weight: 163 lb (73.9 kg)   Height: 5' 3\" (1.6 m)     GENERAL: well developed, well nourished,in no apparent distress  SKIN: no rashes,no suspicious lesions  HEENT: atraumatic, normocephalic,ears and throat are clear,   NECK: supple,no adenopathy,  LUNGS: clear to auscultation, no wheeze  CARDIO: RRR without murmur  GI: good BS's,no masses or tenderness  EXTREMITIES: no cyanosis, or edema    Bilateral barefoot skin diabetic exam is normal, visualized feet and the appearance is normal.  Bilateral monofilament/sensation of both feet is normal.  Pulsation pedal pulse exam of both lower legs/feet is normal as well.        ASSESSMENT AND PLAN:   1. Controlled type 2 diabetes mellitus with stage 3 chronic kidney disease, without long-term current use of insulin (HCC)  -was on metformin, discontinued when ozempic was titrated up  -A1c 6.4 - 4/2024 -> 5.8 5/2024  Plan:  -currently on Ozempic 1 mg  -Jardiance 10 mg daily    2. Carrasco's esophagus without dysplasia  -EGD 5/2023 with Dr. Westbrook 1.  Irregular squamocolumnar junction/short segment Carrasco's esophagus 2.  Small hiatal hernia  3.  Large amount of retained food material within the stomach.  If the patient has properly fasted preceding the procedure this suggests delayed gastric emptying/gastroparesis perhaps augmented by the patient's GLP-1  agonist therapy.  Plan:  -stable continue omeprazole    3. Caregiver stress  4. 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 Hypercholesterolemia  -on atv 10 mg    6. Obesity, Class I, BMI 30-34.9  -BMI 30.36 -> 29.48  -Ozempic is helping her lose weight  -lost about 25 lbs since she started    7. Hair loss  -recent increase in hair loss  Plan  - TSH W Reflex To Free T4 [E]; Future    8. Dysuria  - URINALYSIS NONAUTO W/O SCOPE negative    9. Breast cancer screening by mammogram  - [facility] FRANCESCO 2D+3D SCREENING BILAT (CPT=77067/77189); Future    10. Left foot pain  -used to get injection in her foot wants to see a specialist  Plan  - Podiatry Referral - In Network    The patient indicates understanding of these issues and agrees to the plan.      Return in about 3 months (around 11/7/2024).      Stephanie Anne MD

## 2024-08-07 NOTE — PATIENT INSTRUCTIONS
Please try to work on the following dietary changes:  Goals: Aim for 20-30 grams of protein/ meal  Aim for <100 grams of carbohydrates/day  Eat 4-6 vegetables/day  Avoid skipping meals- eat every 4-5 hours  Aim for 3 meals/day  2. Drink lots of water and cut down on soda/juice consumption if soda/juice drinker  3. Focus on protein: (15-30 grams with each meal) ie. greek yogurt, cottage cheese, string cheese, hard boiled eggs  4. Healthy snacks: always have protein in your snack! peanut butter and apples, hummus and carrots, berries, nuts (1/4 cup), tuna and crackers                 Protein Shakes: Premier protein or Core Power                Protein Bars: Rx Bars, Oatmega, Power Crunch                 S[place]ento balanced breaks (cheese and nuts)- without chocolate  5. Reduce carbohydrates <100 grams which includes sweets as well as rice, pasta, potatoes, bread, corn and instead choose whole grain options or more protein or vegetables (4-6 servings of vegetables per day). Use NUOFFER mary anne for carb counting!  6. Get a good night of sleep  7. Try to decrease stress in life; meditate at least 10 minutes before sleeping! Try it and let me know what works for you, try [employer] or apps like Calm and [employer]!     Please download apps:  1. \"My Fitness Pal\" (other option is Lose it)) to help you to monitor daily dietary intake and you will be able to see if you are eating the right amount of calories, protein, carbs                With My Fitness Pal-->When you set-up the mary anne or need to adjust settings:                Goals should include:                 Lose 1.5-2 lbs per week                Activity level: not very active (can't count exercise towards calorie number per day)                   ** Daily INPUT> Look at nutrition section-- \"nutrients\" and it will break down your macros for the day (ie. Protein, carbs, fibers, sugars and fats). Try to stay within these numbers daily     2. \"7 minute workout\" to help with  exercise/activity which takes 7 minutes of your day and that you can do at home!   3. \"Calm\" or \"Headspace\" which helps with mindfulness, meditation, clarity, sleep, and tierra to your daily life.   4. Skinnytaste blog for healthy recipe ideas  5. DietOneTwoTrip for low carb resources     HIGH PROTEIN SNACK IDEAS  -cottage cheese  -plain yogurt  -kefir  -hard-boiled eggs  -natural cheeses  -nuts (measure portion size)   -unsweetened nut butters  -dried edamame   -darlyn seeds soaked in water or almond milk  -soy nuts  -cured meats (monitor for sodium issues)   -hummus with vegetables  -bean dip with vegetables     FRUIT  Low carb fruit options   Raspberries: Half a cup (60 grams) contains 3 grams of carbs.  Blackberries: Half a cup (70 grams) contains 4 grams of carbs.  Strawberries: Half a cup (100 grams) contains 6 grams of carbs.  Blueberries: Half a cup (50 grams) contains 6 grams of carbs.  Plum: One medium-sized (80 grams) contains 6 grams of carbs.     VEGETABLES  Low carb vegetables             Understanding Carbohydrates  Goal <100g  A car needs the right type of fuel to run. And you need the right kind of food to function. To keep your energy level up, your body needs food that has carbohydrates (carbs). But carbs raise blood sugar levels higher and faster than other kinds of food. Your dietitian will work with you to figure out the amount of carbs youneed. Carbs come in 3 types: starches, sugars, and fiber.   Starches  Starches are found in grains, some vegetables, and beans. Grain products include bread, pasta, cereal, and tortillas. Starchy vegetables include potatoes, peas, corn, lima beans, yams, and squash. Kidney beans, black beans,black beans, garbanzo beans, and lentils also have starches.     Sugars  Sugars are found naturally in many foods. Or they can be added. Foods that contain natural sugar include fruits and fruit juices, dairy products, honey, and molasses. Added sugars are found in most  desserts, processed foods, candy, regular soda, and fruit drinks. These are very helpful to treat low blood sugar (hypoglycemia). They give you sugar quickly. Try to keep at least 15 to 20 grams of these simple sugars with you at all times. Eat or drink these if you start to havesymptoms of low blood sugar.   Fiber  Fiber comes from plant foods. Your body can't digest most fiber. Instead of raising blood sugar levels like other carbs, fiber stops blood sugar from rising too fast. Fiber is found in fruits, vegetables, whole grains, beans,peas, and many nuts.   Carb counting  Keep track of the amount of carbs you eat. This can help you keep the right balance of carbs, physical activity, and medicine. The amount of carbs you need will be different from what other people need. How much you need depends on many things. These include your health, the medicines you take, and how active you are. Your healthcare team will help you figure out the right amount of carbs for you. You may start with 45 to 60 grams of carbs per meal, depending on your case. Carb counting is a system that helps you keep track of thecarbohydrates you eat at each meal.   Carbs come from many foods. These include grains, starchy vegetables, fruit, milk, beans, and snack foods. You can either count carbohydrate grams or carbohydrate servings. When you count carbohydrate servings, 1 carbohydrateserving = 15 grams of carbohydrates.   Here are some examples of foods that have about 15 grams of carbs (1 serving of carbohydrates):   1/2 cup of canned or frozen fruit  A small piece of fresh fruit (4 ounces)  1 slice of bread  1/2 cup of oatmeal  1/3 cup of rice  4 to 6 crackers  1/2 English muffin  1/2 cup of black beans  1/4 of a large baked potato (3 ounces)  2/3 cup of plain fat-free yogurt  1 cup of soup  1/2 cup of casserole  6 chicken nuggets  2-inch-square brownie or cake without frosting  2 small cookies  1/2 cup of ice cream or sherbet  Carb  counting is easier when food labels are available. Look at the label to see how many grams of total carbs per serving the food contains. Then you can figure out how much you should eat. If your food doesn't have a nutrition label, you should be able to get an idea how many carbs there are per servingby using a book or website.   Two very important lines to look at on the label are the serving size and the total carbohydrate amount per serving. Here are some tips for using food labelsto count your carbs:   Check the serving size. The information on the label is based on that serving size. If you eat more than the listed serving size, you may have to double or triple the other information on the label.   Check the total grams of carbs.  Total carbohydrate from the label includes sugar, starch, and fiber. Be sure to use the total carbohydrate number (minus the fiber) and not sugar alone.  Know how many grams of carbs you can have.  Be familiar with the matching portion sizes.  Compare labels. Compare the labels of different products. Look at serving sizes and total carbs to find the products that work best for you.   Don't forget protein and fat. With the focus on carb counting, it might be easy to forget protein and fat in your meals. Don't forget to include sources of protein and healthy fat to balance your meals. Also watch how much salt (sodium) you eat. This is especially true if you have high blood pressure. If you have diabetes, limit the amount of sodium to less than 2,300 mg a day.    It’s also important to be consistent with the amount of carbs and time you eat when taking a fixed dose of diabetes medicine. Work with your healthcare provider or dietitian if you need more help. They can help you keep track of your carbs. They can also help you figure out how many grams of carbs youshould have.

## 2024-08-09 ENCOUNTER — LAB ENCOUNTER (OUTPATIENT)
Dept: LAB | Facility: REFERENCE LAB | Age: 59
End: 2024-08-09
Attending: INTERNAL MEDICINE
Payer: COMMERCIAL

## 2024-08-09 DIAGNOSIS — L65.9 HAIR LOSS: ICD-10-CM

## 2024-08-09 LAB — TSI SER-ACNC: 2.71 MIU/ML (ref 0.55–4.78)

## 2024-08-09 PROCEDURE — 36415 COLL VENOUS BLD VENIPUNCTURE: CPT

## 2024-08-09 PROCEDURE — 84443 ASSAY THYROID STIM HORMONE: CPT

## 2024-09-20 ENCOUNTER — OFFICE VISIT (OUTPATIENT)
Dept: PODIATRY CLINIC | Facility: CLINIC | Age: 59
End: 2024-09-20
Payer: COMMERCIAL

## 2024-09-20 ENCOUNTER — HOSPITAL ENCOUNTER (OUTPATIENT)
Dept: GENERAL RADIOLOGY | Facility: HOSPITAL | Age: 59
Discharge: HOME | End: 2024-09-20
Attending: PODIATRIST
Payer: COMMERCIAL

## 2024-09-20 VITALS — DIASTOLIC BLOOD PRESSURE: 72 MMHG | SYSTOLIC BLOOD PRESSURE: 109 MMHG

## 2024-09-20 DIAGNOSIS — M79.672 LEFT FOOT PAIN: ICD-10-CM

## 2024-09-20 DIAGNOSIS — R52 PAIN: ICD-10-CM

## 2024-09-20 DIAGNOSIS — R52 PAIN: Primary | ICD-10-CM

## 2024-09-20 DIAGNOSIS — M25.572 SINUS TARSI SYNDROME, LEFT: ICD-10-CM

## 2024-09-20 PROCEDURE — 20605 DRAIN/INJ JOINT/BURSA W/O US: CPT | Performed by: PODIATRIST

## 2024-09-20 PROCEDURE — 99203 OFFICE O/P NEW LOW 30 MIN: CPT | Performed by: PODIATRIST

## 2024-09-20 PROCEDURE — 3074F SYST BP LT 130 MM HG: CPT | Performed by: PODIATRIST

## 2024-09-20 PROCEDURE — 3078F DIAST BP <80 MM HG: CPT | Performed by: PODIATRIST

## 2024-09-20 PROCEDURE — 73630 X-RAY EXAM OF FOOT: CPT | Performed by: PODIATRIST

## 2024-09-20 RX ORDER — TRIAMCINOLONE ACETONIDE 40 MG/ML
20 INJECTION, SUSPENSION INTRA-ARTICULAR; INTRAMUSCULAR ONCE
Status: COMPLETED | OUTPATIENT
Start: 2024-09-20 | End: 2024-09-20

## 2024-09-20 RX ADMIN — TRIAMCINOLONE ACETONIDE 20 MG: 40 INJECTION, SUSPENSION INTRA-ARTICULAR; INTRAMUSCULAR at 16:20:00

## 2024-09-20 NOTE — PROGRESS NOTES
Per Dr. Garcia draw up 0.5ml of 0.5% Marcaine and 0.5ml of Kenalog 40 for injection to left foot.

## 2024-09-26 NOTE — PROGRESS NOTES
Paula Gordon is a 59 year old female.   Chief Complaint   Patient presents with    Foot Pain      Consult - L foot  - Pt states pain on top of foot. Pain mostly going up or down the stairs.  No recent injury.          HPI:   This pleasant patient presents she has been having some left foot pain and discomfort she points to the outside of her left foot and ankle.  Severe pain on the top outside of the foot mostly going up or down the stairs no recent history of injury change in activities or shoe gear that she can remember.  At today's visit reviewed nurse's history as taken above, allergies medications and medical history as documented below.  All changes duly noted  Allergies: Patient has no known allergies.   Current Outpatient Medications   Medication Sig Dispense Refill    sertraline 50 MG Oral Tab Take 1 tablet (50 mg total) by mouth every morning. 90 tablet 3    losartan 25 MG Oral Tab Take 1 tablet (25 mg total) by mouth daily. 90 tablet 3    empagliflozin (JARDIANCE) 10 MG Oral Tab Take 1 tablet (10 mg total) by mouth daily. 90 tablet 3    semaglutide (OZEMPIC, 1 MG/DOSE,) 4 MG/3ML Subcutaneous Solution Pen-injector Inject 1 mg into the skin once a week. 10 mL 1    atorvastatin 10 MG Oral Tab Take 1 tablet (10 mg total) by mouth daily. 90 tablet 3    pantoprazole 40 MG Oral Tab EC Take 1 tablet (40 mg total) by mouth every morning. 90 tablet 2    ONETOUCH ULTRA In Vitro Strip daily.      loratadine 10 MG Oral Tab       multivitamin Oral Tab Take 1 tablet by mouth daily.        Past Medical History:    Diabetes (HCC)    Esophageal reflux    High blood pressure    High cholesterol      Past Surgical History:   Procedure Laterality Date    Adenoidectomy      Colonoscopy N/A 10/31/2017    Procedure: COLONOSCOPY;  Surgeon: Jamal Bower MD;  Location: [facility] ENDOSCOPY    Colonoscopy N/A 12/28/2019    Procedure: COLONOSCOPY;  Surgeon: Jamal Bower MD;  Location: [facility] ENDOSCOPY    D & c       Ghada biopsy stereo nodule 1 site right (cpt=19081)      samira 2013    Other surgical history      per NG: excision lession, local    Tonsillectomy        Family History   Problem Relation Age of Onset    Cancer Father         bladder    Hypertension Father     Gastro-Intestinal Disorder Other         family h/o diverticular disease    Dementia Mother         Mother is 93/yo with noticeable signs of Dementia    Cancer Maternal Grandmother         stomach cancer    Diabetes Paternal Grandmother     Gastro-Intestinal Disorder Brother     Other (Other) Brother       Social History     Socioeconomic History    Marital status:    Tobacco Use    Smoking status: Former    Smokeless tobacco: Never    Tobacco comments:     Smoked socially, quit 35+ years ago   Vaping Use    Vaping status: Never Used   Substance and Sexual Activity    Alcohol use: Yes     Comment: Social drinker - 5-10/month    Drug use: Never    Sexual activity: Yes   Other Topics Concern    Caffeine Concern Yes     Comment: Daily    Exercise Yes     Comment: walking           REVIEW OF SYSTEMS:   Today reviewed systens as documented below  GENERAL HEALTH: feels well otherwise  SKIN: Refer to exam below  RESPIRATORY: denies shortness of breath with exertion  CARDIOVASCULAR: denies chest pain on exertion  GI: denies abdominal pain and denies heartburn  NEURO: denies headaches    EXAM:   /72   LMP  (LMP Unknown)   GENERAL: well developed, well nourished, in no apparent distress  EXTREMITIES:   1. Integument: Skin on her left foot and ankle was evaluated is warm and dry seems to have a little puffiness over the anterior lateral aspect of the foot and ankle in the vicinity of the sinus tarsi   2. Vascular: Patient has palpable dorsalis pedis posterior tibial pulses on the left   3. Neurologic: Patient has intact sensorium on the   4. Musculoskeletal: Patient has good muscle strength on the left has pain on palpation of the sinus tarsi  calcaneocuboid and fourth and fifth met cuboid joints do not seem to be symptomatic.  X-rays were taken I did evaluate them might have some mild narrowing of the middle facet of the subtalar joint otherwise all other joints look good no fractures no dislocations noted    ASSESSMENT AND PLAN:   Diagnoses and all orders for this visit:    Pain  -     XR FOOT WEIGHTBEARING (3 VIEWS), LEFT (CPT=73630); Future  -     triamcinolone acetonide (Kenalog-40) 40 MG/ML injection 20 mg    Sinus tarsi syndrome, left  -     triamcinolone acetonide (Kenalog-40) 40 MG/ML injection 20 mg        Plan: Today educated the patient about the problem recommended she wear good stability shoe that keeps her heel stable.Today discussed the nature and extent of a cortisone injection as well as the possible complications and risks.  Patient was in agreement to receive the injection.  The patient was consented for a cortisone injection and after timeout was taken the injection consisting of 20 mg Kenalog and 0.5 cc of 0.5% Marcaine plain was injected into the symptomatic sinus tarsi of the left foot, using aseptic technique and appropriate approach.  A Band-Aid was applied to the injection site.   Would recommend refraining from hard impact athletics and follow-up with me again in a few weeks.  The patient indicates understanding of these issues and agrees to the plan.    Bijan Garcia DPM

## 2024-10-04 DIAGNOSIS — N18.30 CONTROLLED TYPE 2 DIABETES MELLITUS WITH STAGE 3 CHRONIC KIDNEY DISEASE, WITHOUT LONG-TERM CURRENT USE OF INSULIN (HCC): ICD-10-CM

## 2024-10-04 DIAGNOSIS — E11.22 CONTROLLED TYPE 2 DIABETES MELLITUS WITH STAGE 3 CHRONIC KIDNEY DISEASE, WITHOUT LONG-TERM CURRENT USE OF INSULIN (HCC): ICD-10-CM

## 2024-10-07 RX ORDER — SEMAGLUTIDE 1.34 MG/ML
1 INJECTION, SOLUTION SUBCUTANEOUS WEEKLY
Qty: 9 ML | Refills: 1 | Status: SHIPPED | OUTPATIENT
Start: 2024-10-07

## 2024-10-07 NOTE — TELEPHONE ENCOUNTER
Refill passes per [facility] protocol.    Future Appointments   Date Time Provider Department Center   1/14/2025 11:40 AM Mariah Chatterjee MD [facility]     Last office visit: 8/7/2024    Requested Prescriptions   Pending Prescriptions Disp Refills    OZEMPIC, 1 MG/DOSE, 4 MG/3ML Subcutaneous Solution Pen-injector [Pharmacy Med Name: OZEMPIC 1MG  INJ 4MG/3ML] 9 mL 1     Sig: INJECT 1MG SUBCUTANEOUSLY  ONCE WEEKLY (EVERY 7 DAYS)       Diabetes Medication Protocol Passed - 10/4/2024  1:08 AM        Passed - Last A1C < 7.5 and within past 6 months     Lab Results   Component Value Date    A1C 5.8 (A) 08/07/2024             Passed - In person appointment or virtual visit in the past 6 mos or appointment in next 3 mos     Recent Outpatient Visits              2 weeks ago Pain    [facility] Bijan Garcia DPM    Office Visit    2 months ago Controlled type 2 diabetes mellitus with stage 3 chronic kidney disease, without long-term current use of insulin ([facility])    [facility] Stephanie Anne MD    Office Visit    5 months ago Controlled type 2 diabetes mellitus with stage 3 chronic kidney disease, without long-term current use of insulin ([facility])    [facility]    Office Visit    5 months ago Acute non-recurrent maxillary sinusitis    [facility] Dillon Noe MD    Office Visit    6 months ago Injury of triangular fibrocartilage complex (TFCC) of right wrist, initial encounter    [facility] Behar, Alex, MD    Office Visit          Future Appointments         Provider Department Appt Notes    In 3 weeks [address] for [facility]     In 3 months Mariah Chatterjee MD [facility] Patient of   [place]. Looking for new provider, due to doctors practice change.                    Passed - Microalbumin procedure in past 12 months or taking ACE/ARB        Passed - EGFRCR or GFRNAA > 50     GFR Evaluation  EGFRCR: 60 , resulted on 4/30/2024          Passed - GFR in the past 12 months             Future Appointments         Provider Department Appt Notes    In 3 weeks [address] for Health     In 3 months aMriah Chatterjee MD [facility] Patient of Dr. Anne. Looking for new provider, due to doctors practice change.          Recent Outpatient Visits              2 weeks ago Pain    [facility] Bijan Garcia DPM    Office Visit    2 months ago Controlled type 2 diabetes mellitus with stage 3 chronic kidney disease, without long-term current use of insulin ([facility])    [facility] Stephanie Anne MD    Office Visit    5 months ago Controlled type 2 diabetes mellitus with stage 3 chronic kidney disease, without long-term current use of insulin ([facility])    [facility]    Office Visit    5 months ago Acute non-recurrent maxillary sinusitis    [facility] Dillon Noe MD    Office Visit    6 months ago Injury of triangular fibrocartilage complex (TFCC) of right wrist, initial encounter    [facility] Behar, Alex, MD    Office Visit

## 2024-10-16 ENCOUNTER — PATIENT MESSAGE (OUTPATIENT)
Age: 59
End: 2024-10-16

## 2024-10-16 DIAGNOSIS — K21.9 GASTROESOPHAGEAL REFLUX DISEASE: ICD-10-CM

## 2024-10-17 RX ORDER — PANTOPRAZOLE SODIUM 40 MG/1
40 TABLET, DELAYED RELEASE ORAL EVERY MORNING
Qty: 90 TABLET | Refills: 2 | Status: SHIPPED | OUTPATIENT
Start: 2024-10-17

## 2024-10-29 ENCOUNTER — HOSPITAL ENCOUNTER (OUTPATIENT)
Dept: MAMMOGRAPHY | Facility: HOSPITAL | Age: 59
Discharge: HOME | End: 2024-10-29
Attending: INTERNAL MEDICINE
Payer: COMMERCIAL

## 2024-10-29 DIAGNOSIS — Z12.31 BREAST CANCER SCREENING BY MAMMOGRAM: ICD-10-CM

## 2024-10-29 PROCEDURE — 77067 SCR MAMMO BI INCL CAD: CPT | Performed by: INTERNAL MEDICINE

## 2024-10-29 PROCEDURE — 77063 BREAST TOMOSYNTHESIS BI: CPT | Performed by: INTERNAL MEDICINE

## 2024-12-04 ENCOUNTER — LAB ENCOUNTER (OUTPATIENT)
Dept: LAB | Age: 59
End: 2024-12-04
Attending: INTERNAL MEDICINE
Payer: COMMERCIAL

## 2024-12-04 ENCOUNTER — OFFICE VISIT (OUTPATIENT)
Dept: INTERNAL MEDICINE CLINIC | Facility: CLINIC | Age: 59
End: 2024-12-04
Payer: COMMERCIAL

## 2024-12-04 VITALS
DIASTOLIC BLOOD PRESSURE: 71 MMHG | HEART RATE: 73 BPM | SYSTOLIC BLOOD PRESSURE: 110 MMHG | HEIGHT: 63 IN | WEIGHT: 161.63 LBS | BODY MASS INDEX: 28.64 KG/M2

## 2024-12-04 DIAGNOSIS — E11.22 CONTROLLED TYPE 2 DIABETES MELLITUS WITH STAGE 3 CHRONIC KIDNEY DISEASE, WITHOUT LONG-TERM CURRENT USE OF INSULIN (HCC): ICD-10-CM

## 2024-12-04 DIAGNOSIS — Z00.00 PHYSICAL EXAM, ANNUAL: ICD-10-CM

## 2024-12-04 DIAGNOSIS — Z00.00 PHYSICAL EXAM, ANNUAL: Primary | ICD-10-CM

## 2024-12-04 DIAGNOSIS — N18.30 CONTROLLED TYPE 2 DIABETES MELLITUS WITH STAGE 3 CHRONIC KIDNEY DISEASE, WITHOUT LONG-TERM CURRENT USE OF INSULIN (HCC): ICD-10-CM

## 2024-12-04 LAB
ALBUMIN SERPL-MCNC: 4.9 G/DL (ref 3.2–4.8)
ALBUMIN/GLOB SERPL: 2 {RATIO} (ref 1–2)
ALP LIVER SERPL-CCNC: 88 U/L
ALT SERPL-CCNC: 19 U/L
ANION GAP SERPL CALC-SCNC: 6 MMOL/L (ref 0–18)
AST SERPL-CCNC: 18 U/L (ref ?–34)
BILIRUB SERPL-MCNC: 0.8 MG/DL (ref 0.2–1.1)
BUN BLD-MCNC: 17 MG/DL (ref 9–23)
BUN/CREAT SERPL: 15.5 (ref 10–20)
CALCIUM BLD-MCNC: 10.5 MG/DL (ref 8.7–10.4)
CHLORIDE SERPL-SCNC: 107 MMOL/L (ref 98–112)
CHOLEST SERPL-MCNC: 172 MG/DL (ref ?–200)
CO2 SERPL-SCNC: 29 MMOL/L (ref 21–32)
CREAT BLD-MCNC: 1.1 MG/DL
CREAT UR-SCNC: 82.9 MG/DL
DEPRECATED RDW RBC AUTO: 46 FL (ref 35.1–46.3)
EGFRCR SERPLBLD CKD-EPI 2021: 57 ML/MIN/1.73M2 (ref 60–?)
ERYTHROCYTE [DISTWIDTH] IN BLOOD BY AUTOMATED COUNT: 13.6 % (ref 11–15)
FASTING PATIENT LIPID ANSWER: YES
FASTING STATUS PATIENT QL REPORTED: YES
GLOBULIN PLAS-MCNC: 2.4 G/DL (ref 2–3.5)
GLUCOSE BLD-MCNC: 95 MG/DL (ref 70–99)
HCT VFR BLD AUTO: 40.9 %
HDLC SERPL-MCNC: 62 MG/DL (ref 40–59)
HGB BLD-MCNC: 13 G/DL
LDLC SERPL CALC-MCNC: 91 MG/DL (ref ?–100)
MCH RBC QN AUTO: 28.9 PG (ref 26–34)
MCHC RBC AUTO-ENTMCNC: 31.8 G/DL (ref 31–37)
MCV RBC AUTO: 90.9 FL
MICROALBUMIN UR-MCNC: <0.3 MG/DL
NONHDLC SERPL-MCNC: 110 MG/DL (ref ?–130)
OSMOLALITY SERPL CALC.SUM OF ELEC: 295 MOSM/KG (ref 275–295)
PLATELET # BLD AUTO: 280 10(3)UL (ref 150–450)
POTASSIUM SERPL-SCNC: 5.1 MMOL/L (ref 3.5–5.1)
PROT SERPL-MCNC: 7.3 G/DL (ref 5.7–8.2)
RBC # BLD AUTO: 4.5 X10(6)UL
SODIUM SERPL-SCNC: 142 MMOL/L (ref 136–145)
TRIGL SERPL-MCNC: 104 MG/DL (ref 30–149)
VLDLC SERPL CALC-MCNC: 17 MG/DL (ref 0–30)
WBC # BLD AUTO: 7.7 X10(3) UL (ref 4–11)

## 2024-12-04 PROCEDURE — 80061 LIPID PANEL: CPT

## 2024-12-04 PROCEDURE — 36415 COLL VENOUS BLD VENIPUNCTURE: CPT

## 2024-12-04 PROCEDURE — 85027 COMPLETE CBC AUTOMATED: CPT

## 2024-12-04 PROCEDURE — 3008F BODY MASS INDEX DOCD: CPT | Performed by: INTERNAL MEDICINE

## 2024-12-04 PROCEDURE — 82043 UR ALBUMIN QUANTITATIVE: CPT

## 2024-12-04 PROCEDURE — 3074F SYST BP LT 130 MM HG: CPT | Performed by: INTERNAL MEDICINE

## 2024-12-04 PROCEDURE — 80053 COMPREHEN METABOLIC PANEL: CPT

## 2024-12-04 PROCEDURE — 99396 PREV VISIT EST AGE 40-64: CPT | Performed by: INTERNAL MEDICINE

## 2024-12-04 PROCEDURE — 3078F DIAST BP <80 MM HG: CPT | Performed by: INTERNAL MEDICINE

## 2024-12-04 PROCEDURE — 82570 ASSAY OF URINE CREATININE: CPT

## 2024-12-04 NOTE — PROGRESS NOTES
Paula Gordon is a 59 year old female.  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 90 tablet 2    semaglutide (OZEMPIC, 1 MG/DOSE,) 4 MG/3ML Subcutaneous Solution Pen-injector Inject 1 mg into the skin once a week. 9 mL 1    sertraline 50 MG Oral Tab Take 1 tablet (50 mg total) by mouth every morning. 90 tablet 3    losartan 25 MG Oral Tab Take 1 tablet (25 mg total) by mouth daily. 90 tablet 3    empagliflozin (JARDIANCE) 10 MG Oral Tab Take 1 tablet (10 mg total) by mouth daily. 90 tablet 3    atorvastatin 10 MG Oral Tab Take 1 tablet (10 mg total) by mouth daily. 90 tablet 3    loratadine 10 MG Oral Tab       multivitamin Oral Tab Take 1 tablet by mouth daily.      ONETOUCH ULTRA In Vitro Strip daily. (Patient not taking: Reported on 12/4/2024)        Past Medical History:    Diabetes (HCC)    Esophageal reflux    High blood pressure    High cholesterol      Past Surgical History:   Procedure Laterality Date    Adenoidectomy      Colonoscopy N/A 10/31/2017    Procedure: COLONOSCOPY;  Surgeon: Jamal Bower MD;  Location: [facility] ENDOSCOPY    Colonoscopy N/A 12/28/2019    Procedure: COLONOSCOPY;  Surgeon: Jamal Bower MD;  Location: [facility] ENDOSCOPY    D & c      Ghada biopsy stereo nodule 1 site right (cpt=19081)      samira 2013    Other surgical history      per NG:  excision lession, local    Tonsillectomy        Social History:  Social History     Socioeconomic History    Marital status:    Tobacco Use    Smoking status: Former    Smokeless tobacco: Never    Tobacco comments:     Smoked socially, quit 35+ years ago   Vaping Use    Vaping status: Never Used   Substance and Sexual Activity    Alcohol use: Yes     Comment: Social drinker - 5-10/month    Drug use: Never    Sexual activity: Yes   Other Topics Concern    Caffeine Concern Yes     Comment: Daily    Exercise Yes     Comment: walking   Social History Narrative    The patient does not use an assistive device..      The patient does live in a home with stairs.        REVIEW OF SYSTEMS:   GENERAL HEALTH: No fevers, chills, sweats, fatigue  VISION: No recent vision problems, blurry vision or double vision  HEENT: No decreased hearing ear pain nasal congestion or sore throat  SKIN: denies any unusual skin lesions or rashes  RESPIRATORY: denies shortness of breath, cough, wheezing  CARDIOVASCULAR: denies chest pain on exertion, palpitations, swelling in feet  GI: denies abdominal pain and denies heartburn, nausea or vomiting  : No Pain on urination, change in the color of urine, discharge, urinating frequently  MUS: No back pain, joint pain, muscle pain  NEURO: denies headaches , anxiety, depression    EXAM:   /71   Pulse 73   Ht 5' 3\" (1.6 m)   Wt 161 lb 9.6 oz (73.3 kg)   LMP  (LMP Unknown)   BMI 28.63 kg/m²   GENERAL: well developed, well nourished,in no apparent distress   SKIN: no rashes,no suspicious lesions  HEENT: atraumatic, normocephalic,ears and throat are clear, no frontal or maxillary sinus tenderness, pupils equal reactive to light bilaterally, extraocular muscles intact  NECK: supple,no adenopathy,nontender   LUNGS: clear to auscultation, no wheeze  CARDIO: RRR without murmur  GI: good BS's,no masses or tenderness  EXTREMITIES: no cyanosis, or edema    ASSESSMENT AND PLAN:   Diagnoses and  all orders for this visit:    Physical exam, annual  -     Microalb/Creat Ratio, Random Urine; Future  -     Comp Metabolic Panel (14); Future  -     Lipid Panel; Future  -     CBC, Platelet; No Differential; Future  Advised patient to watch what she eats exercise, seatbelt use no texting and driving, sunscreen use advised    Controlled type 2 diabetes mellitus with stage 3 chronic kidney disease, without long-term current use of insulin (HCC)  -     Microalb/Creat Ratio, Random Urine; Future    bl sugars   Hba1c 5.8 8/2024   U. Micro 4/2024   Eye exam  dr resendez in [facility] ,on  arb , statin   Foot 5/2024  Diet -- advised to follow a low carb, low sugar diet , try to be consistent    Exercise 30 min a day         Preventative medicine  Mammogram 10/2024   Colonoscopy 5/17/2023 and rpt in 2028   Pap smear 2021 dr hayward   EGD for Carrasco's 5/2023 rpt in 5 yrs   Labs 3/2023 8/2024 reviewed     The patient indicates understanding of these issues and agrees to the plan.  No follow-ups on file.

## 2025-03-14 ENCOUNTER — APPOINTMENT (OUTPATIENT)
Dept: GENERAL RADIOLOGY | Age: 60
End: 2025-03-14
Attending: PHYSICIAN ASSISTANT
Payer: COMMERCIAL

## 2025-03-14 ENCOUNTER — HOSPITAL ENCOUNTER (OUTPATIENT)
Age: 60
Discharge: HOME | End: 2025-03-14
Payer: COMMERCIAL

## 2025-03-14 VITALS
RESPIRATION RATE: 18 BRPM | HEART RATE: 74 BPM | TEMPERATURE: 99 F | SYSTOLIC BLOOD PRESSURE: 112 MMHG | OXYGEN SATURATION: 96 % | DIASTOLIC BLOOD PRESSURE: 70 MMHG
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
## 2025-03-14 DIAGNOSIS — M79.641 HAND PAIN, RIGHT: ICD-10-CM

## 2025-03-14 DIAGNOSIS — M25.562 ACUTE PAIN OF LEFT KNEE: ICD-10-CM

## 2025-03-14 PROCEDURE — 73610 X-RAY EXAM OF ANKLE: CPT | Performed by: PHYSICIAN ASSISTANT

## 2025-03-14 PROCEDURE — 73130 X-RAY EXAM OF HAND: CPT | Performed by: PHYSICIAN ASSISTANT

## 2025-03-14 PROCEDURE — 73560 X-RAY EXAM OF KNEE 1 OR 2: CPT | Performed by: PHYSICIAN ASSISTANT

## 2025-03-14 PROCEDURE — 99213 OFFICE O/P EST LOW 20 MIN: CPT | Performed by: PHYSICIAN ASSISTANT

## 2025-03-14 NOTE — ED INITIAL ASSESSMENT (HPI)
Pt presents to the IC with c/o right hand, left knee and left lateral foot pain after falling while trying to catch her family member who was stumbling forward and fell. No head injury or LOC.

## 2025-03-14 NOTE — ED PROVIDER NOTES
Patient Seen in: [facility]      History     Chief Complaint   Patient presents with    Fall     Stated Complaint: L- knee foot, R- hand pain    Subjective:   HPI    60-year-old female history of hypertension, diabetes presenting to the immediate care for evaluation after mechanical fall a few hours ago.  Patient was assisting her elderly aunt to a doctor's appointment when the aunt stumbled on a carpet.  Patient attempted to brace the fall and ended up falling herself.  She complains of pain to the right hand as well as the left knee and left ankle.  She was able to stand under her own power and has been ambulatory since incident.  She does not appreciate any swelling or bruising to these areas.  She denies head injury.  No blood thinners.     Objective:     Past Medical History:    Diabetes (HCC)    Esophageal reflux    High blood pressure    High cholesterol              Past Surgical History:   Procedure Laterality Date    Adenoidectomy      Colonoscopy N/A 10/31/2017    Procedure: COLONOSCOPY;  Surgeon: Jamal Bower MD;  Location: [facility] ENDOSCOPY    Colonoscopy N/A 12/28/2019    Procedure: COLONOSCOPY;  Surgeon: Jamal Bower MD;  Location: [facility] ENDOSCOPY    D & c      Ghada biopsy stereo nodule 1 site right (cpt=19081)      [facility] 2013    Other surgical history      per : excision lession, local    Tonsillectomy                  Social History     Socioeconomic History    Marital status:    Tobacco Use    Smoking status: Former    Smokeless tobacco: Never    Tobacco comments:     Smoked socially, quit 35+ years ago   Vaping Use    Vaping status: Never Used   Substance and Sexual Activity    Alcohol use: Yes     Comment: Social drinker - 5-10/month    Drug use: Never    Sexual activity: Yes   Other Topics Concern    Caffeine Concern Yes     Comment: Daily    Exercise Yes     Comment: walking   Social History Narrative    The patient does not use an assistive device..       The patient does live in a home with stairs.              Review of Systems    Positive for stated complaint: L- knee foot, R- hand pain  Other systems are as noted in HPI.  Constitutional and vital signs reviewed.      All other systems reviewed and negative except as noted above.    Physical Exam     ED Triage Vitals [03/14/25 1614]   /70   Pulse 74   Resp 18   Temp 98.5 °F (36.9 °C)   Temp src Oral   SpO2 96 %   O2 Device None (Room air)       Current Vitals:   Vital Signs  BP: 112/70  Pulse: 74  Resp: 18  Temp: 98.5 °F (36.9 °C)  Temp src: Oral    Oxygen Therapy  SpO2: 96 %  O2 Device: None (Room air)        Physical Exam  Vitals and nursing note reviewed.   Constitutional:       General: She is not in acute distress.  HENT:      Head: Normocephalic and atraumatic.      Right Ear: External ear normal.      Left Ear: External ear normal.      Nose: Nose normal.      Mouth/Throat:      Mouth: Mucous membranes are moist.   Eyes:      Extraocular Movements: Extraocular movements intact.      Pupils: Pupils are equal, round, and reactive to light.   Cardiovascular:      Rate and Rhythm: Normal rate.   Pulmonary:      Effort: Pulmonary effort is normal.   Abdominal:      General: Abdomen is flat.   Musculoskeletal:         General: Normal range of motion.      Right wrist: Tenderness (along the ulnar side of hand. no edema, no ecchymosis) present.      Cervical back: Normal range of motion.      Left knee: Normal.      Left ankle: Tenderness present over the lateral malleolus.   Skin:     General: Skin is warm.   Neurological:      General: No focal deficit present.      Mental Status: She is alert and oriented to person, place, and time.   Psychiatric:         Mood and Affect: Mood normal.         Behavior: Behavior normal.             ED Course   Labs Reviewed - No data to display     60-year-old female presenting for evaluation of right hand pain, left knee pain, left foot pain after a fall earlier this  afternoon.  No head injury.  Patient neurovascularly intact    Ddx-knee sprain, contusion, fracture  Hand contusion, sprain, fracture  Ankle sprain, contusion, fracture, ligamentous injury    X-rays negative for acute osseous abnormality.  There is some calcification at the tip of the lateral malleolus which suggest a remote  injury.       Ace wrap placed to the left ankle/foot.  A removable wrist splint paste to the right hand/wrist.  NSAIDs, activity modification and return precautions reviewed  MDM              Medical Decision Making      Disposition and Plan     Clinical Impression:  1. Fall, initial encounter    2. Acute pain of left knee    3. Hand pain, right         Disposition:  Discharge  3/14/2025  5:46 pm    Follow-up:  Mariah Chatterjee MD  [address] [place] [address] [id number] [phone]                Medications Prescribed:  Current Discharge Medication List              Supplementary Documentation:

## 2025-03-21 NOTE — TELEPHONE ENCOUNTER
Refill passed per [address] [place] protocol.    Requested Prescriptions   Pending Prescriptions Disp Refills    ATORVASTATIN 10 MG Oral Tab [Pharmacy Med Name: ATORVASTATIN TAB 10MG] 90 tablet 1     Sig: TAKE 1 TABLET DAILY        Cholesterol Medication Prot 2 = A lot of assistance

## 2025-04-01 DIAGNOSIS — E11.22 CONTROLLED TYPE 2 DIABETES MELLITUS WITH STAGE 3 CHRONIC KIDNEY DISEASE, WITHOUT LONG-TERM CURRENT USE OF INSULIN (HCC): ICD-10-CM

## 2025-04-01 DIAGNOSIS — N18.30 CONTROLLED TYPE 2 DIABETES MELLITUS WITH STAGE 3 CHRONIC KIDNEY DISEASE, WITHOUT LONG-TERM CURRENT USE OF INSULIN (HCC): ICD-10-CM

## 2025-04-03 ENCOUNTER — TELEPHONE (OUTPATIENT)
Dept: INTERNAL MEDICINE CLINIC | Facility: CLINIC | Age: 60
End: 2025-04-03

## 2025-04-03 ENCOUNTER — LAB ENCOUNTER (OUTPATIENT)
Dept: LAB | Age: 60
End: 2025-04-03
Attending: INTERNAL MEDICINE
Payer: COMMERCIAL

## 2025-04-03 ENCOUNTER — OFFICE VISIT (OUTPATIENT)
Dept: INTERNAL MEDICINE CLINIC | Facility: CLINIC | Age: 60
End: 2025-04-03
Payer: COMMERCIAL

## 2025-04-03 VITALS
SYSTOLIC BLOOD PRESSURE: 112 MMHG | TEMPERATURE: 97 F | HEART RATE: 77 BPM | BODY MASS INDEX: 28.49 KG/M2 | OXYGEN SATURATION: 98 % | HEIGHT: 63 IN | WEIGHT: 160.81 LBS | DIASTOLIC BLOOD PRESSURE: 72 MMHG

## 2025-04-03 DIAGNOSIS — N64.4 BREAST PAIN, RIGHT: Primary | ICD-10-CM

## 2025-04-03 DIAGNOSIS — F41.9 ANXIETY: ICD-10-CM

## 2025-04-03 DIAGNOSIS — E83.52 HYPERCALCEMIA: ICD-10-CM

## 2025-04-03 DIAGNOSIS — Z23 NEED FOR VACCINATION: ICD-10-CM

## 2025-04-03 DIAGNOSIS — K21.9 GASTROESOPHAGEAL REFLUX DISEASE, UNSPECIFIED WHETHER ESOPHAGITIS PRESENT: ICD-10-CM

## 2025-04-03 DIAGNOSIS — E11.22 CONTROLLED TYPE 2 DIABETES MELLITUS WITH STAGE 3 CHRONIC KIDNEY DISEASE, WITHOUT LONG-TERM CURRENT USE OF INSULIN (HCC): ICD-10-CM

## 2025-04-03 DIAGNOSIS — Z13.820 SCREENING FOR OSTEOPOROSIS: ICD-10-CM

## 2025-04-03 DIAGNOSIS — N18.30 CONTROLLED TYPE 2 DIABETES MELLITUS WITH STAGE 3 CHRONIC KIDNEY DISEASE, WITHOUT LONG-TERM CURRENT USE OF INSULIN (HCC): ICD-10-CM

## 2025-04-03 DIAGNOSIS — E78.00 HYPERCHOLESTEROLEMIA: ICD-10-CM

## 2025-04-03 DIAGNOSIS — Z78.0 ASYMPTOMATIC MENOPAUSAL STATE: ICD-10-CM

## 2025-04-03 DIAGNOSIS — Z91.89 AT RISK FOR DECREASED BONE DENSITY: ICD-10-CM

## 2025-04-03 LAB
ANION GAP SERPL CALC-SCNC: 8 MMOL/L (ref 0–18)
BUN BLD-MCNC: 16 MG/DL (ref 9–23)
BUN/CREAT SERPL: 14.3 (ref 10–20)
CALCIUM BLD-MCNC: 9.6 MG/DL (ref 8.7–10.4)
CHLORIDE SERPL-SCNC: 104 MMOL/L (ref 98–112)
CO2 SERPL-SCNC: 29 MMOL/L (ref 21–32)
CREAT BLD-MCNC: 1.12 MG/DL
CREAT UR-SCNC: 103.4 MG/DL
EGFRCR SERPLBLD CKD-EPI 2021: 56 ML/MIN/1.73M2 (ref 60–?)
FASTING STATUS PATIENT QL REPORTED: NO
GLUCOSE BLD-MCNC: 94 MG/DL (ref 70–99)
HEMOGLOBIN A1C: 5.7 % (ref 4.3–5.6)
MICROALBUMIN UR-MCNC: <0.3 MG/DL
OSMOLALITY SERPL CALC.SUM OF ELEC: 293 MOSM/KG (ref 275–295)
POTASSIUM SERPL-SCNC: 4.6 MMOL/L (ref 3.5–5.1)
PTH-INTACT SERPL-MCNC: 56.3 PG/ML (ref 18.5–88)
SODIUM SERPL-SCNC: 141 MMOL/L (ref 136–145)

## 2025-04-03 PROCEDURE — 90471 IMMUNIZATION ADMIN: CPT | Performed by: INTERNAL MEDICINE

## 2025-04-03 PROCEDURE — 83970 ASSAY OF PARATHORMONE: CPT

## 2025-04-03 PROCEDURE — 3078F DIAST BP <80 MM HG: CPT | Performed by: INTERNAL MEDICINE

## 2025-04-03 PROCEDURE — 3008F BODY MASS INDEX DOCD: CPT | Performed by: INTERNAL MEDICINE

## 2025-04-03 PROCEDURE — 90677 PCV20 VACCINE IM: CPT | Performed by: INTERNAL MEDICINE

## 2025-04-03 PROCEDURE — G2211 COMPLEX E/M VISIT ADD ON: HCPCS | Performed by: INTERNAL MEDICINE

## 2025-04-03 PROCEDURE — 3074F SYST BP LT 130 MM HG: CPT | Performed by: INTERNAL MEDICINE

## 2025-04-03 PROCEDURE — 99215 OFFICE O/P EST HI 40 MIN: CPT | Performed by: INTERNAL MEDICINE

## 2025-04-03 PROCEDURE — 80048 BASIC METABOLIC PNL TOTAL CA: CPT

## 2025-04-03 PROCEDURE — 36415 COLL VENOUS BLD VENIPUNCTURE: CPT

## 2025-04-03 PROCEDURE — 82570 ASSAY OF URINE CREATININE: CPT

## 2025-04-03 PROCEDURE — 83036 HEMOGLOBIN GLYCOSYLATED A1C: CPT | Performed by: INTERNAL MEDICINE

## 2025-04-03 PROCEDURE — 82043 UR ALBUMIN QUANTITATIVE: CPT

## 2025-04-03 RX ORDER — ATORVASTATIN CALCIUM 10 MG/1
10 TABLET, FILM COATED ORAL DAILY
Qty: 90 TABLET | Refills: 3 | Status: SHIPPED | OUTPATIENT
Start: 2025-04-03

## 2025-04-03 RX ORDER — PANTOPRAZOLE SODIUM 40 MG/1
40 TABLET, DELAYED RELEASE ORAL EVERY MORNING
Qty: 90 TABLET | Refills: 2 | Status: SHIPPED | OUTPATIENT
Start: 2025-04-03

## 2025-04-03 RX ORDER — SEMAGLUTIDE 1.34 MG/ML
INJECTION, SOLUTION SUBCUTANEOUS
Qty: 9 ML | Refills: 1 | OUTPATIENT
Start: 2025-04-03

## 2025-04-03 RX ORDER — LOSARTAN POTASSIUM 25 MG/1
25 TABLET ORAL DAILY
Qty: 90 TABLET | Refills: 3 | Status: SHIPPED | OUTPATIENT
Start: 2025-04-03

## 2025-04-03 RX ORDER — SEMAGLUTIDE 1.34 MG/ML
1 INJECTION, SOLUTION SUBCUTANEOUS WEEKLY
Qty: 9 ML | Refills: 1 | Status: SHIPPED | OUTPATIENT
Start: 2025-04-03

## 2025-04-03 NOTE — TELEPHONE ENCOUNTER
Please Review. Protocol Failed; No Protocol   Lab Results   Component Value Date     A1C 5.8 (A) 08/07/2024

## 2025-04-03 NOTE — TELEPHONE ENCOUNTER
Radiology called stating additional views mammogram order is incorrect, needs to be diagnostic mammogram of right breast.  Order pended, please review and advise.

## 2025-04-03 NOTE — PROGRESS NOTES
Palua Gordon is a 60 year old female.  Chief Complaint   Patient presents with    Follow - Up     Pt agrees to foot exam        HPI:   Patient comes for follow-up  C/C follow-up  C/o has a marker in her right breast and she feels once in a while she feels some discomfort   Fell one mn ago and since then she is has been feeling it more , also has back pain , left knee , left foot , and right wrist hurt and went to  for evaluation   Brother has MM       HISTORY  New patient  C/C Annual physical- niece and TRISHA elijah come here   C/o overall doing well         PMH  Dm2   HL  CTSyn- dr behar Barretts esophagus - dr enrique   GERD   Anxiety / depression         Drs   Dr george Dr [place] Dr behar [facility]- андрей   Derm - [facility] in [place]        Lives with  , retired -  for [employer]   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 90 tablet 3    empagliflozin (JARDIANCE) 10 MG Oral Tab Take 1 tablet (10 mg total) by mouth daily. 90 tablet 3    losartan 25 MG Oral Tab Take 1 tablet (25 mg total) by mouth daily. 90 tablet 3    pantoprazole 40 MG Oral Tab EC Take 1 tablet (40 mg total) by mouth every morning. 90 tablet 2    semaglutide (OZEMPIC, 1 MG/DOSE,) 4 MG/3ML Subcutaneous Solution Pen-injector Inject 1 mg into the skin once a week. 9 mL 1    atorvastatin 10 MG Oral Tab Take 1 tablet (10 mg total) by mouth daily. 90 tablet 3    ONETOUCH ULTRA In Vitro Strip daily.      loratadine 10 MG Oral Tab       multivitamin Oral Tab Take 1 tablet by mouth daily.        Past Medical History:    Diabetes (HCC)    Esophageal reflux    High blood pressure    High cholesterol      Past Surgical History:   Procedure Laterality Date    Adenoidectomy      Colonoscopy N/A 10/31/2017    Procedure: COLONOSCOPY;  Surgeon:  Jamal Bower MD;  Location: [facility] ENDOSCOPY    Colonoscopy N/A 12/28/2019    Procedure: COLONOSCOPY;  Surgeon: Jamal Bower MD;  Location: [facility] ENDOSCOPY    D & c      Ghada biopsy stereo nodule 1 site right (cpt=19081)      [facility] 2013    Other surgical history      per NG: excision lession, local    Tonsillectomy        Social History:  Social History     Socioeconomic History    Marital status:    Tobacco Use    Smoking status: Former    Smokeless tobacco: Never    Tobacco comments:     Smoked socially, quit 35+ years ago   Vaping Use    Vaping status: Never Used   Substance and Sexual Activity    Alcohol use: Yes     Comment: Social drinker - 5-10/month    Drug use: Never    Sexual activity: Yes   Other Topics Concern    Caffeine Concern Yes     Comment: Daily    Exercise Yes     Comment: walking   Social History Narrative    The patient does not use an assistive device..      The patient does live in a home with stairs.     Social Drivers of Health     Food Insecurity: No Food Insecurity (4/3/2025)    NCSS - Food Insecurity     Worried About Running Out of Food in the Last Year: No     Ran Out of Food in the Last Year: No   Transportation Needs: No Transportation Needs (4/3/2025)    NCSS - Transportation     Lack of Transportation: No   Housing Stability: Not At Risk (4/3/2025)    NCSS - Housing/Utilities     Has Housing: Yes     Worried About Losing Housing: No     Unable to Get Utilities: No        REVIEW OF SYSTEMS:   GENERAL HEALTH: No fevers, chills, sweats, fatigue  VISION: No recent vision problems, blurry vision or double vision  RESPIRATORY: denies shortness of breath, cough, wheezing  CARDIOVASCULAR: denies chest pain on exertion, palpitations, swelling in feet  NEURO: denies headaches , + anxiety, + depression- takes care of her mom and aunt who are both in their 90s , she talks to андрей     EXAM:   /72   Pulse 77   Temp 97.1 °F (36.2 °C)   Ht 5' 3\" (1.6 m)   Wt 160  lb 12.8 oz (72.9 kg)   LMP  (LMP Unknown)   SpO2 98%   BMI 28.48 kg/m²   GENERAL: well developed, well nourished,in no apparent distress  SKIN: no rashes,no suspicious lesions  BREAST: Bilateral breasts without any lumps, bilateral axilla without any lymphadenopathy, no nipple retraction or  discharge  But tenderness on the right breast 7-8 o'clock position  HEENT: atraumatic, normocephalic   NECK: supple,no adenopathy  LUNGS: clear to auscultation, no wheeze  CARDIO: RRR without murmur  GI: good BS's,no masses or tenderness  EXTREMITIES: no cyanosis, or edema  Bilateral barefoot skin diabetic exam is normal, visualized feet and the appearance is normal.  Bilateral monofilament/sensation of both feet is normal.  Pulsation pedal pulse exam of both lower legs/feet is normal as well.       ASSESSMENT AND PLAN:   Diagnoses and all orders for this visit:    Breast pain, right  -     KEVIN FRANCESCO 2D+3D DIAGNOSTIC ADDL VWS RIGHT  (ITC=79993/66104); Future  Diagnostic mammogram ordered  Hypercholesterolemia  -     pantoprazole 40 MG Oral Tab EC; Take 1 tablet (40 mg total) by mouth every morning.  -     atorvastatin 10 MG Oral Tab; Take 1 tablet (10 mg total) by mouth daily.  Follow low-fat low-cholesterol diet and exercise with a goal of 30 minutes a day, continue medications  Gastroesophageal reflux disease, unspecified whether esophagitis present  Stable on medications, continue, follow-up with GI, next EGD due in 2028  Need for vaccination  -     Prevnar 20 (PCV20) [83788]  Today  Hypercalcemia  -     Basic Metabolic Panel (8); Future  -     PTH, Intact [E]; Future  Recheck   Asymptomatic menopausal state  -     XR DEXA BONE DENSITOMETRY (CPT=28240); Future  And  At risk for decreased bone density  -     XR DEXA BONE DENSITOMETRY (CPT=83380); Future  And  Screening for osteoporosis  -     XR DEXA BONE DENSITOMETRY (CPT=77080); Future  Bone density scan ordered  Anxiety  -     sertraline 50 MG Oral Tab; Take 1 tablet (50  mg total) by mouth every morning.  Stable on medications and follows up with therapist,raghav meds -refilled     Controlled type 2 diabetes mellitus with stage 3 chronic kidney disease, without long-term current use of insulin ([facility])  -     POC Glycohemoglobin [98948]  -     Microalb/Creat Ratio, Random Urine; Future  -     empagliflozin (JARDIANCE) 10 MG Oral Tab; Take 1 tablet (10 mg total) by mouth daily.  -     losartan 25 MG Oral Tab; Take 1 tablet (25 mg total) by mouth daily.  -     semaglutide (OZEMPIC, 1 MG/DOSE,) 4 MG/3ML Subcutaneous Solution Pen-injector; Inject 1 mg into the skin once a week.     bl sugars   Hba1c 5.8 8/2024 and 5.7 on 4/3/2025  U. Micro 4/2024   Eye exam  -dr resendez in [facility] ,on  arb , statin   Foot 4/3/2025  Diet -- advised to follow a low carb, low sugar diet , try to be consistent                Exercise 30 min a day            Preventative medicine  Mammogram 10/2024   Colonoscopy 5/17/2023 and rpt in 2028   Pap smear 2021 dr hayward   EGD for Carrasco's 5/2023 rpt in 5 yrs ie 2028  Labs 4/2024 and 12/2024 reviewed       The patient indicates understanding of these issues and agrees to the plan.  No follow-ups on file.

## 2025-04-15 ENCOUNTER — HOSPITAL ENCOUNTER (OUTPATIENT)
Dept: BONE DENSITY | Facility: HOSPITAL | Age: 60
Discharge: HOME | End: 2025-04-15
Attending: INTERNAL MEDICINE
Payer: COMMERCIAL

## 2025-04-15 DIAGNOSIS — Z78.0 ASYMPTOMATIC MENOPAUSAL STATE: ICD-10-CM

## 2025-04-15 DIAGNOSIS — Z91.89 AT RISK FOR DECREASED BONE DENSITY: ICD-10-CM

## 2025-04-15 DIAGNOSIS — Z13.820 SCREENING FOR OSTEOPOROSIS: ICD-10-CM

## 2025-04-15 PROCEDURE — 77080 DXA BONE DENSITY AXIAL: CPT | Performed by: INTERNAL MEDICINE

## 2025-04-23 ENCOUNTER — HOSPITAL ENCOUNTER (OUTPATIENT)
Dept: MAMMOGRAPHY | Facility: HOSPITAL | Age: 60
Discharge: HOME | End: 2025-04-23
Payer: COMMERCIAL

## 2025-04-23 ENCOUNTER — PATIENT MESSAGE (OUTPATIENT)
Dept: INTERNAL MEDICINE CLINIC | Facility: CLINIC | Age: 60
End: 2025-04-23

## 2025-04-23 ENCOUNTER — HOSPITAL ENCOUNTER (OUTPATIENT)
Dept: ULTRASOUND IMAGING | Facility: HOSPITAL | Age: 60
Discharge: HOME | End: 2025-04-23
Payer: COMMERCIAL

## 2025-04-23 DIAGNOSIS — N64.4 BREAST PAIN, RIGHT: ICD-10-CM

## 2025-04-23 PROCEDURE — 76642 ULTRASOUND BREAST LIMITED: CPT

## 2025-04-23 PROCEDURE — 77061 BREAST TOMOSYNTHESIS UNI: CPT

## 2025-04-23 PROCEDURE — 77065 DX MAMMO INCL CAD UNI: CPT

## 2025-05-15 ENCOUNTER — TELEPHONE (OUTPATIENT)
Dept: INTERNAL MEDICINE CLINIC | Facility: CLINIC | Age: 60
End: 2025-05-15

## 2025-06-06 ENCOUNTER — OFFICE VISIT (OUTPATIENT)
Dept: OTOLARYNGOLOGY | Facility: CLINIC | Age: 60
End: 2025-06-06
Payer: COMMERCIAL

## 2025-06-06 ENCOUNTER — TELEPHONE (OUTPATIENT)
Dept: OTOLARYNGOLOGY | Facility: CLINIC | Age: 60
End: 2025-06-06

## 2025-06-06 DIAGNOSIS — R22.1 NECK MASS: Primary | ICD-10-CM

## 2025-06-06 PROCEDURE — 99203 OFFICE O/P NEW LOW 30 MIN: CPT | Performed by: SPECIALIST

## 2025-06-06 RX ORDER — CEPHALEXIN 500 MG/1
500 CAPSULE ORAL EVERY 8 HOURS
Qty: 30 CAPSULE | Refills: 0 | Status: SHIPPED | OUTPATIENT
Start: 2025-06-06 | End: 2025-06-06

## 2025-06-06 RX ORDER — CEPHALEXIN 500 MG/1
500 CAPSULE ORAL EVERY 8 HOURS
Qty: 30 CAPSULE | Refills: 0 | Status: SHIPPED | OUTPATIENT
Start: 2025-06-06

## 2025-06-06 NOTE — TELEPHONE ENCOUNTER
Per patient [facility] but she needs it to be sent to [facility] in [place] on [address] so she can start it today. Please advise

## 2025-06-07 NOTE — PROGRESS NOTES
Paula Gordon is a 60 year old female.   Chief Complaint   Patient presents with    Lump     Patient is here is due to lump under left ear     Throat Problem     Patient reports irritation in throat      HPI:   Patient here for a tender mass on her left neck.    Current Medications[1]   Past Medical History[2]   Social History:  Short Social Hx on File[3]     REVIEW OF SYSTEMS:   GENERAL HEALTH: feels well otherwise  GENERAL : denies fever, chills, sweats, weight loss, weight gain  SKIN: denies any unusual skin lesions or rashes  RESPIRATORY: denies shortness of breath with exertion  NEURO: denies headaches    EXAM:   LMP  (LMP Unknown)   System Details   Skin Inspection - Normal.   Constitutional Overall appearance - Normal.   Head/Face Facial features - Normal. Eyebrows - Normal. Skull - Normal.   Eyes Conjunctiva - Right: Normal, Left: Normal. Pupil - Right: Normal, Left: Normal.    Ears Inspection - Right: Normal, Left: Normal.   Canal - Right: Normal, Left: Normal.   TM - Right: Normal, Left: Normal.   Nasal External nose - Normal.   Nasal septum - Normal.  Turbinates - Normal.   Oral/Oropharynx Lips - Normal, Tonsils -absent, Tongue -absent   Neck Exam Inspection - Normal. Palpation - Normal. Parotid gland - Normal. Thyroid gland - Normal.  1 x 1 cm tender posterior inferior left neck mass.  Favor a lymph node.   Lymph Detail Submental. Submandibular. Anterior cervical. Posterior cervical. Supraclavicular all without enlargement   Larynx Mirror examination with posterior laryngeal erythema.  No retained secretions.  No tumors or masses seen.  Normal vocal cord mobility.   Psychiatric Orientation - Oriented to time, place, person & situation. Appropriate mood and affect.   Neurological Memory - Normal. Cranial nerves - Cranial nerves II through XII grossly intact.     ASSESSMENT AND PLAN:   1. Neck mass  Tender 1 x 1 cm left inferior posterior neck mass.  Favor a lymph node.  Patient placed on a trial  of Keflex.  If area does not completely resolved, an ultrasound will be recommended.      The patient indicates understanding of these issues and agrees to the plan.      Adelita Thorne MD  6/6/2025  9:14 PM       [1]   Current Outpatient Medications   Medication Sig Dispense Refill    sertraline 50 MG Oral Tab Take 1 tablet (50 mg total) by mouth every morning. 90 tablet 3    empagliflozin (JARDIANCE) 10 MG Oral Tab Take 1 tablet (10 mg total) by mouth daily. 90 tablet 3    losartan 25 MG Oral Tab Take 1 tablet (25 mg total) by mouth daily. 90 tablet 3    pantoprazole 40 MG Oral Tab EC Take 1 tablet (40 mg total) by mouth every morning. 90 tablet 2    semaglutide (OZEMPIC, 1 MG/DOSE,) 4 MG/3ML Subcutaneous Solution Pen-injector Inject 1 mg into the skin once a week. 9 mL 1    atorvastatin 10 MG Oral Tab Take 1 tablet (10 mg total) by mouth daily. 90 tablet 3    ONETOUCH ULTRA In Vitro Strip daily.      loratadine 10 MG Oral Tab       multivitamin Oral Tab Take 1 tablet by mouth daily.      cephALEXin 500 MG Oral Cap Take 1 capsule (500 mg total) by mouth every 8 (eight) hours. 30 capsule 0   [2]   Past Medical History:   Diabetes (HCC)    Esophageal reflux    High blood pressure    High cholesterol   [3]   Social History  Socioeconomic History    Marital status:    Tobacco Use    Smoking status: Former    Smokeless tobacco: Never    Tobacco comments:     Smoked socially, quit 35+ years ago   Vaping Use    Vaping status: Never Used   Substance and Sexual Activity    Alcohol use: Yes     Comment: Social drinker - 5-10/month    Drug use: Never    Sexual activity: Yes   Other Topics Concern    Caffeine Concern Yes     Comment: Daily    Exercise Yes     Comment: walking   Social History Narrative    The patient does not use an assistive device..      The patient does live in a home with stairs.     Social Drivers of Health     Food Insecurity: No Food Insecurity (4/3/2025)    NCSS - Food Insecurity     Worried  About Running Out of Food in the Last Year: No     Ran Out of Food in the Last Year: No   Transportation Needs: No Transportation Needs (4/3/2025)    NCSS - Transportation     Lack of Transportation: No   Housing Stability: Not At Risk (4/3/2025)    NCSS - Housing/Utilities     Has Housing: Yes     Worried About Losing Housing: No     Unable to Get Utilities: No

## 2025-06-07 NOTE — PATIENT INSTRUCTIONS
You were placed on Keflex for your left inferior neck mass.  Please call me if this does not resolve as an ultrasound will be recommended.

## 2025-06-17 ENCOUNTER — OFFICE VISIT (OUTPATIENT)
Dept: OTOLARYNGOLOGY | Facility: CLINIC | Age: 60
End: 2025-06-17

## 2025-06-17 VITALS — HEIGHT: 63 IN | BODY MASS INDEX: 28.35 KG/M2 | WEIGHT: 160 LBS

## 2025-06-17 DIAGNOSIS — R22.1 NECK MASS: Primary | ICD-10-CM

## 2025-06-17 PROCEDURE — 99213 OFFICE O/P EST LOW 20 MIN: CPT | Performed by: SPECIALIST

## 2025-06-17 PROCEDURE — 3008F BODY MASS INDEX DOCD: CPT | Performed by: SPECIALIST

## 2025-06-17 NOTE — PATIENT INSTRUCTIONS
There was an incomplete resolution of your left posterior neck mass after the Keflex.  As this is the case, an ultrasound was ordered.  I will of course notify you of all results.

## 2025-06-17 NOTE — PROGRESS NOTES
Paula Gordon is a 60 year old female.   Chief Complaint   Patient presents with    Follow - Up     neck mass     HPI:   Patient here took the Keflex.  He has a persistent mass in the area.    Current Medications[1]   Past Medical History[2]   Social History:  Short Social Hx on File[3]     REVIEW OF SYSTEMS:   GENERAL HEALTH: feels well otherwise  GENERAL : denies fever, chills, sweats, weight loss, weight gain  SKIN: denies any unusual skin lesions or rashes  RESPIRATORY: denies shortness of breath with exertion  NEURO: denies headaches    EXAM:   Ht 5' 3\" (1.6 m)   Wt 160 lb (72.6 kg)   LMP  (LMP Unknown)   BMI 28.34 kg/m²   System Details   Skin Inspection - Normal.   Constitutional Overall appearance - Normal.   Head/Face Facial features - Normal. Eyebrows - Normal. Skull - Normal.   Eyes Conjunctiva - Right: Normal, Left: Normal. Pupil - Right: Normal, Left: Normal.    Ears Inspection - Right: Normal, Left: Normal.   Canal - Right: Normal, Left: Normal.   TM - Right: Normal, Left: Normal.   Nasal External nose - Normal.   Nasal septum - Normal.  Turbinates - Normal.   Oral/Oropharynx Lips - Normal, Tonsils -absent, Tongue - Normal    Neck Exam Inspection - Normal. Palpation - Normal. Parotid gland - Normal. Thyroid gland - Normal.  Left posterior neck mass in the area of a level 5 lymph node.  Mobile.  About 1 cm in size.   Lymph Detail Submental. Submandibular. Anterior cervical. Posterior cervical. Supraclavicular all without enlargement   Psychiatric Orientation - Oriented to time, place, person & situation. Appropriate mood and affect.   Neurological Memory - Normal. Cranial nerves - Cranial nerves II through XII grossly intact.     ASSESSMENT AND PLAN:   1. Neck mass  Discussed the options of following up in 4 months time or getting an ultrasound.  The patient would prefer to get an ultrasound to better characterize the fullness in this area.  The order was placed.  I will of course notify her  of the results.  - US PAROTID (CPT=76536); Future      The patient indicates understanding of these issues and agrees to the plan.      Adelita Thorne MD  6/17/2025  4:23 PM       [1]   Current Outpatient Medications   Medication Sig Dispense Refill    cephALEXin 500 MG Oral Cap Take 1 capsule (500 mg total) by mouth every 8 (eight) hours. 30 capsule 0    sertraline 50 MG Oral Tab Take 1 tablet (50 mg total) by mouth every morning. 90 tablet 3    empagliflozin (JARDIANCE) 10 MG Oral Tab Take 1 tablet (10 mg total) by mouth daily. 90 tablet 3    losartan 25 MG Oral Tab Take 1 tablet (25 mg total) by mouth daily. 90 tablet 3    pantoprazole 40 MG Oral Tab EC Take 1 tablet (40 mg total) by mouth every morning. 90 tablet 2    semaglutide (OZEMPIC, 1 MG/DOSE,) 4 MG/3ML Subcutaneous Solution Pen-injector Inject 1 mg into the skin once a week. 9 mL 1    atorvastatin 10 MG Oral Tab Take 1 tablet (10 mg total) by mouth daily. 90 tablet 3    ONETOUCH ULTRA In Vitro Strip daily.      loratadine 10 MG Oral Tab       multivitamin Oral Tab Take 1 tablet by mouth daily.     [2]   Past Medical History:   Diabetes (HCC)    Esophageal reflux    High blood pressure    High cholesterol   [3]   Social History  Socioeconomic History    Marital status:    Tobacco Use    Smoking status: Former    Smokeless tobacco: Never    Tobacco comments:     Smoked socially, quit 35+ years ago   Vaping Use    Vaping status: Never Used   Substance and Sexual Activity    Alcohol use: Yes     Comment: Social drinker - 5-10/month    Drug use: Never    Sexual activity: Yes   Other Topics Concern    Caffeine Concern Yes     Comment: Daily    Exercise Yes     Comment: walking   Social History Narrative    The patient does not use an assistive device..      The patient does live in a home with stairs.     Social Drivers of Health     Food Insecurity: No Food Insecurity (4/3/2025)    NCSS - Food Insecurity     Worried About Running Out of Food in the  Last Year: No     Ran Out of Food in the Last Year: No   Transportation Needs: No Transportation Needs (4/3/2025)    NCSS - Transportation     Lack of Transportation: No   Housing Stability: Not At Risk (4/3/2025)    NCSS - Housing/Utilities     Has Housing: Yes     Worried About Losing Housing: No     Unable to Get Utilities: No

## 2025-06-19 ENCOUNTER — HOSPITAL ENCOUNTER (OUTPATIENT)
Dept: ULTRASOUND IMAGING | Facility: HOSPITAL | Age: 60
Discharge: HOME | End: 2025-06-19
Attending: SPECIALIST
Payer: COMMERCIAL

## 2025-06-19 DIAGNOSIS — R22.1 NECK MASS: ICD-10-CM

## 2025-06-19 PROCEDURE — 76536 US EXAM OF HEAD AND NECK: CPT | Performed by: SPECIALIST

## 2025-06-20 ENCOUNTER — PATIENT MESSAGE (OUTPATIENT)
Dept: OTOLARYNGOLOGY | Facility: CLINIC | Age: 60
End: 2025-06-20

## 2025-06-20 DIAGNOSIS — R59.1 HEAD AND NECK LYMPHADENOPATHY: Primary | ICD-10-CM

## 2025-06-20 NOTE — PROGRESS NOTES
Benign appearing lymph nodes in the left parotid and left neck.  Both look benign.  Patient to follow up in 4 - 6 months time, sooner if problems.

## 2025-06-23 NOTE — TELEPHONE ENCOUNTER
The best option would be an ultrasound guided fine needle aspirate.  I will send the order.  Message sent to the patient.

## 2025-06-23 NOTE — TELEPHONE ENCOUNTER
Dr. Thorne, see Paula's mychart message, she would like to get more imaging or another biopsy done. Please advise.

## 2025-07-09 ENCOUNTER — OFFICE VISIT (OUTPATIENT)
Dept: INTERNAL MEDICINE CLINIC | Facility: CLINIC | Age: 60
End: 2025-07-09
Payer: COMMERCIAL

## 2025-07-09 VITALS
HEART RATE: 73 BPM | DIASTOLIC BLOOD PRESSURE: 67 MMHG | WEIGHT: 161.81 LBS | SYSTOLIC BLOOD PRESSURE: 101 MMHG | BODY MASS INDEX: 28.67 KG/M2 | OXYGEN SATURATION: 100 % | HEIGHT: 63 IN

## 2025-07-09 DIAGNOSIS — Z12.31 SCREENING MAMMOGRAM, ENCOUNTER FOR: ICD-10-CM

## 2025-07-09 DIAGNOSIS — E55.9 VITAMIN D DEFICIENCY: ICD-10-CM

## 2025-07-09 DIAGNOSIS — N18.30 CONTROLLED TYPE 2 DIABETES MELLITUS WITH STAGE 3 CHRONIC KIDNEY DISEASE, WITHOUT LONG-TERM CURRENT USE OF INSULIN (HCC): ICD-10-CM

## 2025-07-09 DIAGNOSIS — E11.22 CONTROLLED TYPE 2 DIABETES MELLITUS WITH STAGE 3 CHRONIC KIDNEY DISEASE, WITHOUT LONG-TERM CURRENT USE OF INSULIN (HCC): ICD-10-CM

## 2025-07-09 DIAGNOSIS — E78.00 HYPERCHOLESTEROLEMIA: Primary | ICD-10-CM

## 2025-07-09 LAB — HEMOGLOBIN A1C: 5.7 % (ref 4.3–5.6)

## 2025-07-09 PROCEDURE — G2211 COMPLEX E/M VISIT ADD ON: HCPCS | Performed by: INTERNAL MEDICINE

## 2025-07-09 PROCEDURE — 3074F SYST BP LT 130 MM HG: CPT | Performed by: INTERNAL MEDICINE

## 2025-07-09 PROCEDURE — 83036 HEMOGLOBIN GLYCOSYLATED A1C: CPT | Performed by: INTERNAL MEDICINE

## 2025-07-09 PROCEDURE — 3008F BODY MASS INDEX DOCD: CPT | Performed by: INTERNAL MEDICINE

## 2025-07-09 PROCEDURE — 3078F DIAST BP <80 MM HG: CPT | Performed by: INTERNAL MEDICINE

## 2025-07-09 PROCEDURE — 3061F NEG MICROALBUMINURIA REV: CPT | Performed by: INTERNAL MEDICINE

## 2025-07-09 PROCEDURE — 99214 OFFICE O/P EST MOD 30 MIN: CPT | Performed by: INTERNAL MEDICINE

## 2025-07-09 PROCEDURE — 3044F HG A1C LEVEL LT 7.0%: CPT | Performed by: INTERNAL MEDICINE

## 2025-07-09 NOTE — PROGRESS NOTES
Paula Gordon is a 60 year old female.  Chief Complaint   Patient presents with    Follow - Up       HPI:   Pt comes for f/u   C/c follow up  C/o saw dr ernandez and will go for bx of her left parotid gland LN   She takes care of her mom 96  and aunt 99 and aunt got shingles and now is in a rehab   Will go to [place] on the 24th for her 25th wedding anniversary   Has a burning in the back of the throatt       HISTORY  4/2025  has a marker in her right breast and she feels once in a while she feels some discomfort   Fell one mn ago and since then she is has been feeling it more , also has back pain , left knee , left foot , and right wrist hurt and went to  for evaluation   Brother has MM         HISTORY  New patient  C/C Annual physical- niece and TRISHA elijah come here   C/o overall doing well         PMH  Dm2   HL  CTSyn- dr behar Barretts esophagus - dr enrique   GERD   Anxiety / depression         Drs Dr george Dr noorlag Dr behar [facility]- андрей   Derm - clear skin derm in [place]        Lives with  , retired -  for [employer]   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8 oz (73.4 kg)   LMP  (LMP Unknown)   SpO2 100%   BMI 28.66 kg/m²   GENERAL: well developed, well nourished,in no apparent distress  SKIN: no rashes,no suspicious lesions  HEENT: atraumatic, normocephalic  NECK:  supple  LUNGS: clear to auscultation, no wheeze  CARDIO: RRR without murmur  EXTREMITIES: no cyanosis, or edema    ASSESSMENT AND PLAN:   Diagnoses and all orders for this visit:    Hypercholesterolemia  -     Comp Metabolic Panel (14); Future  -     Lipid Panel; Future  -     TSH W Reflex To Free T4; Future  -     CBC, Platelet; No Differential; Future  Patient to follow low-fat low-cholesterol diet and exercise with a goal of 30 minutes a day, continue medications  Screening mammogram, encounter for  -     [facility] FRANCESCO 2D+3D SCREENING BILAT (CPT=77067/97490); Future  Order to be done in October  Vitamin D deficiency  -     Vitamin D; Future  -     CBC, Platelet; No Differential; Future  Recheck     Controlled type 2 diabetes mellitus with stage 3 chronic kidney disease, without long-term current use of insulin (HCC)  -     Comp Metabolic Panel (14); Future  -     Lipid Panel; Future  -     TSH W Reflex To Free T4; Future  -     Hemoglobin A1C; Future  -     CBC, Platelet; No Differential; Future  -     POC Glycohemoglobin [50213]   bl sugars   Hba1c 5.8 8/2024 and 5.7 on 4/3/2025  [facility] 4/2025  Eye exam  -dr resendez in [facility] ,on  arb , statin   Foot 4/3/2025  Diet -- advised to follow a low carb, low sugar diet , try to be consistent                Exercise 30 min a day            Preventative medicine  Mammogram 10/2024 ,ordered   Dexa 4/2025 normal   Colonoscopy 5/17/2023 and rpt in 2028   Pap smear 2021 dr mainor MILLS for Carrasco's 5/2023 rpt in 5 yrs ie 2028  Labs 4/2024 and 12/2024 reviewed       The patient indicates understanding of these issues and agrees to the plan.  No follow-ups on file.       [1]   Current Outpatient Medications   Medication Sig Dispense Refill    sertraline 50 MG Oral Tab Take 1 tablet (50 mg total) by mouth every morning. 90 tablet 3    empagliflozin (JARDIANCE) 10 MG Oral Tab Take 1 tablet (10 mg total) by mouth daily. 90 tablet 3    losartan 25 MG Oral Tab Take 1 tablet  (25 mg total) by mouth daily. 90 tablet 3    pantoprazole 40 MG Oral Tab EC Take 1 tablet (40 mg total) by mouth every morning. 90 tablet 2    semaglutide (OZEMPIC, 1 MG/DOSE,) 4 MG/3ML Subcutaneous Solution Pen-injector Inject 1 mg into the skin once a week. 9 mL 1    atorvastatin 10 MG Oral Tab Take 1 tablet (10 mg total) by mouth daily. 90 tablet 3    ONETOUCH ULTRA In Vitro Strip daily.      loratadine 10 MG Oral Tab       multivitamin Oral Tab Take 1 tablet by mouth daily.     [2]   Past Medical History:   Diabetes (HCC)    Esophageal reflux    High blood pressure    High cholesterol   [3]   Past Surgical History:  Procedure Laterality Date    Adenoidectomy      Colonoscopy N/A 10/31/2017    Procedure: COLONOSCOPY;  Surgeon: Jamal Bower MD;  Location: [facility] ENDOSCOPY    Colonoscopy N/A 12/28/2019    Procedure: COLONOSCOPY;  Surgeon: Jamal Bower MD;  Location: [facility] ENDOSCOPY    D & c      Ghada biopsy stereo nodule 1 site right (cpt=19081)      [facility] 2013    Other surgical history      per NG: excision lession, local    Tonsillectomy     [4]   Social History  Socioeconomic History    Marital status:    Tobacco Use    Smoking status: Former    Smokeless tobacco: Never    Tobacco comments:     Smoked socially, quit 35+ years ago   Vaping Use    Vaping status: Never Used   Substance and Sexual Activity    Alcohol use: Yes     Comment: Social drinker - 5-10/month    Drug use: Never    Sexual activity: Yes   Other Topics Concern    Caffeine Concern Yes     Comment: Daily    Exercise Yes     Comment: walking   Social History Narrative    The patient does not use an assistive device..      The patient does live in a home with stairs.     Social Drivers of Health     Food Insecurity: No Food Insecurity (4/3/2025)    NCSS - Food Insecurity     Worried About Running Out of Food in the Last Year: No     Ran Out of Food in the Last Year: No   Transportation Needs: No Transportation Needs (4/3/2025)     NCSS - Transportation     Lack of Transportation: No   Housing Stability: Not At Risk (4/3/2025)    NCSS - Housing/Utilities     Has Housing: Yes     Worried About Losing Housing: No     Unable to Get Utilities: No

## 2025-07-15 ENCOUNTER — MED REC SCAN ONLY (OUTPATIENT)
Dept: INTERNAL MEDICINE CLINIC | Facility: CLINIC | Age: 60
End: 2025-07-15

## 2025-07-16 ENCOUNTER — TELEPHONE (OUTPATIENT)
Dept: INTERNAL MEDICINE CLINIC | Facility: CLINIC | Age: 60
End: 2025-07-16

## 2025-08-14 ENCOUNTER — HOSPITAL ENCOUNTER (OUTPATIENT)
Dept: ULTRASOUND IMAGING | Facility: HOSPITAL | Age: 60
Discharge: HOME | End: 2025-08-14
Attending: SPECIALIST

## 2025-08-14 DIAGNOSIS — R59.1 HEAD AND NECK LYMPHADENOPATHY: ICD-10-CM

## 2025-08-14 PROCEDURE — 88173 CYTOPATH EVAL FNA REPORT: CPT | Performed by: SPECIALIST

## 2025-08-14 PROCEDURE — 10005 FNA BX W/US GDN 1ST LES: CPT | Performed by: SPECIALIST

## 2025-08-14 PROCEDURE — 88177 CYTP FNA EVAL EA ADDL: CPT | Performed by: SPECIALIST

## 2025-08-14 PROCEDURE — 88172 CYTP DX EVAL FNA 1ST EA SITE: CPT | Performed by: SPECIALIST

## 2025-08-15 LAB — NON GYNE INTERPRETATION: NEGATIVE

## (undated) DEVICE — MEDI-VAC NON-CONDUCTIVE SUCTION TUBING 6MM X 1.8M (6FT.) L: Brand: CARDINAL HEALTH

## (undated) DEVICE — FORCEP RADIAL JAW 4

## (undated) DEVICE — Device: Brand: DUAL NARE NASAL CANNULAE FEMALE LUER CON 7FT O2 TUBE

## (undated) DEVICE — 6 ML SYRINGE LUER-LOCK TIP: Brand: MONOJECT

## (undated) DEVICE — SNARE CAPTIFLEX MICRO-OVL OLY

## (undated) DEVICE — 35 ML SYRINGE REGULAR TIP: Brand: MONOJECT

## (undated) DEVICE — KIT ENDO ORCAPOD 160/180/190

## (undated) DEVICE — ENDOSCOPY PACK - LOWER: Brand: MEDLINE INDUSTRIES, INC.

## (undated) DEVICE — Device: Brand: DEFENDO AIR/WATER/SUCTION AND BIOPSY VALVE

## (undated) DEVICE — LINE MNTR ADLT SET O2 INTMD

## (undated) DEVICE — ENDOSCOPY PACK UPPER: Brand: MEDLINE INDUSTRIES, INC.

## (undated) DEVICE — 3 ML SYRINGE LUER-LOCK TIP: Brand: MONOJECT

## (undated) DEVICE — KIT CLEAN ENDOKIT 1.1OZ GOWNX2

## (undated) DEVICE — CAPSULE ENDO PILL CAM SB3

## (undated) DEVICE — SNARE OPTMZ PLPCTM TRP

## (undated) DEVICE — YANKAUER SUCTION INSTRUMENT NO CONTROL VENT, BULB TIP, CLEAR: Brand: YANKAUER

## (undated) DEVICE — MEDI-VAC NON-CONDUCTIVE SUCTION TUBING: Brand: CARDINAL HEALTH

## (undated) DEVICE — Device: Brand: CUSTOM PROCEDURE KIT

## (undated) DEVICE — CONMED SCOPE SAVER BITE BLOCK, 20X27 MM: Brand: SCOPE SAVER

## (undated) NOTE — LETTER
Date: 2022      Patient Name: Phu Rodgers      : 1965        Thank you for choosing [facility] as your health care provider. Your physician has deemed the following medical service(s) necessary. However, your insurance plan may not pay for all of your health care and costs and may deny payment for this service. The fact that your insurance plan does not pay for an item or service does not mean you should not receive it. The purpose of this form is to help you make an informed decision about whether or not you want to receive this service(s) that may not be paid for by your insurance plan. CPT Code Description     Cost     Bilateral carpal tunnel injections under ultrasound guidance   $2400.00    I understand that the above mentioned service(s) or supply may not be covered by my insurance company.  I agree to be financially responsible for the cost of this service or supply in the event of my insurance denies payment as a non-covered benefit.        ______________________________________________________________________  Signature of Patient or Patient's Representative  Relationship  Date    ______________________________________________________________________  Signature of Witness to signing of form   Printed Name

## (undated) NOTE — LETTER
8/10/2017              [facility]         Dear Roshni Carlos,      Sincerely,    Serenity Carter MD  [facility], [address] [address] [phone]

## (undated) NOTE — Clinical Note
Okay to schedule during lunch slot for carpal tunnel injections. Please also schedule follow-up sometime in the beginning of January.

## (undated) NOTE — LETTER
7/7/2017    [address]            Dear Kiley Suarez,      Our records indicate that you are due for an appointment for a Colonoscopy on or about August 2017 with Villa Vazquez MD.    Ty Neff

## (undated) NOTE — MR AVS SNAPSHOT
[address] [address]  [phone]               Thank you for choosing us for your health care visit with Tiarra Posadas MD.  We are glad to serve you and happy to provide you with this summary These medications were sent to [address], [address] AT [address], [phone], [phone] [address] [facility] [place] [address]     Phone:  [phone]    - Pantoprazole Sodium

## (undated) NOTE — LETTER
03/02/20        [address] [address]      Dear Urbano Carpio,    Our records indicate that you have outstanding lab work and or testing that was ordered for you and has not yet been completed:  CBC  Iron & TIBC  Ferritin  To p

## (undated) NOTE — LETTER
AUTHORIZATION FOR SURGICAL OPERATION OR OTHER PROCEDURE    1. I hereby authorize Dr. Alex Behar and the [facility] staff assigned to my case to perform the following operation and/or procedure at the [facility]:     Right triangular fibrocartilage complex corticosteroid injection under ultrasound guidance     2.  My physician has explained the nature and purpose of the operation or other procedure, possible alternative methods of treatment, the risks involved, and the possibility of complication to me.  I acknowledge that no guarantee has been made as to the result that may be obtained.  3.  I recognize that, during the course of this operation, or other procedure, unforseen conditions may necessitate additional or different procedure than those listed above.  I, therefore, further authorize and request that the above named physician, his/her physician assistants or designees perform such procedures as are, in his/her professional opinion, necessary and desirable.  4.  Any tissue or organs removed in the operation or other procedure may be disposed of by and at the discretion of the [facility] staff and [facility].  5.  I understand that in the event of a medical emergency, I will be transported by local paramedics to [facility] or other hospital emergency department.  6.  I certify that I have read and fully understand the above consent to operation and/or other procedure.    7.  I acknowledge that my physician has explained sedation/analgesia administration to me including the risks and benefits.  I consent to the administration of sedation/analgesia as may be necessary or desirable in the judgement of my physician.    Witness signature: ___________________________________________________ Date:  ______/______/_____                    Time:  ________ A.M.  P.M.       Patient Name:  Paula Gordon  1/22/1965  [id number]       Patient signature:   ___________________________________________________                   Statement of Physician  My signature below affirms that prior to the time of the procedure, I have explained to the patient and/or his/her guardian, the risks and benefits involved in the proposed treatment and any reasonable alternative to the proposed treatment.  I have also explained the risks and benefits involved in the refusal of the proposed treatment and have answered the patient's questions.                        Date:  ______/______/_______  Provider                      Signature:  __________________________________________________________       Time:  ___________ AROLAND SANDERSON

## (undated) NOTE — LETTER
8/10/2017              Paula HERNÁNDEZ [facility] [address]         To Whom It May Concern,    Rocio Kelseymicha is under my care for several medical conditions. I recommend that she use a standing desk.     Sincerely,

## (undated) NOTE — LETTER
11/14/2022    [address]            Dear Kimberly Or,      Our records indicate that you are due for an appointment for an EGD or Upper Endoscopy with Gifty Gregg MD. Our doctors are booking out about 3-5 months for procedures. Please call our office to schedule a phone screening appointment to plan for the procedure(s). Your medical well-being is important to us. If your insurance requires a referral, please call your primary care office to request one.       Thank you,      The Physicians and Staff at [facility]

## (undated) NOTE — LETTER
August 7, 2024     Paula Gordon  [address]  [facility] [address]      Dear Paula:    Below are the results from your recent visit:    Resulted Orders   POC Glycohemoglobin [89900]   Result Value Ref Range    HEMOGLOBIN A1C 5.8 (A) 4.3 - 5.6 %    Cartridge Lot# [id number] Numeric    Cartridge Expiration Date 04/04/2026 Date   URINALYSIS NONAUTO W/O SCOPE   Result Value Ref Range    Glucose Urine 2000 and > (A) Negative mg/dL    Bilirubin Urine Negative Negative    Ketones, UA Negative Negative - Trace mg/dL    Spec Gravity 1,000 (A) 1.005 - 1.030    Blood Urine Negative Negative    PH Urine 5.0 5.0 - 8.0    Protein Urine Negative Negative - Trace mg/dL    Urobilinogen Urine 0.2 0.2 - 1.0 mg/dL    Nitrite Urine Negative Negative    Leukocyte Esterase Urine Negative Negative    APPEARANCE clear Clear    Color Urine yellow Yellow    Multistix Lot# [id number] Numeric    Multistix Expiration Date 02/28/2025 Date         FREDRICK Monsalve reviewed your test results. It looks normal. Glucose in urine is because of jardiance as it works by dumping sugar in your urine.     Please let me know if you have any questions,  Have a nice day.     Stephanie Anne MD

## (undated) NOTE — LETTER
09/07/18        [facility] [address]      Dear Marta Nava,    Our records indicate that you have outstanding lab work and or testing that was ordered for you and has not yet been completed:          Augustine Arroyo

## (undated) NOTE — LETTER
09/07/18        [address] [address]      Dear Brandon Bhatt,    Our records indicate that you have outstanding lab work and or testing that was ordered for you and has not yet been completed:    Mammo Screening BILATERAL (W/C

## (undated) NOTE — LETTER
AUTHORIZATION FOR SURGICAL OPERATION OR OTHER PROCEDURE    1. I hereby authorize Dr. Josse Dallas and the [facility] staff assigned to my case to perform the following operation and/or procedure at the [facility]:    Bilateral carpal tunnel injections under ultrasound guidance     2. My physician has explained the nature and purpose of the operation or other procedure, possible alternative methods of treatment, the risks involved, and the possibility of complication to me. I acknowledge that no guarantee has been made as to the result that may be obtained. 3.  I recognize that, during the course of this operation, or other procedure, unforseen conditions may necessitate additional or different procedure than those listed above. I, therefore, further authorize and request that the above named physician, his/her physician assistants or designees perform such procedures as are, in his/her professional opinion, necessary and desirable. 4.  Any tissue or organs removed in the operation or other procedure may be disposed of by and at the discretion of the [facility] staff and [facility]. 5.  I understand that in the event of a medical emergency, I will be transported by local paramedics to [facility] or other hospital emergency department. 6.  I certify that I have read and fully understand the above consent to operation and/or other procedure. 7.  I acknowledge that my physician has explained sedation/analgesia administration to me including the risks and benefits. I consent to the administration of sedation/analgesia as may be necessary or desirable in the judgement of my physician. Witness signature: ___________________________________________________ Date:  ______/______/_____                    Time:  ________ A. M.  P.M.        Patient Name:  Kimberly Or  1/22/1965  [id number]       Patient signature: ___________________________________________________                   Statement of Physician  My signature below affirms that prior to the time of the procedure, I have explained to the patient and/or his/her guardian, the risks and benefits involved in the proposed treatment and any reasonable alternative to the proposed treatment. I have also explained the risks and benefits involved in the refusal of the proposed treatment and have answered the patient's questions.                         Date:  ______/______/_______  Provider                      Signature:  __________________________________________________________       Time:  ___________ AROLAND SANDERSON

## (undated) NOTE — LETTER
[address]  Authorization for Invasive Procedure                                                                                           I hereby authorize Victor Manuel Tillman MD, my physician and his/her assistants (if applicable), which may include medical students, residents, and/or fellows, to perform the following surgical operation/ procedure and administer such anesthesia as may be determined necessary by my physician: Operation/Procedure name (s) ESOPHAGOGASTRODUODENOSCOPY (EGD) on Myriam Calix   2. I recognize that during the surgical operation/procedure, unforeseen conditions may necessitate additional or different procedures than those listed above. I, therefore, further authorize and request that the above-named surgeon, assistants, or designees perform such procedures as are, in their judgment, necessary and desirable. 3.   My surgeon/physician has discussed prior to my surgery the potential benefits, risks and side effects of this procedure; the likelihood of achieving goals; and potential problems that might occur during recuperation. They also discussed reasonable alternatives to the procedure, including risks, benefits, and side effects related to the alternatives and risks related to not receiving this procedure. I have had all my questions answered and I acknowledge that no guarantee has been made as to the result that may be obtained. 4.   Should the need arise during my operation/procedure, which includes change of level of care prior to discharge, I also consent to the administration of blood and/or blood products. Further, I understand that despite careful testing and screening of blood or blood products by collecting agencies, I may still be subject to ill effects as a result of receiving a blood transfusion and/or blood products.   The following are some, but not all, of the potential risks that can occur: fever and allergic reactions, hemolytic reactions, transmission of diseases such as Hepatitis, AIDS and Cytomegalovirus (CMV) and fluid overload. In the event that I wish to have an autologous transfusion of my own blood, or a directed donor transfusion, I will discuss this with my physician. Check only if Refusing Blood or Blood Products  I understand refusal of blood or blood products as deemed necessary by my physician may have serious consequences to my condition to include possible death. I hereby assume responsibility for my refusal and release the hospital, its personnel, and my physicians from any responsibility for the consequences of my refusal.    o  Refuse   5. I authorize the use of any specimen, organs, tissues, body parts or foreign objects that may be removed from my body during the operation/procedure for diagnosis, research or teaching purposes and their subsequent disposal by hospital authorities. I also authorize the release of specimen test results and/or written reports to my treating physician on the hospital medical staff or other referring or consulting physicians involved in my care, at the discretion of the Pathologist or my treating physician. 6.   I consent to the photographing or videotaping of the operations or procedures to be performed, including appropriate portions of my body for medical, scientific, or educational purposes, provided my identity is not revealed by the pictures or by descriptive texts accompanying them. If the procedure has been photographed/videotaped, the surgeon will obtain the original picture, image, videotape or CD. The hospital will not be responsible for storage, release or maintenance of the picture, image, tape or CD.    7.   I consent to the presence of a  or observers in the operating room as deemed necessary by my physician or their designees.     8.   I recognize that in the event my procedure results in extended X-Ray/fluoroscopy time, I may develop a skin reaction. 9. If I have a Do Not Attempt Resuscitation (DNAR) order in place, that status will be suspended while in the operating room, procedural suite, and during the recovery period unless otherwise explicitly stated by me (or a person authorized to consent on my behalf). The surgeon or my attending physician will determine when the applicable recovery period ends for purposes of reinstating the DNAR order. 10. Patients having a sterilization procedure: I understand that if the procedure is successful the results will be permanent and it will therefore be impossible for me to inseminate, conceive, or bear children. I also understand that the procedure is intended to result in sterility, although the result has not been guaranteed. 11. I acknowledge that my physician has explained sedation/analgesia administration to me including the risk and benefits I consent to the administration of sedation/analgesia as may be necessary or desirable in the judgment of my physician. I CERTIFY THAT I HAVE READ AND FULLY UNDERSTAND THE ABOVE CONSENT TO OPERATION and/or OTHER PROCEDURE.     _________________________________________ _________________________________     ___________________________________  Signature of Patient     Signature of Responsible Person                   Printed Name of Responsible Person                              _________________________________________ ______________________________        ___________________________________  Signature of Witness         Date  Time         Relationship to Patient    STATEMENT OF PHYSICIAN My signature below affirms that prior to the time of the procedure; I have explained to the patient and/or his/her legal representative, the risks and benefits involved in the proposed treatment and any reasonable alternative to the proposed treatment.  I have also explained the risks and benefits involved in refusal of the proposed treatment and alternatives to the proposed treatment and have answered the patient's questions.  If I have a significant financial interest in a co-management agreement or a significant financial interest in any product or implant, or other significant relationship used in this procedure/surgery, I have disclosed this and had a discussion with my patient.     _______________________________________________________________ _____________________________  (Signature of Physician)                                                                                         (Date)                                   (Time)  Patient Name: Myriam Calix    : 1965   Printed: 5/15/2023      Medical Record #: [id number]                                              Page 1 of 1

## (undated) NOTE — LETTER
AUTHORIZATION FOR SURGICAL OPERATION OR OTHER PROCEDURE    1. I hereby authorize Dr. Garcia , and [facility] staff assigned to my case to perform the following operation and/or procedure at the [facility] site:    Left foot cortisone injection   _______________________________________________________________________________________________      _______________________________________________________________________________________________    2.  My physician has explained the nature and purpose of the operation or other procedure, possible alternative methods of treatment, the risks involved, and the possibility of complication to me.  I acknowledge that no guarantee has been made as to the result that may be obtained.  3.  I recognize that, during the course of this operation, or other procedure, unforseen conditions may necessitate additional or different procedure than those listed above.  I, therefore, further authorize and request that the above named physician, his/her physician assistants or designees perform such procedures as are, in his/her professional opinion, necessary and desirable.  4.  Any tissue or organs removed in the operation or other procedure may be disposed of by and at the discretion of the [facility] and [facility].  5.  I understand that in the event of a medical emergency, I will be transported by local paramedics to [facility] or other hospital emergency department.  6.  I certify that I have read and fully understand the above consent to operation and/or other procedure.    7.  I acknowledge that my physician has explained sedation/analgesia administration to me including the risks and benefits.  I consent to the administration of sedation/analgesia as may be necessary or desirable in the judgement of my physician.    Witness signature: ___________________________________________________ Date:   ______/______/_____                    Time:  ________ A.M.  P.M.       Patient Name:  ______________________________________________________  (please print)      Patient signature:  ___________________________________________________             Relationship to Patient:           []  Parent    Responsible person                          []  Spouse  In case of minor or                    [] Other  _____________   Incompetent name:  __________________________________________________                               (please print)      _____________      Responsible person  In case of minor or  Incompetent signature:  _______________________________________________    Statement of Physician  My signature below affirms that prior to the time of the procedure, I have explained to the patient and/or his/her guardian, the risks and benefits involved in the proposed treatment and any reasonable alternative to the proposed treatment.  I have also explained the risks and benefits involved in the refusal of the proposed treatment and have answered the patient's questions.                        Date:  ______/______/_______  Provider                      Signature:  __________________________________________________________       Time:  ___________ A.M    P.M.

## (undated) NOTE — LETTER
AUTHORIZATION FOR SURGICAL OPERATION OR OTHER PROCEDURE    1.  I hereby authorize Dr. Fara Rush, and [facility] staff assigned to my case to perform the following operation and/or procedure at the [facility]:    _________________________________ ________ A. M.  P.M.        Patient Name:  ______________________________________________________  (please print)      Patient signature:  ___________________________________________________             Relationship to Patient:           []  Parent    Respo

## (undated) NOTE — LETTER
Date: 2024      Patient Name: Paula Gordon      : 1965        Thank you for choosing [facility] as your health care provider. Your physician has deemed the following medical service(s) necessary. However, your insurance plan may not pay for all of your health care and costs and may deny payment for this service. The fact that your insurance plan does not pay for an item or service does not mean you should not receive it. The purpose of this form is to help you make an informed decision about whether or not you want to receive this service(s) that may not be paid for by your insurance plan.    CPT Code Description     Cost      Right triangular fibrocartilage complex corticosteroid injection under ultrasound guidance     _________ ______________________________ _____________      _________ ______________________________ _____________      I understand that the above mentioned service(s) or supply may not be covered by my insurance company. I agree to be financially responsible for the cost of this service or supply in the event of my insurance denies payment as a non-covered benefit.        ______________________________________________________________________  Signature of Patient or Patient's Representative  Relationship  Date    ______________________________________________________________________  Signature of Witness to signing of form   Printed Name

## (undated) NOTE — LETTER
AUTHORIZATION FOR SURGICAL OPERATION OR OTHER PROCEDURE    1.  I hereby authorize Dr. Julisa Chang and the [facility] staff assigned to my case to perform the following operation and/or procedure at the [facility]:    BILATERAL Carpal tunnel CSI under ultrasound g Patient:           []  Parent    Responsible person                          []  Spouse  In case of minor or                    [] Other  _____________   Incompetent name:  __________________________________________________                               (p